# Patient Record
Sex: FEMALE | Race: WHITE | Employment: OTHER | URBAN - METROPOLITAN AREA
[De-identification: names, ages, dates, MRNs, and addresses within clinical notes are randomized per-mention and may not be internally consistent; named-entity substitution may affect disease eponyms.]

---

## 2018-09-22 ENCOUNTER — HOSPITAL ENCOUNTER (EMERGENCY)
Age: 78
Discharge: HOME | End: 2018-09-22
Attending: EMERGENCY MEDICINE
Payer: MEDICARE

## 2018-09-22 VITALS
DIASTOLIC BLOOD PRESSURE: 83 MMHG | SYSTOLIC BLOOD PRESSURE: 143 MMHG | TEMPERATURE: 97.7 F | HEART RATE: 79 BPM | RESPIRATION RATE: 18 BRPM | OXYGEN SATURATION: 95 %

## 2018-09-22 DIAGNOSIS — S01.81XA FACIAL LACERATION, INITIAL ENCOUNTER: Primary | ICD-10-CM

## 2018-09-22 PROCEDURE — 6360000002 HC RX W HCPCS: Performed by: PHYSICIAN ASSISTANT

## 2018-09-22 PROCEDURE — 90471 IMMUNIZATION ADMIN: CPT | Performed by: PHYSICIAN ASSISTANT

## 2018-09-22 PROCEDURE — 2500000003 HC RX 250 WO HCPCS: Performed by: PHYSICIAN ASSISTANT

## 2018-09-22 PROCEDURE — 99284 EMERGENCY DEPT VISIT MOD MDM: CPT

## 2018-09-22 PROCEDURE — 90715 TDAP VACCINE 7 YRS/> IM: CPT | Performed by: PHYSICIAN ASSISTANT

## 2018-09-22 PROCEDURE — 4500000024 HC ED LEVEL 4 PROCEDURE

## 2018-09-22 RX ORDER — LIDOCAINE HYDROCHLORIDE AND EPINEPHRINE 10; 10 MG/ML; UG/ML
20 INJECTION, SOLUTION INFILTRATION; PERINEURAL ONCE
Status: COMPLETED | OUTPATIENT
Start: 2018-09-22 | End: 2018-09-22

## 2018-09-22 RX ORDER — CHLORHEXIDINE GLUCONATE 0.12 MG/ML
15 RINSE ORAL 2 TIMES DAILY
Qty: 420 ML | Refills: 0 | Status: SHIPPED | OUTPATIENT
Start: 2018-09-22 | End: 2018-10-06

## 2018-09-22 RX ADMIN — LIDOCAINE HYDROCHLORIDE,EPINEPHRINE BITARTRATE 20 ML: 10; .01 INJECTION, SOLUTION INFILTRATION; PERINEURAL at 16:13

## 2018-09-22 RX ADMIN — TETANUS TOXOID, REDUCED DIPHTHERIA TOXOID AND ACELLULAR PERTUSSIS VACCINE, ADSORBED 0.5 ML: 5; 2.5; 8; 8; 2.5 SUSPENSION INTRAMUSCULAR at 16:14

## 2018-09-22 ASSESSMENT — ENCOUNTER SYMPTOMS
PHOTOPHOBIA: 0
RESPIRATORY NEGATIVE: 1
GASTROINTESTINAL NEGATIVE: 1

## 2018-09-22 NOTE — ED PROVIDER NOTES
[address] [facility]          PHYSICIAN ASSISTANT NOTE       Date of evaluation: 9/22/2018    Chief Complaint     Fall (tripped over steps at [facility])    History of Present Illness     Todd Bullock is a 76 y.o. female who [place]. States that she was walking up the steps when she slipped, striking her face against the step. Reports a laceration to her lip. Denies losing consciousness. Denies tooth or jaw pain. Reports some left arm pain from bracing herself. Denies any other injuries. Unknown last tetanus shot. Takes 81mg aspirin. Review of Systems     Review of Systems   Constitutional: Negative for chills, diaphoresis, fatigue and fever. Eyes: Negative for photophobia and visual disturbance. Respiratory: Negative. Cardiovascular: Negative. Gastrointestinal: Negative. Past Medical, Surgical, Family, and Social History     She has a past medical history of Arthritis; Hypertension; and Thyroid disease. She has no past surgical history on file. Her family history is not on file. She reports that she has never smoked. She does not have any smokeless tobacco history on file. She reports that she drinks alcohol. She reports that she does not use drugs. Medications     Previous Medications    ASPIRIN 81 MG CHEWABLE TABLET    Take 81 mg by mouth daily    LEVOTHYROXINE (SYNTHROID) 75 MCG TABLET    Take 75 mcg by mouth Daily    LISINOPRIL (PRINIVIL;ZESTRIL) 40 MG TABLET    Take 40 mg by mouth daily    METOPROLOL TARTRATE (LOPRESSOR) 25 MG TABLET    Take 25 mg by mouth 2 times daily       Allergies     She is allergic to penicillins. Physical Exam     INITIAL VITALS: BP: (!) 165/91, Temp: 97.7 °F (36.5 °C), Pulse: 79, Resp: 18, SpO2: 100 %   Physical Exam   Constitutional: She appears well-developed and well-nourished. No distress.    HENT:   Head: Normocephalic.   2cm through and through laceration to the upper lip MAKING / ASSESSMENT / Santiracheal Inman is a 76 y.o. female who presented with mechanical fall and 2cm laceration to upper lip (philtrum). There was no loss of consciousness. Patient's teeth and jaw were palpated without pain or loose teeth. No imaging studies were obtained. 2cm through and through laceration to the upper lip. Laceration was repaired with sutures, please see procedure note above. Inner mucosa was not repaired. Patient tolerated laceration repair well. Patient was initiated on peridex mouth rinse for inner mucosal laceration. She was instructed to follow-up with her PCP in 5-7 days for suture removal. Patient given return precautions. This patient was also evaluated by the attending physician. All care plans were discussed and agreed upon. Clinical Impression     1.  Facial laceration, initial encounter        Disposition     PATIENT REFERRED TO:  Enrico Healy    Schedule an appointment as soon as possible for a visit   5-7 days      DISCHARGE MEDICATIONS:  New Prescriptions    CHLORHEXIDINE ([facility]) 0.12 % SOLUTION    Take 15 mLs by mouth 2 times daily for 14 days       DISPOSITION Decision To Discharge 09/22/2018 05:17:09 PM       Zhane Mirna, [address]  09/22/18 7027

## 2018-09-22 NOTE — ED PROVIDER NOTES
ED Attending Attestation Note     Date of evaluation: 9/22/2018    This patient was seen by the advance practice provider. I have seen and examined the patient, agree with the workup, evaluation, management and diagnosis. The care plan has been discussed. My assessment reveals Here for a facial laceration. Patient states she actually [place]. She had no loss consciousness no injuries to her teeth area  On exam patient does have a through and through laceration just above her upper lip in the midline measures 2 cm . Please refer to the DEREK's  note in regards to the procedure. I was present during key portions of this procedure.      Pop Gonzales MD  09/22/18 2929

## 2022-04-27 ENCOUNTER — APPOINTMENT (OUTPATIENT)
Dept: GENERAL RADIOLOGY | Age: 82
DRG: 464 | End: 2022-04-27
Payer: MEDICARE

## 2022-04-27 ENCOUNTER — ANESTHESIA (OUTPATIENT)
Dept: OPERATING ROOM | Age: 82
DRG: 464 | End: 2022-04-27
Payer: MEDICARE

## 2022-04-27 ENCOUNTER — APPOINTMENT (OUTPATIENT)
Dept: CT IMAGING | Age: 82
DRG: 464 | End: 2022-04-27
Payer: MEDICARE

## 2022-04-27 ENCOUNTER — ANESTHESIA EVENT (OUTPATIENT)
Dept: OPERATING ROOM | Age: 82
DRG: 464 | End: 2022-04-27
Payer: MEDICARE

## 2022-04-27 ENCOUNTER — HOSPITAL ENCOUNTER (INPATIENT)
Age: 82
LOS: 6 days | Discharge: HOME HEALTH | DRG: 464 | End: 2022-05-03
Attending: EMERGENCY MEDICINE | Admitting: INTERNAL MEDICINE
Payer: MEDICARE

## 2022-04-27 VITALS — TEMPERATURE: 96.8 F | SYSTOLIC BLOOD PRESSURE: 181 MMHG | OXYGEN SATURATION: 100 % | DIASTOLIC BLOOD PRESSURE: 91 MMHG

## 2022-04-27 DIAGNOSIS — S42.292G HUMERUS HEAD FRACTURE, LEFT, WITH DELAYED HEALING, SUBSEQUENT ENCOUNTER: ICD-10-CM

## 2022-04-27 DIAGNOSIS — S43.015A ANTERIOR SHOULDER DISLOCATION, LEFT, INITIAL ENCOUNTER: Primary | ICD-10-CM

## 2022-04-27 DIAGNOSIS — S42.412A LEFT SUPRACONDYLAR HUMERUS FRACTURE, CLOSED, INITIAL ENCOUNTER: ICD-10-CM

## 2022-04-27 DIAGNOSIS — S51.012A ELBOW LACERATION, LEFT, INITIAL ENCOUNTER: ICD-10-CM

## 2022-04-27 DIAGNOSIS — S42.292A HUMERAL HEAD FRACTURE, LEFT, CLOSED, INITIAL ENCOUNTER: ICD-10-CM
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
## 2022-04-27 DIAGNOSIS — D72.829 LEUKOCYTOSIS, UNSPECIFIED TYPE: ICD-10-CM

## 2022-04-27 LAB
A/G RATIO: 1.7 (ref 1.1–2.2)
ALBUMIN SERPL-MCNC: 4.4 G/DL (ref 3.4–5)
ALP BLD-CCNC: 32 U/L (ref 40–129)
ALT SERPL-CCNC: 36 U/L (ref 10–40)
ANION GAP SERPL CALCULATED.3IONS-SCNC: 12 MMOL/L (ref 3–16)
AST SERPL-CCNC: 43 U/L (ref 15–37)
BACTERIA: ABNORMAL /HPF
BASOPHILS ABSOLUTE: 0.1 K/UL (ref 0–0.2)
BASOPHILS RELATIVE PERCENT: 0.4 %
BILIRUB SERPL-MCNC: 0.5 MG/DL (ref 0–1)
BILIRUBIN URINE: NEGATIVE
BLOOD, URINE: ABNORMAL
BUN BLDV-MCNC: 18 MG/DL (ref 7–20)
CALCIUM SERPL-MCNC: 9.3 MG/DL (ref 8.3–10.6)
CHLORIDE BLD-SCNC: 100 MMOL/L (ref 99–110)
CLARITY: ABNORMAL
CO2: 26 MMOL/L (ref 21–32)
COLOR: YELLOW
CREAT SERPL-MCNC: 0.9 MG/DL (ref 0.6–1.2)
EOSINOPHILS ABSOLUTE: 0.1 K/UL (ref 0–0.6)
EOSINOPHILS RELATIVE PERCENT: 0.4 %
EPITHELIAL CELLS, UA: ABNORMAL /HPF (ref 0–5)
GFR AFRICAN AMERICAN: >60
GFR NON-AFRICAN AMERICAN: 60
GLUCOSE BLD-MCNC: 114 MG/DL (ref 70–99)
GLUCOSE URINE: NEGATIVE MG/DL
HCT VFR BLD CALC: 34.6 % (ref 36–48)
HEMOGLOBIN: 11.7 G/DL (ref 12–16)
KETONES, URINE: ABNORMAL MG/DL
LEUKOCYTE ESTERASE, URINE: ABNORMAL
LYMPHOCYTES ABSOLUTE: 0.7 K/UL (ref 1–5.1)
LYMPHOCYTES RELATIVE PERCENT: 3.9 %
MCH RBC QN AUTO: 30.8 PG (ref 26–34)
MCHC RBC AUTO-ENTMCNC: 33.9 G/DL (ref 31–36)
MCV RBC AUTO: 90.9 FL (ref 80–100)
MICROSCOPIC EXAMINATION: YES
MONOCYTES ABSOLUTE: 1.1 K/UL (ref 0–1.3)
MONOCYTES RELATIVE PERCENT: 6 %
NEUTROPHILS ABSOLUTE: 17 K/UL (ref 1.7–7.7)
NEUTROPHILS RELATIVE PERCENT: 89.3 %
NITRITE, URINE: NEGATIVE
PDW BLD-RTO: 13 % (ref 12.4–15.4)
PH UA: 7.5 (ref 5–8)
PLATELET # BLD: 178 K/UL (ref 135–450)
PMV BLD AUTO: 9.4 FL (ref 5–10.5)
POTASSIUM SERPL-SCNC: 4.1 MMOL/L (ref 3.5–5.1)
PROTEIN UA: NEGATIVE MG/DL
RBC # BLD: 3.8 M/UL (ref 4–5.2)
RBC UA: ABNORMAL /HPF (ref 0–4)
SODIUM BLD-SCNC: 138 MMOL/L (ref 136–145)
SPECIFIC GRAVITY UA: 1.02 (ref 1–1.03)
TOTAL PROTEIN: 7 G/DL (ref 6.4–8.2)
URINE REFLEX TO CULTURE: YES
URINE TYPE: ABNORMAL
UROBILINOGEN, URINE: 0.2 E.U./DL
WBC # BLD: 19 K/UL (ref 4–11)
WBC UA: ABNORMAL /HPF (ref 0–5)

## 2022-04-27 PROCEDURE — 23655 CLTX SHO DSLC W/MNPJ W/ANES: CPT | Performed by: ORTHOPAEDIC SURGERY

## 2022-04-27 PROCEDURE — 3700000001 HC ADD 15 MINUTES (ANESTHESIA): Performed by: ORTHOPAEDIC SURGERY

## 2022-04-27 PROCEDURE — 73060 X-RAY EXAM OF HUMERUS: CPT

## 2022-04-27 PROCEDURE — 3700000000 HC ANESTHESIA ATTENDED CARE: Performed by: ORTHOPAEDIC SURGERY

## 2022-04-27 PROCEDURE — 85025 COMPLETE CBC W/AUTO DIFF WBC: CPT

## 2022-04-27 PROCEDURE — 6360000002 HC RX W HCPCS: Performed by: ORTHOPAEDIC SURGERY

## 2022-04-27 PROCEDURE — 93005 ELECTROCARDIOGRAM TRACING: CPT | Performed by: EMERGENCY MEDICINE

## 2022-04-27 PROCEDURE — 81001 URINALYSIS AUTO W/SCOPE: CPT

## 2022-04-27 PROCEDURE — 6360000002 HC RX W HCPCS: Performed by: ANESTHESIOLOGY

## 2022-04-27 PROCEDURE — 3600000013 HC SURGERY LEVEL 3 ADDTL 15MIN: Performed by: ORTHOPAEDIC SURGERY

## 2022-04-27 PROCEDURE — 2500000003 HC RX 250 WO HCPCS: Performed by: ANESTHESIOLOGY

## 2022-04-27 PROCEDURE — 73070 X-RAY EXAM OF ELBOW: CPT

## 2022-04-27 PROCEDURE — 71045 X-RAY EXAM CHEST 1 VIEW: CPT

## 2022-04-27 PROCEDURE — 99222 1ST HOSP IP/OBS MODERATE 55: CPT | Performed by: ORTHOPAEDIC SURGERY

## 2022-04-27 PROCEDURE — 73110 X-RAY EXAM OF WRIST: CPT

## 2022-04-27 PROCEDURE — 7100000000 HC PACU RECOVERY - FIRST 15 MIN: Performed by: ORTHOPAEDIC SURGERY

## 2022-04-27 PROCEDURE — 0JBF0ZZ EXCISION OF LEFT UPPER ARM SUBCUTANEOUS TISSUE AND FASCIA, OPEN APPROACH: ICD-10-PCS | Performed by: ORTHOPAEDIC SURGERY

## 2022-04-27 PROCEDURE — 96365 THER/PROPH/DIAG IV INF INIT: CPT

## 2022-04-27 PROCEDURE — 80053 COMPREHEN METABOLIC PANEL: CPT

## 2022-04-27 PROCEDURE — 73020 X-RAY EXAM OF SHOULDER: CPT

## 2022-04-27 PROCEDURE — 99285 EMERGENCY DEPT VISIT HI MDM: CPT

## 2022-04-27 PROCEDURE — 73200 CT UPPER EXTREMITY W/O DYE: CPT

## 2022-04-27 PROCEDURE — 36415 COLL VENOUS BLD VENIPUNCTURE: CPT

## 2022-04-27 PROCEDURE — 2580000003 HC RX 258: Performed by: ANESTHESIOLOGY

## 2022-04-27 PROCEDURE — 2709999900 HC NON-CHARGEABLE SUPPLY: Performed by: ORTHOPAEDIC SURGERY

## 2022-04-27 PROCEDURE — 7100000001 HC PACU RECOVERY - ADDTL 15 MIN: Performed by: ORTHOPAEDIC SURGERY

## 2022-04-27 PROCEDURE — 6360000002 HC RX W HCPCS: Performed by: EMERGENCY MEDICINE

## 2022-04-27 PROCEDURE — 73030 X-RAY EXAM OF SHOULDER: CPT

## 2022-04-27 PROCEDURE — 96375 TX/PRO/DX INJ NEW DRUG ADDON: CPT

## 2022-04-27 PROCEDURE — 2580000003 HC RX 258: Performed by: EMERGENCY MEDICINE

## 2022-04-27 PROCEDURE — 3600000003 HC SURGERY LEVEL 3 BASE: Performed by: ORTHOPAEDIC SURGERY

## 2022-04-27 PROCEDURE — 73502 X-RAY EXAM HIP UNI 2-3 VIEWS: CPT

## 2022-04-27 PROCEDURE — 1200000000 HC SEMI PRIVATE

## 2022-04-27 PROCEDURE — 3209999900 FLUORO FOR SURGICAL PROCEDURES

## 2022-04-27 PROCEDURE — 96376 TX/PRO/DX INJ SAME DRUG ADON: CPT

## 2022-04-27 PROCEDURE — 20103 EXPL PENTRG WOUND EXTREMITY: CPT | Performed by: ORTHOPAEDIC SURGERY

## 2022-04-27 RX ORDER — ONDANSETRON 2 MG/ML
4 INJECTION INTRAMUSCULAR; INTRAVENOUS EVERY 6 HOURS PRN
Status: DISCONTINUED | OUTPATIENT
Start: 2022-04-27 | End: 2022-05-03 | Stop reason: HOSPADM

## 2022-04-27 RX ORDER — SUCCINYLCHOLINE/SOD CL,ISO/PF 100 MG/5ML
SYRINGE (ML) INTRAVENOUS PRN
Status: DISCONTINUED | OUTPATIENT
Start: 2022-04-27 | End: 2022-04-27 | Stop reason: SDUPTHER

## 2022-04-27 RX ORDER — LABETALOL HYDROCHLORIDE 5 MG/ML
10 INJECTION, SOLUTION INTRAVENOUS
Status: DISCONTINUED | OUTPATIENT
Start: 2022-04-27 | End: 2022-04-28 | Stop reason: HOSPADM

## 2022-04-27 RX ORDER — DEXAMETHASONE SODIUM PHOSPHATE 4 MG/ML
INJECTION, SOLUTION INTRA-ARTICULAR; INTRALESIONAL; INTRAMUSCULAR; INTRAVENOUS; SOFT TISSUE PRN
Status: DISCONTINUED | OUTPATIENT
Start: 2022-04-27 | End: 2022-04-27 | Stop reason: SDUPTHER

## 2022-04-27 RX ORDER — SODIUM CHLORIDE 9 MG/ML
INJECTION, SOLUTION INTRAVENOUS PRN
Status: DISCONTINUED | OUTPATIENT
Start: 2022-04-27 | End: 2022-04-28 | Stop reason: HOSPADM

## 2022-04-27 RX ORDER — ACETAMINOPHEN 650 MG/1
650 SUPPOSITORY RECTAL EVERY 6 HOURS PRN
Status: DISCONTINUED | OUTPATIENT
Start: 2022-04-27 | End: 2022-05-03 | Stop reason: HOSPADM

## 2022-04-27 RX ORDER — FENTANYL CITRATE 50 UG/ML
25 INJECTION, SOLUTION INTRAMUSCULAR; INTRAVENOUS EVERY 5 MIN PRN
Status: DISCONTINUED | OUTPATIENT
Start: 2022-04-27 | End: 2022-04-28 | Stop reason: HOSPADM

## 2022-04-27 RX ORDER — SODIUM CHLORIDE, SODIUM LACTATE, POTASSIUM CHLORIDE, CALCIUM CHLORIDE 600; 310; 30; 20 MG/100ML; MG/100ML; MG/100ML; MG/100ML
INJECTION, SOLUTION INTRAVENOUS CONTINUOUS PRN
Status: DISCONTINUED | OUTPATIENT
Start: 2022-04-27 | End: 2022-04-27 | Stop reason: SDUPTHER

## 2022-04-27 RX ORDER — LIDOCAINE HYDROCHLORIDE AND EPINEPHRINE 10; 10 MG/ML; UG/ML
20 INJECTION, SOLUTION INFILTRATION; PERINEURAL ONCE
Status: DISCONTINUED | OUTPATIENT
Start: 2022-04-27 | End: 2022-05-03 | Stop reason: HOSPADM

## 2022-04-27 RX ORDER — MORPHINE SULFATE 2 MG/ML
2 INJECTION, SOLUTION INTRAMUSCULAR; INTRAVENOUS
Status: DISCONTINUED | OUTPATIENT
Start: 2022-04-27 | End: 2022-05-03 | Stop reason: HOSPADM

## 2022-04-27 RX ORDER — OXYCODONE HYDROCHLORIDE 5 MG/1
5 TABLET ORAL
Status: ACTIVE | OUTPATIENT
Start: 2022-04-27 | End: 2022-04-27

## 2022-04-27 RX ORDER — ROSUVASTATIN CALCIUM 40 MG/1
TABLET, COATED ORAL
COMMUNITY
Start: 2022-04-11

## 2022-04-27 RX ORDER — ONDANSETRON 2 MG/ML
INJECTION INTRAMUSCULAR; INTRAVENOUS PRN
Status: DISCONTINUED | OUTPATIENT
Start: 2022-04-27 | End: 2022-04-27 | Stop reason: SDUPTHER

## 2022-04-27 RX ORDER — LISINOPRIL 40 MG/1
40 TABLET ORAL DAILY
Status: DISCONTINUED | OUTPATIENT
Start: 2022-04-28 | End: 2022-05-03 | Stop reason: HOSPADM

## 2022-04-27 RX ORDER — CLINDAMYCIN PHOSPHATE 900 MG/50ML
INJECTION INTRAVENOUS PRN
Status: DISCONTINUED | OUTPATIENT
Start: 2022-04-27 | End: 2022-04-27 | Stop reason: SDUPTHER

## 2022-04-27 RX ORDER — ONDANSETRON 2 MG/ML
4 INJECTION INTRAMUSCULAR; INTRAVENOUS
Status: ACTIVE | OUTPATIENT
Start: 2022-04-27 | End: 2022-04-27

## 2022-04-27 RX ORDER — LIDOCAINE HYDROCHLORIDE 20 MG/ML
INJECTION, SOLUTION INTRAVENOUS PRN
Status: DISCONTINUED | OUTPATIENT
Start: 2022-04-27 | End: 2022-04-27 | Stop reason: SDUPTHER

## 2022-04-27 RX ORDER — SODIUM CHLORIDE 0.9 % (FLUSH) 0.9 %
5-40 SYRINGE (ML) INJECTION PRN
Status: DISCONTINUED | OUTPATIENT
Start: 2022-04-27 | End: 2022-04-28 | Stop reason: HOSPADM

## 2022-04-27 RX ORDER — FENTANYL CITRATE 50 UG/ML
50 INJECTION, SOLUTION INTRAMUSCULAR; INTRAVENOUS ONCE
Status: COMPLETED | OUTPATIENT
Start: 2022-04-27 | End: 2022-04-27

## 2022-04-27 RX ORDER — LIDOCAINE HYDROCHLORIDE 20 MG/ML
INJECTION, SOLUTION EPIDURAL; INFILTRATION; INTRACAUDAL; PERINEURAL PRN
Status: DISCONTINUED | OUTPATIENT
Start: 2022-04-27 | End: 2022-04-27

## 2022-04-27 RX ORDER — FENTANYL CITRATE 50 UG/ML
INJECTION, SOLUTION INTRAMUSCULAR; INTRAVENOUS PRN
Status: DISCONTINUED | OUTPATIENT
Start: 2022-04-27 | End: 2022-04-27 | Stop reason: SDUPTHER

## 2022-04-27 RX ORDER — METOPROLOL SUCCINATE 100 MG/1
TABLET, EXTENDED RELEASE ORAL
COMMUNITY
Start: 2022-03-15

## 2022-04-27 RX ORDER — ONDANSETRON 4 MG/1
4 TABLET, ORALLY DISINTEGRATING ORAL EVERY 8 HOURS PRN
Status: DISCONTINUED | OUTPATIENT
Start: 2022-04-27 | End: 2022-05-03 | Stop reason: HOSPADM

## 2022-04-27 RX ORDER — MORPHINE SULFATE 2 MG/ML
2 INJECTION, SOLUTION INTRAMUSCULAR; INTRAVENOUS ONCE
Status: COMPLETED | OUTPATIENT
Start: 2022-04-27 | End: 2022-04-27

## 2022-04-27 RX ORDER — SODIUM CHLORIDE 0.9 % (FLUSH) 0.9 %
5-40 SYRINGE (ML) INJECTION EVERY 12 HOURS SCHEDULED
Status: DISCONTINUED | OUTPATIENT
Start: 2022-04-27 | End: 2022-05-03 | Stop reason: HOSPADM

## 2022-04-27 RX ORDER — POLYETHYLENE GLYCOL 3350 17 G/17G
17 POWDER, FOR SOLUTION ORAL DAILY PRN
Status: DISCONTINUED | OUTPATIENT
Start: 2022-04-27 | End: 2022-05-03 | Stop reason: HOSPADM

## 2022-04-27 RX ORDER — ACETAMINOPHEN 325 MG/1
650 TABLET ORAL EVERY 6 HOURS PRN
Status: DISCONTINUED | OUTPATIENT
Start: 2022-04-27 | End: 2022-05-03 | Stop reason: HOSPADM

## 2022-04-27 RX ORDER — PROPOFOL 10 MG/ML
INJECTION, EMULSION INTRAVENOUS PRN
Status: DISCONTINUED | OUTPATIENT
Start: 2022-04-27 | End: 2022-04-27 | Stop reason: SDUPTHER

## 2022-04-27 RX ORDER — SODIUM CHLORIDE 0.9 % (FLUSH) 0.9 %
5-40 SYRINGE (ML) INJECTION PRN
Status: DISCONTINUED | OUTPATIENT
Start: 2022-04-27 | End: 2022-05-03 | Stop reason: HOSPADM

## 2022-04-27 RX ORDER — LEVOTHYROXINE SODIUM 88 UG/1
88 TABLET ORAL DAILY
Status: DISCONTINUED | OUTPATIENT
Start: 2022-04-28 | End: 2022-05-03 | Stop reason: HOSPADM

## 2022-04-27 RX ORDER — SODIUM CHLORIDE 9 MG/ML
INJECTION, SOLUTION INTRAVENOUS PRN
Status: DISCONTINUED | OUTPATIENT
Start: 2022-04-27 | End: 2022-05-03 | Stop reason: HOSPADM

## 2022-04-27 RX ORDER — METOPROLOL SUCCINATE 50 MG/1
50 TABLET, EXTENDED RELEASE ORAL DAILY
Status: DISCONTINUED | OUTPATIENT
Start: 2022-04-28 | End: 2022-04-30

## 2022-04-27 RX ORDER — PROPOFOL 10 MG/ML
20 INJECTION, EMULSION INTRAVENOUS ONCE
Status: COMPLETED | OUTPATIENT
Start: 2022-04-27 | End: 2022-04-27

## 2022-04-27 RX ORDER — SODIUM CHLORIDE 9 MG/ML
1000 INJECTION, SOLUTION INTRAVENOUS CONTINUOUS
Status: DISCONTINUED | OUTPATIENT
Start: 2022-04-27 | End: 2022-04-30

## 2022-04-27 RX ORDER — AMLODIPINE BESYLATE 5 MG/1
TABLET ORAL
COMMUNITY
Start: 2022-02-23

## 2022-04-27 RX ORDER — SODIUM CHLORIDE 0.9 % (FLUSH) 0.9 %
5-40 SYRINGE (ML) INJECTION EVERY 12 HOURS SCHEDULED
Status: DISCONTINUED | OUTPATIENT
Start: 2022-04-27 | End: 2022-04-28 | Stop reason: HOSPADM

## 2022-04-27 RX ORDER — TRAMADOL HYDROCHLORIDE 50 MG/1
50 TABLET ORAL EVERY 6 HOURS PRN
Status: DISCONTINUED | OUTPATIENT
Start: 2022-04-27 | End: 2022-05-01

## 2022-04-27 RX ADMIN — PROPOFOL 100 MG: 10 INJECTION, EMULSION INTRAVENOUS at 22:38

## 2022-04-27 RX ADMIN — Medication 140 MG: at 22:38

## 2022-04-27 RX ADMIN — MORPHINE SULFATE 2 MG: 2 INJECTION, SOLUTION INTRAMUSCULAR; INTRAVENOUS at 20:40

## 2022-04-27 RX ADMIN — ONDANSETRON 4 MG: 2 INJECTION INTRAMUSCULAR; INTRAVENOUS at 23:23

## 2022-04-27 RX ADMIN — DEXAMETHASONE SODIUM PHOSPHATE 8 MG: 4 INJECTION, SOLUTION INTRAMUSCULAR; INTRAVENOUS at 23:00

## 2022-04-27 RX ADMIN — SODIUM CHLORIDE, SODIUM LACTATE, POTASSIUM CHLORIDE, AND CALCIUM CHLORIDE: .6; .31; .03; .02 INJECTION, SOLUTION INTRAVENOUS at 22:30

## 2022-04-27 RX ADMIN — SODIUM CHLORIDE 1000 ML: 9 INJECTION, SOLUTION INTRAVENOUS at 18:39

## 2022-04-27 RX ADMIN — CEFAZOLIN SODIUM 1000 MG: 1 INJECTION, POWDER, FOR SOLUTION INTRAMUSCULAR; INTRAVENOUS at 20:37

## 2022-04-27 RX ADMIN — CLINDAMYCIN PHOSPHATE 900 MG: 18 INJECTION, SOLUTION INTRAVENOUS at 22:44

## 2022-04-27 RX ADMIN — SODIUM CHLORIDE 1000 ML: 9 INJECTION, SOLUTION INTRAVENOUS at 18:53

## 2022-04-27 RX ADMIN — FENTANYL CITRATE 100 MCG: 50 INJECTION, SOLUTION INTRAMUSCULAR; INTRAVENOUS at 22:38

## 2022-04-27 RX ADMIN — MORPHINE SULFATE 2 MG: 2 INJECTION, SOLUTION INTRAMUSCULAR; INTRAVENOUS at 17:13

## 2022-04-27 RX ADMIN — FENTANYL CITRATE 50 MCG: 50 INJECTION, SOLUTION INTRAMUSCULAR; INTRAVENOUS at 18:03

## 2022-04-27 RX ADMIN — PROPOFOL 20 MG: 10 INJECTION, EMULSION INTRAVENOUS at 18:35

## 2022-04-27 RX ADMIN — LIDOCAINE HYDROCHLORIDE 100 MG: 20 INJECTION, SOLUTION INTRAVENOUS at 22:38

## 2022-04-27 ASSESSMENT — PULMONARY FUNCTION TESTS
PIF_VALUE: 15
PIF_VALUE: 15
PIF_VALUE: 17
PIF_VALUE: 1
PIF_VALUE: 0
PIF_VALUE: 26
PIF_VALUE: 16
PIF_VALUE: 9
PIF_VALUE: 24
PIF_VALUE: 1
PIF_VALUE: 17
PIF_VALUE: 0
PIF_VALUE: 16
PIF_VALUE: 16
PIF_VALUE: 15
PIF_VALUE: 15
PIF_VALUE: 1
PIF_VALUE: 16
PIF_VALUE: 16
PIF_VALUE: 0
PIF_VALUE: 15
PIF_VALUE: 15
PIF_VALUE: 1
PIF_VALUE: 13
PIF_VALUE: 1
PIF_VALUE: 13
PIF_VALUE: 2
PIF_VALUE: 13
PIF_VALUE: 13
PIF_VALUE: 18
PIF_VALUE: 13
PIF_VALUE: 14
PIF_VALUE: 13
PIF_VALUE: 14
PIF_VALUE: 0
PIF_VALUE: 17
PIF_VALUE: 15
PIF_VALUE: 15
PIF_VALUE: 4
PIF_VALUE: 0
PIF_VALUE: 1
PIF_VALUE: 16
PIF_VALUE: 15
PIF_VALUE: 13
PIF_VALUE: 2
PIF_VALUE: 13
PIF_VALUE: 17
PIF_VALUE: 20
PIF_VALUE: 16
PIF_VALUE: 0
PIF_VALUE: 13
PIF_VALUE: 15
PIF_VALUE: 0
PIF_VALUE: 16
PIF_VALUE: 4
PIF_VALUE: 13
PIF_VALUE: 15

## 2022-04-27 ASSESSMENT — PAIN DESCRIPTION - DESCRIPTORS: DESCRIPTORS: ACHING

## 2022-04-27 ASSESSMENT — PAIN SCALES - GENERAL
PAINLEVEL_OUTOF10: 4
PAINLEVEL_OUTOF10: 0

## 2022-04-27 ASSESSMENT — PAIN - FUNCTIONAL ASSESSMENT: PAIN_FUNCTIONAL_ASSESSMENT: 0-10

## 2022-04-27 ASSESSMENT — PAIN DESCRIPTION - PAIN TYPE: TYPE: ACUTE PAIN

## 2022-04-27 ASSESSMENT — PAIN DESCRIPTION - ORIENTATION: ORIENTATION: LEFT

## 2022-04-27 ASSESSMENT — PAIN DESCRIPTION - FREQUENCY: FREQUENCY: CONTINUOUS

## 2022-04-27 ASSESSMENT — PAIN DESCRIPTION - LOCATION: LOCATION: ARM;ELBOW

## 2022-04-27 NOTE — ED NOTES
Dr Heriberto Mcqueen talking with family by telephone, family is Out-of-town, Dr Heriberto Mcqueen obtaining verbal consent for conscious sedation, giving patient update.      Renee Acevedo RN  04/27/22 2151

## 2022-04-27 NOTE — ED TRIAGE NOTES
Patient to ed per [facility] with complaints of a left arm/elbow injury after a trip and fall, patient also has left elbow laceration patient denies other injuries.

## 2022-04-27 NOTE — ED PROVIDER NOTES
[facility]  EMERGENCY DEPT VISIT      Patient Identification  Edinson Mi is a 78 y.o. female. Chief Complaint   Arm Injury (left) and Elbow Injury (left)      History of Present Illness: This is a  78 y.o. female who presents via ambulance to the ED with complaints of fall. Patient was at the [employer] and turned to walk away and lost her balance and fell. She landed onto her left shoulder. She did not hit her head nor did she lose consciousness. She was not dizzy prior to the fall or afterwards. She denies any neck or back pain. No rib pain or shortness of breath. She complains of left shoulder pain and left elbow soreness. No numbness or weakness to the hand. Movement of the elbow causes the shoulder to hurt. She was able to get up with assistance and able to walk a few feet before sitting down and denies any hip, knee, or ankle pain. She lives alone and does not use a walker. Otherwise states that she has been feeling well. There is been no recent fever. No sinus congestion, sore throat, cough, vomiting, diarrhea, dysuria. Past Medical History:   Diagnosis Date    Arthritis     Hypertension     Thyroid disease        No past surgical history on file.       Current Facility-Administered Medications:     lidocaine-EPINEPHrine 1 %-1:350089 injection 20 mL, 20 mL, IntraDERmal, Once, Tito Fung MD    0.9 % sodium chloride infusion, 1,000 mL, IntraVENous, Continuous, Tito Fung MD, Last Rate: 100 mL/hr at 04/27/22 1853, 1,000 mL at 04/27/22 1853    ceFAZolin (ANCEF) 1,000 mg in dextrose 5 % 50 mL IVPB (mini-bag), 1,000 mg, IntraVENous, Once, Tito Fung MD, Last Rate: 100 mL/hr at 04/27/22 2037, 1,000 mg at 04/27/22 2037    Current Outpatient Medications:     metoprolol succinate (TOPROL XL) 100 MG extended release tablet, , Disp: , Rfl:     amLODIPine (NORVASC) 5 MG tablet, , Disp: , Rfl:     rosuvastatin (CRESTOR) 40 MG tablet, , Disp: , Rfl:     aspirin 81 MG chewable tablet, Take 81 mg by mouth daily (Patient not taking: Reported on 4/27/2022), Disp: , Rfl:     lisinopril (PRINIVIL;ZESTRIL) 40 MG tablet, Take 40 mg by mouth daily, Disp: , Rfl:     levothyroxine (SYNTHROID) 75 MCG tablet, Take 88 mcg by mouth Daily , Disp: , Rfl:     Allergies   Allergen Reactions    Latex Other (See Comments)    Penicillins        Social History     Socioeconomic History    Marital status: Single     Spouse name: Not on file    Number of children: Not on file    Years of education: Not on file    Highest education level: Not on file   Occupational History    Not on file   Tobacco Use    Smoking status: Never Smoker    Smokeless tobacco: Not on file   Substance and Sexual Activity    Alcohol use: Yes     Comment: social    Drug use: No    Sexual activity: Not on file   Other Topics Concern    Not on file   Social History Narrative    Not on file     Social Determinants of Health     Financial Resource Strain:     Difficulty of Paying Living Expenses: Not on file   Food Insecurity:     Worried About Running Out of Food in the Last Year: Not on file    [place] of Food in the Last Year: Not on file   Transportation Needs:     Lack of Transportation (Medical): Not on file    Lack of Transportation (Non-Medical):  Not on file   Physical Activity:     Days of Exercise per Week: Not on file    Minutes of Exercise per Session: Not on file   Stress:     Feeling of Stress : Not on file   Social Connections:     Frequency of Communication with Friends and Family: Not on file    Frequency of Social Gatherings with Friends and Family: Not on file    Attends Mandaeism Services: Not on file    Active Member of Clubs or Organizations: Not on file    Attends Club or Organization Meetings: Not on file    Marital Status: Not on file   Intimate Partner Violence:     Fear of Current or Ex-Partner: Not on file    Emotionally Abused: Not on file    Physically Abused: Not on file  Sexually Abused: Not on file   Housing Stability:     Unable to Pay for Housing in the Last Year: Not on file    Number of Places Lived in the Last Year: Not on file    Unstable Housing in the Last Year: Not on file       Nursing Notes Reviewed      ROS:  GENERAL:  No fever, no chills, no diaphoresis, no appetite changes  EYES: no eye discharge, no eye redness, no visual changes  ENT: no nasal congestion, no sore throat  CARDIAC: no chest pain,  no leg swelling  PULM: no cough, no shortness of breath  ABD: no abdominal pain, no nausea, no vomiting, no diarrhea,  : no dysuria, no hematuria, no urgency, no frequency. No flank pain  MUSCULOSKELETAL: no back pain, + arthralgias, no myalgias  NEURO: no headache, no lightheadedness, no dizziness, no numbness, no weakness, no syncope, no confusion, no speech difficulty  SKIN: no rashes, no erythema, + wounds, + ecchymosis      PHYSICAL EXAM:  GENERAL APPEARANCE: Lee Locke is in no acute respiratory distress. Awake and alert. VITAL SIGNS:   ED Triage Vitals [04/27/22 1524]   Enc Vitals Group      BP (!) 164/70      Pulse 73      Resp 14      Temp 97.8 °F (36.6 °C)      Temp src       SpO2 98 %      Weight 108 lb (49 kg)      Height 5' 3\" (1.6 m)      Head Circumference       Peak Flow       Pain Score       Pain Loc       Pain Edu? Excl. in [address]? HEAD: Normocephalic, atraumatic. No scalp hematoma  EYES:  Extraocular muscles are intact. Pupils equal round and reactive to light. Conjunctivas are pink. Negative scleral icterus. ENT:  Mucous membranes are moist.  Pharynx without erythema or exudates. NECK: Nontender and supple. No cervical adenopathy. No cervical spine tenderness  CHEST:  Clear to auscultation bilaterally. No rales, rhonchi, or wheezing. Chest wall is nontender  HEART:  Regular rate and regular rhythm. No murmurs. Strong and equal pulses in the upper and lower extremities.    ABDOMEN: Soft,  nondistended, positive bowel sounds. abdomen is nontender. No rebound. no guarding. MUSCULOSKELETAL: The calves are nontender to palpation. Mild tenderness to left lateral hip but good ROM without pain. Bilateral knees and ankles nontender. Left shoulder with marked swelling and bruising and anterior fullness. Pain with any ROM. Left elbow with marked swelling and ecchymosis. Gaping stellate deep wound to posterior elbow measuring approximately 6 cm into muscle. Left elbow is tender with pain with full extension although uncertain if this is related to increased shoulder pain with movement. Left wrist no swelling. Nontender. Good ROM. Strong radial pulse. No leg edema. NEUROLOGICAL: Awake, alert and oriented x 3. Power intact in the upper and lower extremities. Sensation is intact to light touch in the upper and lower extremities. Cranial Nerves 2-12 are intact. No truncal ataxia. No dysarthria or aphasia. DERMATOLOGIC: No petechiae, rashes No erythema. PSYCH: normal mood and affect. Normal thought content. ED COURSE AND MEDICAL DECISION MAKING:    EKG as interpreted by myself:  normal sinus rhythm with a rate of 76  Axis is   Normal  QTc is  normal   Anterior Q waves, low voltage  Intervals and Durations are unremarkable. No specific ST-T wave changes appreciated. No evidence of acute ischemia. Radiology:  Films have been read by radiologist as noted in chart unless otherwise stated. Other radiologic studies (i.e. CT, MRI, ultrasounds, etc ) have been interpreted by radiologist.     XR HIP 2-3 VW W PELVIS LEFT   Final Result      1. No acute fracture identified in left hip.   2.  Old left pubic bone fracture. XR CHEST 1 VIEW   Final Result      1. No acute cardiopulmonary findings. 2.  Multiple chronic right rib fractures. 3.  Multiple left rib fractures difficult to age but also may be chronic. 4.  Increased displacement across the left proximal humerus fracture.  Persistent anterior dislocation of the left shoulder. 5.  Age-indeterminate T9 and T12 vertebral compression fractures. Correlate with point tenderness. XR SHOULDER LEFT (MIN 2 VIEWS)   Final Result      1. Anterior dislocation of the left shoulder. 2. Comminuted oblique/transverse fracture of the left humeral head/neck. XR WRIST LEFT (MIN 3 VIEWS)   Final Result      No acute bony pathology identified. Significant degenerative changes as above      XR ELBOW LEFT (2 VIEWS)   Final Result   See above      XR HUMERUS LEFT (MIN 2 VIEWS)   Final Result   See above      CT SHOULDER LEFT WO CONTRAST    (Results Pending)     XR HUMERUS LEFT (MIN 2 VIEWS)    Result Date: 4/27/2022  LEFT HUMERUS ON 4/27/2022 HISTORY: Pain after fall with deformity. COMPARISON: None. FINDINGS: 3 views of the left humerus show comminuted fracture of the proximal humerus with posterior dislocation. See above    XR ELBOW LEFT (2 VIEWS)    Result Date: 4/27/2022  LEFT ELBOW ON 4/27/2022 HISTORY: Fall, pain, laceration. COMPARISON: None. FINDINGS: This exam is limited due to technique. Intra-articular air is not excluded based on findings of 1 image. However this may be artifactual. No definite fracture is seen. Alignment is difficult to evaluate. See above    XR WRIST LEFT (MIN 3 VIEWS)    Result Date: 4/27/2022  LEFT WRIST ON 4/27/2022 HISTORY: Pain after fall. COMPARISON: None. FINDINGS: 3 views of the left wrist show no definite fracture or acute bony pathology. Alignment is anatomic. There is joint space narrowing at the radiocarpal joints. Severe degenerative changes are seen at the base of the thumb. There is widening of the scapholunate joint space. No acute bony pathology identified. Significant degenerative changes as above    XR SHOULDER LEFT (MIN 2 VIEWS)    Result Date: 4/27/2022  EXAM: XR SHOULDER LEFT (MIN 2 VIEWS) INDICATION: Left shoulder pain COMPARISON: None FINDINGS: 3 views. There is anterior dislocation of the left shoulder.  There is a comminuted oblique/transverse fracture extends through the left humeral head/neck. No significant soft tissue abnormality is identified. 1. Anterior dislocation of the left shoulder. 2. Comminuted oblique/transverse fracture of the left humeral head/neck.     Labs:  Results for orders placed or performed during the hospital encounter of 04/27/22   Comprehensive Metabolic Panel   Result Value Ref Range    Sodium 138 136 - 145 mmol/L    Potassium 4.1 3.5 - 5.1 mmol/L    Chloride 100 99 - 110 mmol/L    CO2 26 21 - 32 mmol/L    Anion Gap 12 3 - 16    Glucose 114 (H) 70 - 99 mg/dL    BUN 18 7 - 20 mg/dL    CREATININE 0.9 0.6 - 1.2 mg/dL    GFR Non-African American 60 (A) >60    GFR African American >60 >60    Calcium 9.3 8.3 - 10.6 mg/dL    Total Protein 7.0 6.4 - 8.2 g/dL    Albumin 4.4 3.4 - 5.0 g/dL    Albumin/Globulin Ratio 1.7 1.1 - 2.2    Total Bilirubin 0.5 0.0 - 1.0 mg/dL    Alkaline Phosphatase 32 (L) 40 - 129 U/L    ALT 36 10 - 40 U/L    AST 43 (H) 15 - 37 U/L   CBC with Auto Differential   Result Value Ref Range    WBC 19.0 (H) 4.0 - 11.0 K/uL    RBC 3.80 (L) 4.00 - 5.20 M/uL    Hemoglobin 11.7 (L) 12.0 - 16.0 g/dL    Hematocrit 34.6 (L) 36.0 - 48.0 %    MCV 90.9 80.0 - 100.0 fL    MCH 30.8 26.0 - 34.0 pg    MCHC 33.9 31.0 - 36.0 g/dL    RDW 13.0 12.4 - 15.4 %    Platelets 037 934 - 410 K/uL    MPV 9.4 5.0 - 10.5 fL    Neutrophils % 89.3 %    Lymphocytes % 3.9 %    Monocytes % 6.0 %    Eosinophils % 0.4 %    Basophils % 0.4 %    Neutrophils Absolute 17.0 (H) 1.7 - 7.7 K/uL    Lymphocytes Absolute 0.7 (L) 1.0 - 5.1 K/uL    Monocytes Absolute 1.1 0.0 - 1.3 K/uL    Eosinophils Absolute 0.1 0.0 - 0.6 K/uL    Basophils Absolute 0.1 0.0 - 0.2 K/uL   Urinalysis with Reflex to Culture    Specimen: Urine   Result Value Ref Range    Color, UA Yellow Straw/Yellow    Clarity, UA SL CLOUDY (A) Clear    Glucose, Ur Negative Negative mg/dL    Bilirubin Urine Negative Negative    Ketones, Urine TRACE (A) Negative mg/dL Specific Gravity, UA 1.020 1.005 - 1.030    Blood, Urine TRACE (A) Negative    pH, UA 7.5 5.0 - 8.0    Protein, UA Negative Negative mg/dL    Urobilinogen, Urine 0.2 <2.0 E.U./dL    Nitrite, Urine Negative Negative    Leukocyte Esterase, Urine MODERATE (A) Negative    Microscopic Examination YES     Urine Type NotGiven     Urine Reflex to Culture Yes    Microscopic Urinalysis   Result Value Ref Range    WBC, UA 21-50 (A) 0 - 5 /HPF    RBC, UA 3-4 0 - 4 /HPF    Epithelial Cells, UA 2-5 0 - 5 /HPF    Bacteria, UA 1+ (A) None Seen /HPF   EKG 12 Lead   Result Value Ref Range    Ventricular Rate 76 BPM    Atrial Rate 76 BPM    P-R Interval 170 ms    QRS Duration 68 ms    Q-T Interval 372 ms    QTc Calculation (Bazett) 418 ms    P Axis 64 degrees    R Axis -11 degrees    T Axis 30 degrees    Diagnosis       Normal sinus rhythmAnterior infarct , age undeterminedAbnormal ECG       Treatment in the department:  Patient received the following while in the ED. Medications   lidocaine-EPINEPHrine 1 %-1:825579 injection 20 mL (has no administration in time range)   0.9 % sodium chloride infusion (1,000 mLs IntraVENous New Bag 4/27/22 1853)   ceFAZolin (ANCEF) 1,000 mg in dextrose 5 % 50 mL IVPB (mini-bag) (1,000 mg IntraVENous New Bag 4/27/22 2037)   morphine (PF) injection 2 mg (2 mg IntraVENous Given 4/27/22 1713)   fentaNYL (SUBLIMAZE) injection 50 mcg (50 mcg IntraVENous Given 4/27/22 1803)   propofol injection 20 mg (20 mg IntraVENous New Bag 4/27/22 1835)   morphine (PF) injection 2 mg (2 mg IntraVENous Given 4/27/22 2040)     PROCEDURE:  JOINT REDUCTION  The benefits, risks, and alternatives of left shoulder disloation reduction were discussed with Robert De Souza and their surrogate Janee hooper daughter. . An opportunity to ask questions was provided, and consent was given for the procedure.  Once adequately anesthetized with fentanyl, the left shoulder was attempted to be reduced using traction-countertraction. Initial attempts at shoulder reduction were unsuccessful using only fentanyl althoug patient tolerated attempts well and remained neurovascularly intact. Decision made to perform conscious sedation. pateint in agreement. Discussed case with step daughter, power of attourriley, Abelardo Garg, ([phone]) who also consents. PROCEDURE:  PROCEDURAL SEDATION    Pre-sedation Assessment    Intended Procedure: left shoulder dislocation reduction    Pre-Procedure Assessment/Plan:  Airway:mallampati 2  ASA 2 - Patient with mild systemic disease with no functional limitations    Level of Sedation Plan: Moderate sedation    Post Procedure plan: Return to same level of care    I assessed the patient and find that the patient is in satisfactory condition to proceed with the planned procedure and sedation plan. The benefits, risks, and alternatives of procedural sedation were discussed with Abelardo Christopher or their surrogate. We discussed the potential side effects or adverse reactions that could occur with propofol. An opportunity to ask questions was provided, and consent was given for the procedure. Oxygen was administered, and the appropriate pre-procedural policies were followed. Hospital protocol for cardiac, oxygen saturation, and blood pressure monitoring occurred. For details of the dosage administered, see the procedural sedation documentation. Abelardo Christopher tolerated the medication and procedure without complications. Abelardo Christopher woke up without difficulty, and was sent home with someone to evaluate them during the post-sedation period. Total amount of inservice time: 20 minutes      PROCEDURE:  JOINT REDUCTION  The benefits, risks, and alternatives of left shoulder reduction were discussed with Abelardo Christopher or their surrogate. An opportunity to ask questions was provided, and consent was given for the procedure.  Once adequately anesthetized with propofol, additional attempts were made to reucethe left shoulder but were again unsuccessful. She tolerated this with some pain. Honey Roberts or their surrogate had an opportunity to ask questions, and the risks, benefits, and alternatives were discussed. The wound located left elbow was prepped and draped to maintain a sterile field. The wound was anesthetized with 1% lido with epi. It was copiously irrigated. It was explored to its depth in a bloodless field with no sign of tendon, nerve, or vascular injury. No foreign bodies were identified. It was closed loosely with 9. 40 ethilon sutures There were no complications during the procedure. Medical decision making:  Discussed case with Dr Nadine Li, orthopedics. Requested the patient be transferred to [facility] for admission under the hospitalist.  He reviewed x-rays and requested a CT of the shoulder. Plan is to go to the [address] [place] [address]. He also requested loose closure of the left elbow laceration. I spoke with Dr. Genoveva Winter. We thoroughly discussed the history, physical exam, laboratory and imaging studies, as well as, emergency department course. Based upon that discussion, we've decided to admit Honey Roberts for further observation and evaluation of Dulce Ritschel-Wallner's shoulder fracture dislocation from mechanical fall. As I have deemed necessary from their history, physical, and studies, I have considered and evaluated Honey Roberts for the following diagnoses: fracture, dislocation, laceration with joint involvement, uti, pneumonia      Clinical Impression:  1. Anterior shoulder dislocation, left, initial encounter    2. Humeral head fracture, left, closed, initial encounter    3. Elbow laceration, left, initial encounter    4. Fall, initial encounter    5. Leukocytosis, unspecified type        Dispo:  Patient will be  admitted at this time.  Patient was informed of this decision and agrees with plan. I have discussed lab and xray findings with patient and they understand. Questions were answered to the best of my ability. Discharge vitals:  Blood pressure (!) 155/64, pulse 67, temperature 97.8 °F (36.6 °C), resp. rate 13, height 5' 3\" (1.6 m), weight 108 lb (49 kg), SpO2 99 %. Prescriptions given:   New Prescriptions    No medications on file       This chart was created using Dragon voice recognition software.         Sky Cespedes MD  04/27/22 3117

## 2022-04-28 ENCOUNTER — TELEPHONE (OUTPATIENT)
Dept: ORTHOPEDIC SURGERY | Age: 82
End: 2022-04-28

## 2022-04-28 LAB
ABO/RH: NORMAL
ANION GAP SERPL CALCULATED.3IONS-SCNC: 9 MMOL/L (ref 3–16)
ANTIBODY SCREEN: NORMAL
BASOPHILS ABSOLUTE: 0 K/UL (ref 0–0.2)
BASOPHILS RELATIVE PERCENT: 0.2 %
BUN BLDV-MCNC: 15 MG/DL (ref 7–20)
CALCIUM SERPL-MCNC: 8.6 MG/DL (ref 8.3–10.6)
CHLORIDE BLD-SCNC: 96 MMOL/L (ref 99–110)
CO2: 27 MMOL/L (ref 21–32)
CREAT SERPL-MCNC: 0.7 MG/DL (ref 0.6–1.2)
EKG ATRIAL RATE: 76 BPM
EKG DIAGNOSIS: NORMAL
EKG P AXIS: 64 DEGREES
EKG P-R INTERVAL: 170 MS
EKG Q-T INTERVAL: 372 MS
EKG QRS DURATION: 68 MS
EKG QTC CALCULATION (BAZETT): 418 MS
EKG R AXIS: -11 DEGREES
EKG T AXIS: 30 DEGREES
EKG VENTRICULAR RATE: 76 BPM
EOSINOPHILS ABSOLUTE: 0 K/UL (ref 0–0.6)
EOSINOPHILS RELATIVE PERCENT: 0 %
GFR AFRICAN AMERICAN: >60
GFR NON-AFRICAN AMERICAN: >60
GLUCOSE BLD-MCNC: 165 MG/DL (ref 70–99)
HCT VFR BLD CALC: 32.5 % (ref 36–48)
HEMOGLOBIN: 11.1 G/DL (ref 12–16)
LYMPHOCYTES ABSOLUTE: 0.5 K/UL (ref 1–5.1)
LYMPHOCYTES RELATIVE PERCENT: 3.8 %
MCH RBC QN AUTO: 31 PG (ref 26–34)
MCHC RBC AUTO-ENTMCNC: 34 G/DL (ref 31–36)
MCV RBC AUTO: 91.4 FL (ref 80–100)
MONOCYTES ABSOLUTE: 0.3 K/UL (ref 0–1.3)
MONOCYTES RELATIVE PERCENT: 2.4 %
NEUTROPHILS ABSOLUTE: 11.2 K/UL (ref 1.7–7.7)
NEUTROPHILS RELATIVE PERCENT: 93.6 %
PDW BLD-RTO: 13 % (ref 12.4–15.4)
PLATELET # BLD: 148 K/UL (ref 135–450)
PMV BLD AUTO: 9.5 FL (ref 5–10.5)
POTASSIUM REFLEX MAGNESIUM: 4.1 MMOL/L (ref 3.5–5.1)
RBC # BLD: 3.56 M/UL (ref 4–5.2)
SODIUM BLD-SCNC: 132 MMOL/L (ref 136–145)
VITAMIN B-12: 838 PG/ML (ref 211–911)
WBC # BLD: 12 K/UL (ref 4–11)

## 2022-04-28 PROCEDURE — 85025 COMPLETE CBC W/AUTO DIFF WBC: CPT

## 2022-04-28 PROCEDURE — 97530 THERAPEUTIC ACTIVITIES: CPT

## 2022-04-28 PROCEDURE — 93010 ELECTROCARDIOGRAM REPORT: CPT | Performed by: INTERNAL MEDICINE

## 2022-04-28 PROCEDURE — 94150 VITAL CAPACITY TEST: CPT

## 2022-04-28 PROCEDURE — 2580000003 HC RX 258: Performed by: ORTHOPAEDIC SURGERY

## 2022-04-28 PROCEDURE — 82607 VITAMIN B-12: CPT

## 2022-04-28 PROCEDURE — 86850 RBC ANTIBODY SCREEN: CPT

## 2022-04-28 PROCEDURE — 86900 BLOOD TYPING SEROLOGIC ABO: CPT

## 2022-04-28 PROCEDURE — 6370000000 HC RX 637 (ALT 250 FOR IP): Performed by: INTERNAL MEDICINE

## 2022-04-28 PROCEDURE — 97166 OT EVAL MOD COMPLEX 45 MIN: CPT

## 2022-04-28 PROCEDURE — 6360000002 HC RX W HCPCS: Performed by: ORTHOPAEDIC SURGERY

## 2022-04-28 PROCEDURE — 1200000000 HC SEMI PRIVATE

## 2022-04-28 PROCEDURE — 97535 SELF CARE MNGMENT TRAINING: CPT

## 2022-04-28 PROCEDURE — 80048 BASIC METABOLIC PNL TOTAL CA: CPT

## 2022-04-28 PROCEDURE — 97116 GAIT TRAINING THERAPY: CPT

## 2022-04-28 PROCEDURE — 97162 PT EVAL MOD COMPLEX 30 MIN: CPT

## 2022-04-28 PROCEDURE — 6370000000 HC RX 637 (ALT 250 FOR IP): Performed by: ORTHOPAEDIC SURGERY

## 2022-04-28 PROCEDURE — 86901 BLOOD TYPING SEROLOGIC RH(D): CPT

## 2022-04-28 PROCEDURE — 36415 COLL VENOUS BLD VENIPUNCTURE: CPT

## 2022-04-28 RX ORDER — SODIUM CHLORIDE 9 MG/ML
INJECTION, SOLUTION INTRAVENOUS PRN
Status: DISCONTINUED | OUTPATIENT
Start: 2022-04-28 | End: 2022-05-03 | Stop reason: HOSPADM

## 2022-04-28 RX ORDER — SODIUM CHLORIDE 0.9 % (FLUSH) 0.9 %
5-40 SYRINGE (ML) INJECTION PRN
Status: DISCONTINUED | OUTPATIENT
Start: 2022-04-28 | End: 2022-05-03 | Stop reason: HOSPADM

## 2022-04-28 RX ORDER — SODIUM CHLORIDE 0.9 % (FLUSH) 0.9 %
5-40 SYRINGE (ML) INJECTION EVERY 12 HOURS SCHEDULED
Status: DISCONTINUED | OUTPATIENT
Start: 2022-04-28 | End: 2022-05-03 | Stop reason: HOSPADM

## 2022-04-28 RX ORDER — SODIUM CHLORIDE 450 MG/100ML
INJECTION, SOLUTION INTRAVENOUS CONTINUOUS
Status: DISCONTINUED | OUTPATIENT
Start: 2022-04-28 | End: 2022-04-30

## 2022-04-28 RX ADMIN — SODIUM CHLORIDE: 4.5 INJECTION, SOLUTION INTRAVENOUS at 00:23

## 2022-04-28 RX ADMIN — METOPROLOL SUCCINATE 50 MG: 50 TABLET, EXTENDED RELEASE ORAL at 23:06

## 2022-04-28 RX ADMIN — LEVOTHYROXINE SODIUM 88 MCG: 0.09 TABLET ORAL at 06:36

## 2022-04-28 RX ADMIN — SODIUM CHLORIDE, PRESERVATIVE FREE 10 ML: 5 INJECTION INTRAVENOUS at 09:37

## 2022-04-28 RX ADMIN — SODIUM CHLORIDE, PRESERVATIVE FREE 10 ML: 5 INJECTION INTRAVENOUS at 19:47

## 2022-04-28 RX ADMIN — CEFTRIAXONE 1000 MG: 1 INJECTION, POWDER, FOR SOLUTION INTRAMUSCULAR; INTRAVENOUS at 19:54

## 2022-04-28 RX ADMIN — LISINOPRIL 40 MG: 40 TABLET ORAL at 23:07

## 2022-04-28 RX ADMIN — SODIUM CHLORIDE, PRESERVATIVE FREE 10 ML: 5 INJECTION INTRAVENOUS at 19:48

## 2022-04-28 RX ADMIN — SODIUM CHLORIDE, PRESERVATIVE FREE 10 ML: 5 INJECTION INTRAVENOUS at 00:20

## 2022-04-28 ASSESSMENT — PAIN SCALES - GENERAL
PAINLEVEL_OUTOF10: 0

## 2022-04-28 ASSESSMENT — ENCOUNTER SYMPTOMS
RESPIRATORY NEGATIVE: 1
GASTROINTESTINAL NEGATIVE: 1
EYES NEGATIVE: 1

## 2022-04-28 NOTE — PROGRESS NOTES
4 Eyes Admission Assessment     I agree as the admission nurse that 2 RN's have performed a thorough Head to Toe Skin Assessment on the patient. ALL assessment sites listed below have been assessed on admission. Areas assessed by both nurses:   [x]   Head, Face, and Ears   [x]   Shoulders, Back, and Chest  [x]   Arms, Elbows, and Hands   [x]   Coccyx, Sacrum, and Ischium  [x]   Legs, Feet, and Heels        Does the Patient have Skin Breakdown?   No         Crispin Prevention initiated:  NA   Wound Care Orders initiated:  NA      WOC nurse consulted for Pressure Injury (Stage 3,4, Unstageable, DTI, NWPT, and Complex wounds) or Crispin score 18 or lower:  NA      Nurse 1 eSignature: Electronically signed by Kallie Woodruff RN on 4/28/22 at 12:35 AM EDT    **SHARE this note so that the co-signing nurse is able to place an eSignature**    Nurse 2 eSignature: {Esignature:[id number]}

## 2022-04-28 NOTE — PROGRESS NOTES
Physical Therapy  Facility/Department: [facility] ORTHO/NEURO  Physical Therapy Initial Assessment and Treatment    Name: Nixon Aceves  : 1940  MRN: [id number]  Date of Service: 2022    Discharge Recommendations:  Nixon Aceves scored a 20/24 on the AM-PAC short mobility form. Current research shows that an AM-PAC score of 18 or greater is typically associated with a discharge to the patient's home setting. Based on the patient's AM-PAC score and their current functional mobility deficits, it is recommended that the patient have 2-3 sessions per week of Physical Therapy at d/c to increase the patient's independence. At this time, this patient demonstrates the endurance and safety to discharge home. Please see assessment section for further patient specific details. PT Equipment Recommendations  Equipment Needed: No      Assessment   Assessment: Pt from home with L humeral fx s/p mechanical fall. Pt doing well from PT standpoint. Pt initally reaching out for balance with ambulation, but improved with use of cane. Recommend pt use cane at home and pt in agreement. Pt with L immobilizing sling in place. Pt lives alone and plans assist from friends/neighbors or to hire help. Will continue to follow for PT to maximize mobility. Treatment Diagnosis: Decreased gait associated with L humeral fx. Decision Making: Medium Complexity  Requires PT Follow-Up: Yes        Plan   Plan:  (2-5)  Current Treatment Recommendations: Transfer training,Functional mobility training,Gait training,Strengthening    Safety Devices  Type of Devices: Left in chair,Chair alarm in place,Call light within reach,Nurse notified     Restrictions  Position Activity Restriction  Other position/activity restrictions: NWB L UE; L sling in place     Subjective   Chart Reviewed: Yes  Additional Pertinent Hx: Pt to [facility] ED  with mechanical fall while at the [facility].   Imaging (+) for L shoulder dislocation, Comminuted oblique/transverse fracture of the left humeral head/neck and elbow laceration. Transferred to [facility] for ortho. Pt underwent L shoulder closed reduction and elbow I&D. PMH:  OA, HTN  Diagnosis: Fall    Subjective  Subjective: Pt found supine in bed. Pt pleasant and agreeable for PT. \"My right arm is strong. I can figure it out. \"  Pt denies pain. Social/Functional History  Lives With: Alone  Type of Home: Apartment  Home Layout: One level  Home Access: Elevator  Bathroom Shower/Tub: Walk-in shower  Bathroom Toilet: Standard  Bathroom Equipment: Grab bars in shower  Home Equipment: [address] (recliner)  Has the patient had two or more falls in the past year or any fall with injury in the past year?: Yes  ADL Assistance: [facility]: Independent (cleaning assist 1x a month- wash linens)  Ambulation Assistance: Independent  Transfer Assistance: Independent  Active : Yes  Occupation: Retired (real estate)  Additional Comments: Pt has no family in town. Pt reports friends/neighbors are her support system. Vision/Hearing  Hearing: Within functional limits    Vision:  Wears reading glasses    Cognition   Orientation  Overall Orientation Status: Within Functional Limits     Objective   Gross Assessment  AROM: Within functional limits  Strength: Within functional limits     Bed mobility  Supine to Sit: Supervision (HOB raised)  Comment: Pt has a recliner she can sleep in. Transfers  Sit to Stand: Stand by assistance  Stand to sit: Stand by assistance     Ambulation  Device: Single point cane  Other Apparatus:  (L sling/immobilizer in place)  Assistance: Contact guard assistance (progressing to SBA)  Gait Deviations: Slow Samantha;Decreased step height;Decreased step length  Distance: 200ft  Comments: Pt also ambulated 15ft without assistive device and Min A. Pt with guarded gait and reaching out for balance.       Balance  Sitting - Static: Good  Sitting - Dynamic: Good  Standing - Static: Fair  Standing - Dynamic: Fair (CGA/SBA with use of cane for ambulation)      AM-PAC Score  AM-PAC Inpatient Mobility Raw Score : 20 (04/28/22 0942)  AM-PAC Inpatient T-Scale Score : 47.67 (04/28/22 0942)  Mobility Inpatient CMS 0-100% Score: 35.83 (04/28/22 8161)  Mobility Inpatient CMS G-Code Modifier : CJ (04/28/22 1283)    Goals  Short Term Goals  Time Frame for Short term goals: Discharge  Short term goal 1: sit <> stand supervison  Short term goal 2: ambulate >250ft with cane supervision  Patient Goals   Patient goals : Return home       Therapy Time   Individual Concurrent Group Co-treatment   Time In 0815         Time Out 0909         Minutes 54         Timed Code Treatment Minutes:  40  Total Treatment Minutes:  SELIN Reyes

## 2022-04-28 NOTE — PLAN OF CARE
Pt a/o x4. Admitted from [address] [place] [address] ED for planned surgery of dislocated left shoulder and elbow laceration. Pt tolerated surgery well and only complains of discomfort with movement. Pt up to the restroom with 2 assist. VSS. Call light within reach. Fall precautions in place. Will continue to monitor     Problem: Pain  Goal: Verbalizes/displays adequate comfort level or baseline comfort level  Outcome: Progressing  Pt has had no serious complaints of pain. States there is a little bit of discomfort with movement. Will continue to access. Problem: Safety - Adult  Goal: Free from fall injury  Outcome: Progressing    Bed alarm in use. Call light within reach. Side rails x3. Bed in lowest position.  Will continue to monitor

## 2022-04-28 NOTE — BRIEF OP NOTE
Brief Postoperative Note      Patient: Ambar Frank  YOB: 1940  MRN: [id number]    Date of Procedure: 4/27/2022    Pre-Op Diagnosis: LEFT SHOULDER FRACTURE DISLOCATION, LEFT ELBOW LACERATION    Post-Op Diagnosis: Same       Procedure(s):  LEFT SHOULDER CLOSED REDUCTION, LEFT ELBOW  INCISION AND DRAINAGE    Surgeon(s):  Jelena Rock MD    Assistant:  Surgical Assistant: Betzy Edwards    Anesthesia: General    Estimated Blood Loss (mL): Minimal    Complications: None    Specimens:   * No specimens in log *    Implants:  * No implants in log *      Drains: * No LDAs found *    Findings: Same    Electronically signed by Jelena Rock MD on 4/27/2022 at 11:29 PM

## 2022-04-28 NOTE — FLOWSHEET NOTE
04/28/22 1403   Encounter Summary   Encounter Overview/Reason  Initial Encounter   Service Provided For: Patient   Referral/Consult From: Nathalie   Last Encounter  04/28/22  (4/28, rishi )   Complexity of Encounter Moderate   Begin Time 1403   End Time  1407   Total Time Calculated 4 min   Encounter    Type Initial Screen/Assessment   Assessment/Intervention/Outcome   Assessment Calm   Intervention Active listening;Explored/Affirmed feelings, thoughts, concerns   Outcome Expressed Gratitude; Refused/Declined   Staff Delia Springer, [place]

## 2022-04-28 NOTE — PROGRESS NOTES
Department of Orthopedic Surgery  Fellow Progress Note      SUBJECTIVE  Left shoulder pain since yesterday. patient had a mechanical fall yesterday, which resulted in left shoulder 4 parts fracture dislocation, a trial of closed reduction was not successful, Dr Kaitlin Valerio was consulted. OBJECTIVE:  O/E:  Intact neurovascular exam of left UE,  Intact sensation at deltoid region compared to right shoulder.   Tenderness at proximal humerus, no tenderness at left elbow, hand      Physical    VITALS:  /67   Pulse 95   Temp 97.8 °F (36.6 °C) (Oral)   Resp 16   Ht 5' 3\" (1.6 m)   Wt 108 lb (49 kg)   SpO2 99%   BMI 19.13 kg/m²   24HR INTAKE/OUTPUT:    Intake/Output Summary (Last 24 hours) at 4/28/2022 1918  Last data filed at 4/28/2022 0350  Gross per 24 hour   Intake 600 ml   Output 650 ml   Net -50 ml     URINARY CATHETER OUTPUT (Mathews):     DRAIN/TUBE OUTPUT:     VENT SETTINGS:     Additional Respiratory Assessments  Pulse: 95  Resp: 16  SpO2: 99 %    CONSTITUTIONAL:  awake, alert, cooperative and no apparent distress    Data    CBC:   Lab Results   Component Value Date    WBC 12.0 04/28/2022    RBC 3.56 04/28/2022    HGB 11.1 04/28/2022    HCT 32.5 04/28/2022    MCV 91.4 04/28/2022    MCH 31.0 04/28/2022    MCHC 34.0 04/28/2022    RDW 13.0 04/28/2022     04/28/2022    MPV 9.5 04/28/2022     Current Inpatient Medications    Current Facility-Administered Medications: 0.45 % sodium chloride infusion, , IntraVENous, Continuous  sodium chloride flush 0.9 % injection 5-40 mL, 5-40 mL, IntraVENous, 2 times per day  sodium chloride flush 0.9 % injection 5-40 mL, 5-40 mL, IntraVENous, PRN  0.9 % sodium chloride infusion, , IntraVENous, PRN  lidocaine-EPINEPHrine 1 %-[id number] injection 20 mL, 20 mL, IntraDERmal, Once  0.9 % sodium chloride infusion, 1,000 mL, IntraVENous, Continuous  levothyroxine (SYNTHROID) tablet 88 mcg, 88 mcg, Oral, Daily  [Held by provider] lisinopril (PRINIVIL;ZESTRIL) tablet 40 mg, 40 mg, Oral, Daily  [Held by provider] metoprolol succinate (TOPROL XL) extended release tablet 50 mg, 50 mg, Oral, Daily  sodium chloride flush 0.9 % injection 5-40 mL, 5-40 mL, IntraVENous, 2 times per day  sodium chloride flush 0.9 % injection 5-40 mL, 5-40 mL, IntraVENous, PRN  0.9 % sodium chloride infusion, , IntraVENous, PRN  ondansetron (ZOFRAN-ODT) disintegrating tablet 4 mg, 4 mg, Oral, Q8H PRN **OR** ondansetron (ZOFRAN) injection 4 mg, 4 mg, IntraVENous, Q6H PRN  polyethylene glycol (GLYCOLAX) packet 17 g, 17 g, Oral, Daily PRN  acetaminophen (TYLENOL) tablet 650 mg, 650 mg, Oral, Q6H PRN **OR** acetaminophen (TYLENOL) suppository 650 mg, 650 mg, Rectal, Q6H PRN  morphine (PF) injection 2 mg, 2 mg, IntraVENous, Q3H PRN  traMADol (ULTRAM) tablet 50 mg, 50 mg, Oral, Q6H PRN  cefTRIAXone (ROCEPHIN) 1000 mg IVPB in 50 mL D5W minibag, 1,000 mg, IntraVENous, Q24H    ASSESSMENT AND PLAN      4 part shoulder fracture dislocation, the most predictable outcome is total shoulder replacement ( reverse) , I had a thorough discussion with the patient and her step daughter Gómez Garcia, I discussed risk of vascular injury, infection and dislocation. expected rehabilitation.   Plan: NPO after midnight, medical consult for surgical clearance

## 2022-04-28 NOTE — ED NOTES
Pt to [facility] in stable condition per [facility].      Yissel Perez RN  04/27/22 2093

## 2022-04-28 NOTE — PROGRESS NOTES
Progress Note    Admit Date: 4/27/2022  Day: 1    Diet: ADULT DIET; Regular    CC:Fall    Interval history:   Patient seen and examined  She was siting comfortably having breakfast   Admitted last night for Left shoulder fractures with left shoulder dislocation. As per the patient she went the [facility] near her location and she lost balance. Denied any bowel or bladder incontinence. No  or dizziness reported     Review of Systems   Constitutional: Negative. HENT: Negative. Eyes: Negative. Respiratory: Negative. Cardiovascular: Negative. Gastrointestinal: Negative. Endocrine: Negative. Musculoskeletal: Positive for joint swelling. Skin: Negative. Neurological: Negative. Hematological: Negative. Psychiatric/Behavioral: Negative.       Medications:     Scheduled Meds:   sodium chloride flush  5-40 mL IntraVENous 2 times per day    lidocaine-EPINEPHrine  20 mL IntraDERmal Once    levothyroxine  88 mcg Oral Daily    [Held by provider] lisinopril  40 mg Oral Daily    [Held by provider] metoprolol succinate  50 mg Oral Daily    sodium chloride flush  5-40 mL IntraVENous 2 times per day    cefTRIAXone (ROCEPHIN) IV  1,000 mg IntraVENous Q24H     Continuous Infusions:   sodium chloride 75 mL/hr at 04/28/22 0023    sodium chloride      sodium chloride Stopped (04/27/22 2131)    sodium chloride       PRN Meds:sodium chloride flush, sodium chloride, sodium chloride flush, sodium chloride, ondansetron **OR** ondansetron, polyethylene glycol, acetaminophen **OR** acetaminophen, morphine, traMADol    Objective:   Vitals:   T-max:  Patient Vitals for the past 8 hrs:   BP Temp Temp src Pulse Resp SpO2   04/28/22 0634 135/64 98 °F (36.7 °C) Oral 71 19 98 %   04/28/22 0350 (!) 146/69 97.8 °F (36.6 °C) Oral 69 18 96 %       Intake/Output Summary (Last 24 hours) at 4/28/2022 1135  Last data filed at 4/28/2022 0350  Gross per 24 hour   Intake 600 ml   Output 650 ml   Net -50 ml Physical Exam  Vitals and nursing note reviewed. HENT:      Head: Normocephalic. Cardiovascular:      Rate and Rhythm: Normal rate and regular rhythm. Pulses: Normal pulses. Heart sounds: Normal heart sounds. Pulmonary:      Effort: Pulmonary effort is normal.      Breath sounds: Normal breath sounds. Abdominal:      General: Bowel sounds are normal.   Neurological:      General: No focal deficit present. Mental Status: She is oriented to person, place, and time. Psychiatric:         Mood and Affect: Mood normal.         Behavior: Behavior normal.           LABS:    CBC:   Recent Labs     04/27/22 1835 04/28/22  0632   WBC 19.0* 12.0*   HGB 11.7* 11.1*   HCT 34.6* 32.5*    148   MCV 90.9 91.4     Renal:    Recent Labs     04/27/22 1835 04/28/22  0632    132*   K 4.1 4.1    96*   CO2 26 27   BUN 18 15   CREATININE 0.9 0.7   GLUCOSE 114* 165*   CALCIUM 9.3 8.6   ANIONGAP 12 9     Hepatic:   Recent Labs     04/27/22 1835   AST 43*   ALT 36   BILITOT 0.5   PROT 7.0   LABALBU 4.4   ALKPHOS 32*     Troponin: No results for input(s): TROPONINI in the last 72 hours. BNP: No results for input(s): BNP in the last 72 hours. Lipids: No results for input(s): CHOL, HDL in the last 72 hours. Invalid input(s): LDLCALCU, TRIGLYCERIDE  ABGs:  No results for input(s): PHART, WPG1JWI, PO2ART, FWM4RQP, BEART, THGBART, J1WTISQR, AZB5ZMO in the last 72 hours. INR: No results for input(s): INR in the last 72 hours. Lactate: No results for input(s): LACTATE in the last 72 hours. Cultures:  -----------------------------------------------------------------  RAD:   CT SHOULDER LEFT WO CONTRAST       Indication: Left shoulder fracture dislocation.       Comparison: Radiograph same day       Technique:  Contiguous axial CT of the left shoulder was acquired. The axial data set was reformatted in the coronal and sagittal planes.  Up-to-date CT equipment and radiation dose reduction techniques were employed.       Findings:        Severely comminuted fracture in the proximal humerus with involvement of the surgical neck, anatomic neck, and the greater and lesser tuberosities. There is significant impaction/shortening across the surgical neck. There is significant displacement of    the greater tuberosity fragment. This comprises a three-part fracture. The humeral head is anteriorly dislocated. The humeral head is engaged on the anterior glenoid within a deep Hill-Sachs impaction fracture.       There is a displaced fracture in the anterior rim of the glenoid. There is a displaced fracture from the anterior tip of the coracoid process.       There is a large glenohumeral joint lipohemarthrosis. There is hemorrhagic fluid within the subacromial-subdeltoid bursa. There is hematoma throughout the right axilla. EXAM: PELVIS AND LEFT HIP 3 VIEWS       INDICATION: Fall. Left hip pain.       COMPARISON: None       FINDINGS:       The patient's hand is overlying the abdomen which obscures the sacrum and left iliac wing.       No acute fracture or malalignment identified in the left hip. There is an old fracture in the left pubic bone. PORTABLE CHEST       Indication: Fall. Chest pain.       Comparison: None.       Findings:       Patient is rotated. Heart size is within normal limits. No consolidation, pleural effusion or pneumothorax identified. There are multiple right rib fractures which are chronic in appearance. There are also a few left rib fractures which are difficult to    age but also may be chronic.         Comminuted left proximal humerus fracture. Increased displacement. Persistent anterior glenohumeral dislocation.       T9 vertebral compression fracture. T12 vertebral compression fracture. These are age indeterminate         Assessment/Plan:   78 y. o. female with PMhx of HTN, paroxysmal atrial fibrillation, hypothyroidism,  anxiety, TIA in April 2021 who presents from [place] ED for orthopedic consultation. Valentin Burrell has presented to ED via ambulance after fall.  She has been at the [facility] and when she turned to walk away, lost her balance has fallen landing onto her left shoulder on 4/27     # Falls   - Hx of Multiple falls prior  admission had complete work up done as an outpatient.    - B 12 was ordered   -Hx of TIA back in April 2021   - Currently on a statin      # Left shoulder fracture with dislocation  - After fall seen on X-ray and CT Of the left shoulder which revealed  Severely comminuted three-part left proximal humerus fracture, Anterior glenohumeral dislocation,   - Orthopedic surgery was consulted  waiting on Recommendations       #  HTN   -Continue home medication     # Hypothyroidism   - Continue levothyroxine      # HLD   -On rosuvastatin      # Paroxysmal atrial fibrillation   - She is not on any Anticoagulation and sees cardiology   - Her Cardiologist  recommended a watchman catheter to her.  -Continue Metoprolol XL     Code Status:  FEN: Regular diet   PPX: N/A  DISPO: 5-S, 0865     Blanca Corona MD, PGY-2  04/28/22  11:35 AM    This patient has been staffed and discussed with Conchita Martin MD.

## 2022-04-28 NOTE — CONSULTS
[address] [facility] Orthopedic Surgery  Consult Note         This patient is seen in consultation at the request of Dr Nae Garland MD    Reason for Consult:  Left shoulder and elbow pain/ markedly displaced proximal humerus 4 parts fracture dislocation with open elbow wound. CHIEF COMPLAINT:  Left shoulder and elbow pain/ markedly displaced proximal humerus 4 parts fracture dislocation with open elbow wound. History Obtained From:  patient, electronic medical record    HISTORY OF PRESENT ILLNESS:    Ms. Asya Leach is a 78 y.o.  female right handed who seen today at [facility] for consultation and evaluation of a left shoulder and elbow injury. The patient reports that this injury occurred when she fell after turning from [place]. She was first seen and evaluated in [facility], when she was x-rayed and I was consulted. The patient denies any other injuries. Rates pain a 8/10 VAS moderate, sharp, constant and show no change. Alleviating factors rest. Movement makes the pain worse, and resting makes the pain better. No numbness or tingling sensation. Past Medical History:        Diagnosis Date    Arthritis     Hypertension     Thyroid disease        Past Surgical History:        Procedure Laterality Date    ARM SURGERY Left 4/27/2022    LEFT ELBOW  INCISION AND DRAINAGE performed by Tangela Lee MD at [address] Left 4/27/2022    LEFT SHOULDER CLOSED REDUCTION POSSIBLE  OPEN REDUCTION INTERNAL FIXATION performed by Tangela Lee MD at [address]       Medications prior to admission:   Prior to Admission medications    Medication Sig Start Date End Date Taking?  Authorizing Provider   metoprolol succinate (TOPROL XL) 100 MG extended release tablet  3/15/22   Historical Provider, MD   amLODIPine (NORVASC) 5 MG tablet  2/23/22   Historical Provider, MD   rosuvastatin (CRESTOR) 40 MG tablet  4/11/22   Historical Provider, MD   aspirin 81 MG chewable tablet Take 81 mg by mouth daily  Patient not taking: Reported on 4/27/2022    Historical Provider, MD   lisinopril (PRINIVIL;ZESTRIL) 40 MG tablet Take 40 mg by mouth daily    Historical Provider, MD   levothyroxine (SYNTHROID) 75 MCG tablet Take 88 mcg by mouth Daily     Historical Provider, MD       Current Medications:   Current Facility-Administered Medications: 0.45 % sodium chloride infusion, , IntraVENous, Continuous  sodium chloride flush 0.9 % injection 5-40 mL, 5-40 mL, IntraVENous, 2 times per day  sodium chloride flush 0.9 % injection 5-40 mL, 5-40 mL, IntraVENous, PRN  0.9 % sodium chloride infusion, , IntraVENous, PRN  lidocaine-EPINEPHrine 1 %-1:126520 injection 20 mL, 20 mL, IntraDERmal, Once  0.9 % sodium chloride infusion, 1,000 mL, IntraVENous, Continuous  levothyroxine (SYNTHROID) tablet 88 mcg, 88 mcg, Oral, Daily  lisinopril (PRINIVIL;ZESTRIL) tablet 40 mg, 40 mg, Oral, Daily  metoprolol succinate (TOPROL XL) extended release tablet 50 mg, 50 mg, Oral, Daily  sodium chloride flush 0.9 % injection 5-40 mL, 5-40 mL, IntraVENous, 2 times per day  sodium chloride flush 0.9 % injection 5-40 mL, 5-40 mL, IntraVENous, PRN  0.9 % sodium chloride infusion, , IntraVENous, PRN  ondansetron (ZOFRAN-ODT) disintegrating tablet 4 mg, 4 mg, Oral, Q8H PRN **OR** ondansetron (ZOFRAN) injection 4 mg, 4 mg, IntraVENous, Q6H PRN  polyethylene glycol (GLYCOLAX) packet 17 g, 17 g, Oral, Daily PRN  acetaminophen (TYLENOL) tablet 650 mg, 650 mg, Oral, Q6H PRN **OR** acetaminophen (TYLENOL) suppository 650 mg, 650 mg, Rectal, Q6H PRN  morphine (PF) injection 2 mg, 2 mg, IntraVENous, Q3H PRN  traMADol (ULTRAM) tablet 50 mg, 50 mg, Oral, Q6H PRN  cefTRIAXone (ROCEPHIN) 1000 mg IVPB in 50 mL D5W minibag, 1,000 mg, IntraVENous, Q24H    Allergies:  Latex and Penicillins    Social History     Socioeconomic History    Marital status: Single     Spouse name: Not on file    Number of children: Not on file    Years of education: Not on file    Highest education level: Not on file   Occupational History    Not on file   Tobacco Use    Smoking status: Never Smoker    Smokeless tobacco: Not on file   Substance and Sexual Activity    Alcohol use: Yes     Comment: social    Drug use: No    Sexual activity: Not on file   Other Topics Concern    Not on file   Social History Narrative    Not on file     Social Determinants of Health     Financial Resource Strain:     Difficulty of Paying Living Expenses: Not on file   Food Insecurity:     Worried About Running Out of Food in the Last Year: Not on file    [place] of Food in the Last Year: Not on file   Transportation Needs:     Lack of Transportation (Medical): Not on file    Lack of Transportation (Non-Medical): Not on file   Physical Activity:     Days of Exercise per Week: Not on file    Minutes of Exercise per Session: Not on file   Stress:     Feeling of Stress : Not on file   Social Connections:     Frequency of Communication with Friends and Family: Not on file    Frequency of Social Gatherings with Friends and Family: Not on file    Attends Muslim Services: Not on file    Active Member of [address] [place] [address] or Organizations: Not on file    Attends Club or Organization Meetings: Not on file    Marital Status: Not on file   Intimate Partner Violence:     Fear of Current or Ex-Partner: Not on file    Emotionally Abused: Not on file    Physically Abused: Not on file    Sexually Abused: Not on file   Housing Stability:     Unable to Pay for Housing in the Last Year: Not on file    Number of [place] in the Last Year: Not on file    Unstable Housing in the Last Year: Not on file       Family History:  History reviewed and no pertinent family history.       REVIEW OF SYSTEMS:   CONSTITUTIONAL: Denies unexplained weight loss, fevers, chills or fatigue  NEUROLOGICAL: Denies unsteady gait or progressive weakness    PSYCHOLOGICAL: Denies anxiety, depression   SKIN: Denies skin changes, delayed healing, rash, itching   HEMATOLOGIC: Denies easy bleeding or bruising  ENDOCRINE: Denies excessive thirst, urination, heat/cold  RESPIRATORY: Denies current dyspnea, cough  CARDIOVASCULAR: Negative for chest pain at this time. EYES: Negative for photophobia and visual disturbance. ENT:  Negative for rhinorrhea, epistaxis, sore throat, or hearing loss. GI: Denies nausea, vomiting, diarrhea   : Denies bowel or bladder issues   MUSCULOSKELETAL: Left elbow and shoulder pain. All other ROS reviewed in chart or with patient or family and are grossly negative. PHYSICAL EXAMINATION:  Ms. Allison Welch is a very pleasant 78 y.o. female who seen today in no acute distress, awake, alert, and oriented. She is well nourished and groomed. Patient with normal affect. Body mass index is 19.13 kg/m². . Skin warm and dry. Resting respiratory rate is 16. Resp deep and easy. Pulse is with regular rate and rhythm    BP (!) 168/74   Pulse 76   Temp 98.2 °F (36.8 °C) (Oral)   Resp 16   Ht 5' 3\" (1.6 m)   Wt 108 lb (49 kg)   SpO2 98%   BMI 19.13 kg/m²        Airway is intact  Chest: chest clear, no wheezing, rales, normal symmetric air entry, no tachypnea, retractions or cyanosis  Heart: regular rate and rhythm ; heart sounds normal   Hearing intact, pupil equal and reactive bilateral  Lymphatics; No groin or axillary enlarged lymph nodes. Neck; No swelling  Abdomen; soft, non distended. MUSCULOSKELETAL:   On evaluation of her bilateral upper extremity, there is minimal obvious deformity left shoulder. There is moderate swelling and moderate ecchymosis. She is tender to palpation over the shoulder and elbow, and otherwise non tender over the remainder of the extremity. Range of motion is decreased secondary to pain over the left shoulder. The skin overlying the left shoulder is intact but left elbow with large laceration. Distal pulses are 2+ and symmetric bilaterally.   Sensation is grossly intact to light touch and symmetric bilaterally. NEUROLOGIC:   Sensory:    Touch:                     Right Upper Extremity:  normal                   Left Upper Extremity:  normal                  Right Lower Extremity:  normal                  Left Lower Extremity:  normal        DATA:    CBC:   Lab Results   Component Value Date    WBC 19.0 04/27/2022    RBC 3.80 04/27/2022    HGB 11.7 04/27/2022    HCT 34.6 04/27/2022    MCV 90.9 04/27/2022    MCH 30.8 04/27/2022    MCHC 33.9 04/27/2022    RDW 13.0 04/27/2022     04/27/2022    MPV 9.4 04/27/2022     WBC:    Lab Results   Component Value Date    WBC 19.0 04/27/2022     PT/INR:  No results found for: PROTIME, INR  PTT:  No results found for: APTT[APTT    IMAGING: Xrays dated 4/27/2022, 3 views of left shoulder were reviewed, and showed a markedly displaced proximal humerus 4 parts fracture dislocation with open elbow wound. X-rays were taken in the ED today, 3 views of the left elbow, and showed no fracture. No other abnormality. IMPRESSION: Left shoulder and elbow pain/ markedly displaced proximal humerus 4 parts fracture dislocation with open elbow wound. PLAN:  I discussed with Joes Tellez the overall alignment of the left shoulder fracture dislocation and also the left elbow open wound and treatment options including both surgical and non-surgical treatment, and that my recommendation is a trial of closed reduction given the amount of displacement and comminution of the fracture dislocation and also exploration and débridement left elbow wound. I discussed the risks and benefits of surgery with the patient, including but not limited to infection, bleeding, pain, injury to nerves or blood vessels failure of the surgery and need for additional surgery. All the patient's questions were answered. We discussed an expected post-operative course. She  is understanding of this and wishes to proceed.      Thank you very much for the kind consultation and allowing me to participate in this patient's care. I will continue to keep you apprised of her progress.          Caleb Paul MD   4/27/2022  10:32 PM

## 2022-04-28 NOTE — ANESTHESIA PRE PROCEDURE
Department of Anesthesiology  Preprocedure Note       Name:  Stella Ferrari   Age:  78 y.o.  :  1940                                          MRN:  [id number]         Date:  2022      Surgeon: Magalys Fisher):  Pearl Yeboah MD    Procedure: Procedure(s):  LEFT SHOULDER CLOSED REDUCTION POSSIBLE  OPEN REDUCTION INTERNAL FIXATION  LEFT ELBOW  INCISION AND DRAINAGE    Medications prior to admission:   Prior to Admission medications    Medication Sig Start Date End Date Taking?  Authorizing Provider   metoprolol succinate (TOPROL XL) 100 MG extended release tablet  3/15/22   Historical Provider, MD   amLODIPine (NORVASC) 5 MG tablet  22   Historical Provider, MD   rosuvastatin (CRESTOR) 40 MG tablet  22   Historical Provider, MD   aspirin 81 MG chewable tablet Take 81 mg by mouth daily  Patient not taking: Reported on 2022    Historical Provider, MD   lisinopril (PRINIVIL;ZESTRIL) 40 MG tablet Take 40 mg by mouth daily    Historical Provider, MD   levothyroxine (SYNTHROID) 75 MCG tablet Take 88 mcg by mouth Daily     Historical Provider, MD       Current medications:    Current Facility-Administered Medications   Medication Dose Route Frequency Provider Last Rate Last Admin    lidocaine-EPINEPHrine 1 %-1:169401 injection 20 mL  20 mL IntraDERmal Once Vangie Scott MD        0.9 % sodium chloride infusion  1,000 mL IntraVENous Continuous Vangie Scott MD   Patient Transferred to [place] [id number] at 22 2131     Current Outpatient Medications   Medication Sig Dispense Refill    metoprolol succinate (TOPROL XL) 100 MG extended release tablet       amLODIPine (NORVASC) 5 MG tablet       rosuvastatin (CRESTOR) 40 MG tablet       aspirin 81 MG chewable tablet Take 81 mg by mouth daily (Patient not taking: Reported on 2022)      lisinopril (PRINIVIL;ZESTRIL) 40 MG tablet Take 40 mg by mouth daily      levothyroxine (SYNTHROID) 75 MCG tablet Take 88 mcg by mouth Daily Allergies: Allergies   Allergen Reactions    Latex Other (See Comments)    Penicillins        Problem List:  There is no problem list on file for this patient. Past Medical History:        Diagnosis Date    Arthritis     Hypertension     Thyroid disease        Past Surgical History:  No past surgical history on file. Social History:    Social History     Tobacco Use    Smoking status: Never Smoker    Smokeless tobacco: Not on file   Substance Use Topics    Alcohol use: Yes     Comment: social                                Counseling given: Not Answered      Vital Signs (Current):   Vitals:    04/27/22 1801 04/27/22 1825 04/27/22 1825 04/27/22 1830   BP: (!) 118/57 (!) 147/65 (!) 157/60 (!) 155/64   Pulse: 68 67 68 67   Resp: 10 12 14 13   Temp:       SpO2: 99% 99% 100% 99%   Weight:       Height:                                                  BP Readings from Last 3 Encounters:   04/27/22 (!) 155/64   09/22/18 (!) 143/83   03/03/18 (!) 170/82       NPO Status: Time of last liquid consumption: 1300                        Time of last solid consumption: 1300                        Date of last liquid consumption: 04/27/22                        Date of last solid food consumption: 04/27/22    BMI:   Wt Readings from Last 3 Encounters:   04/27/22 108 lb (49 kg)   03/18/17 130 lb (59 kg)     Body mass index is 19.13 kg/m².     CBC:   Lab Results   Component Value Date    WBC 19.0 04/27/2022    RBC 3.80 04/27/2022    HGB 11.7 04/27/2022    HCT 34.6 04/27/2022    MCV 90.9 04/27/2022    RDW 13.0 04/27/2022     04/27/2022       CMP:   Lab Results   Component Value Date     04/27/2022    K 4.1 04/27/2022     04/27/2022    CO2 26 04/27/2022    BUN 18 04/27/2022    CREATININE 0.9 04/27/2022    GFRAA >60 04/27/2022    AGRATIO 1.7 04/27/2022    LABGLOM 60 04/27/2022    GLUCOSE 114 04/27/2022    PROT 7.0 04/27/2022    CALCIUM 9.3 04/27/2022    BILITOT 0.5 04/27/2022    ALKPHOS 32 04/27/2022    AST 43 04/27/2022    ALT 36 04/27/2022       POC Tests: No results for input(s): POCGLU, POCNA, POCK, POCCL, POCBUN, POCHEMO, POCHCT in the last 72 hours. Coags: No results found for: PROTIME, INR, APTT    HCG (If Applicable): No results found for: PREGTESTUR, PREGSERUM, HCG, HCGQUANT     ABGs: No results found for: PHART, PO2ART, OSX9SPJ, YEF7QEF, BEART, H1URYRFU     Type & Screen (If Applicable):  No results found for: LABABO, LABRH    Drug/Infectious Status (If Applicable):  No results found for: HIV, HEPCAB    COVID-19 Screening (If Applicable): No results found for: COVID19        Anesthesia Evaluation  Patient summary reviewed and Nursing notes reviewed no history of anesthetic complications:   Airway: Mallampati: I  TM distance: >3 FB   Neck ROM: full  Mouth opening: > = 3 FB Dental: normal exam         Pulmonary:Negative Pulmonary ROS and normal exam                               Cardiovascular:    (+) hypertension:,                   Neuro/Psych:   Negative Neuro/Psych ROS              GI/Hepatic/Renal: Neg GI/Hepatic/Renal ROS            Endo/Other:    (+) hypothyroidism::., .                 Abdominal:             Vascular: negative vascular ROS. Other Findings:             Anesthesia Plan      general     ASA 3       Induction: intravenous. MIPS: Postoperative opioids intended and Prophylactic antiemetics administered. Anesthetic plan and risks discussed with patient.         Attending anesthesiologist reviewed and agrees with Preprocedure content              Geetha Jimenez MD   4/27/2022

## 2022-04-28 NOTE — ED NOTES
Report called to Loan GUADALUPE at [facility] and informed squad here for transport, NPO for OR.      Lj Dash RN  04/27/22 2118

## 2022-04-28 NOTE — ANESTHESIA POSTPROCEDURE EVALUATION
Department of Anesthesiology  Postprocedure Note    Patient: Link Melton  MRN: [id number]  YOB: 1940  Date of evaluation: 4/27/2022  Time:  11:40 PM     Procedure Summary     Date: 04/27/22 Room / Location: [address] [place] [address] [address] [place] [address] / [facility]    Anesthesia Start: 2230 Anesthesia Stop: 2332    Procedures:       LEFT SHOULDER CLOSED REDUCTION POSSIBLE  OPEN REDUCTION INTERNAL FIXATION (Left )      LEFT ELBOW  INCISION AND DRAINAGE (Left ) Diagnosis: (LEFT SHOULDER FRACTURE DISLOCATION, LEFT ELBOW LACERATION)    Surgeons: Valentin Freeman MD Responsible Provider: Joe Pantoja MD    Anesthesia Type: general ASA Status: 3          Anesthesia Type: general    Marimar Phase I: Marimar Score: 9    Marimar Phase II:      Last vitals: Reviewed and per EMR flowsheets.        Anesthesia Post Evaluation    Patient location during evaluation: PACU  Patient participation: complete - patient participated  Level of consciousness: awake and alert  Airway patency: patent  Nausea & Vomiting: no nausea and no vomiting  Complications: no  Cardiovascular status: hemodynamically stable  Respiratory status: acceptable  Hydration status: euvolemic  Multimodal analgesia pain management approach

## 2022-04-28 NOTE — PROGRESS NOTES
PACU Transfer Note    Pt meets criteria as per Bhargav Score and ASPAN Standards to transfer to next phase of care. No results for input(s): POCGLU in the last 72 hours. Vitals:    04/27/22 2345   BP: (!) 174/81   Pulse: 52   Resp: 13   Temp: 97 °F (36.1 °C)   SpO2: 96%     BP within   20% of pt's admitting BP as per BHARGAV SCORE    SpO2: 96 %           Intake/Output Summary (Last 24 hours) at 4/27/2022 2353  Last data filed at 4/27/2022 2332  Gross per 24 hour   Intake 600 ml   Output --   Net 600 ml         Handoff report given at bedside.    Family updated and directed to pt room      4/27/2022 11:53 PM

## 2022-04-28 NOTE — H&P
Hospital Medicine History & Physical      PCP:  Santosri    Date of Admission: 4/27/2022    Date of Service: Pt seen/examined on 4/27/2022 and Admitted to Inpatient with expected LOS greater than two midnights due to medical therapy. Chief Complaint: Left shoulder pain      History Of Present Illness:      78 y.o. female who presents from [address] [place] [address] ED for orthopedic consultation. Patient has presented to ED via ambulance after fall. She has been at the [employer] and when she turned to walk away, lost her balance has fallen landing onto her left shoulder. Patient denies striking her head or loss of consciousness. She was not dizzy or lightheaded prior to the fall. Patient was able to get up with assistance and walk. Patient denies chest, neck or abdominal pain, shortness of breath. Denies hip, knee or ankle pain bilaterally. Patient lives by herself and very independent in her ADLs and does not use any assistive devices to ambulate. Her only complaint is left shoulder and elbow pain. Patient has sustained a deep laceration to her left elbow, and left humerus comminuted fracture with anterior dislocation of the shoulder    Attempt to reduce the anterior dislocation was unsuccessful in ED. According to the ED physician left elbow deep laceration was irrigated and loosely covered per orthopedic recommendations. Patient will be getting CT of the left shoulder. Patient states that she has been falling a lot in the past and knows about chronic bilateral rib fractures and compression fractures of the spine. Past Medical History:          Diagnosis Date    Arthritis     Hypertension     Thyroid disease        Past Surgical History:      No past surgical history on file. Medications Prior to Admission:      Prior to Admission medications    Medication Sig Start Date End Date Taking?  Authorizing Provider   metoprolol succinate (TOPROL XL) 100 MG extended release tablet  3/15/22   Historical Provider, MD   amLODIPine (NORVASC) 5 MG tablet  2/23/22   Historical Provider, MD   rosuvastatin (CRESTOR) 40 MG tablet  4/11/22   Historical Provider, MD   aspirin 81 MG chewable tablet Take 81 mg by mouth daily  Patient not taking: Reported on 4/27/2022    Historical Provider, MD   lisinopril (PRINIVIL;ZESTRIL) 40 MG tablet Take 40 mg by mouth daily    Historical Provider, MD   levothyroxine (SYNTHROID) 75 MCG tablet Take 88 mcg by mouth Daily     Historical Provider, MD       Allergies:  Latex and Penicillins    Social History:      TOBACCO:   reports that she has never smoked. She does not have any smokeless tobacco history on file. ETOH:   reports current alcohol use. E-Cigarettes/Vaping Use     Questions Responses    E-Cigarette/Vaping Use     Start Date     Passive Exposure     Quit Date     Counseling Given     Comments         Family History:    Reviewed in detail and negative for DM, CAD, Cancer, CVA. REVIEW OF SYSTEMS COMPLETED:   Pertinent positives as noted in the HPI. All other systems reviewed and negative. PHYSICAL EXAM PERFORMED:    BP (!) 168/74   Pulse 76   Temp 98.2 °F (36.8 °C) (Oral)   Resp 16   Ht 5' 3\" (1.6 m)   Wt 108 lb (49 kg)   SpO2 98%   BMI 19.13 kg/m²     General appearance:  No apparent distress, appears stated age and cooperative. Elderly frail female  HEENT:  Normal cephalic, atraumatic without obvious deformity. Pupils equal, round, and reactive to light. Extra ocular muscles intact. Conjunctivae/corneas clear. Neck: Supple, with full range of motion. No jugular venous distention. Trachea midline. Respiratory:  Normal respiratory effort. Clear to auscultation, bilaterally without Rales/Wheezes/Rhonchi. Cardiovascular:  Regular rate and rhythm with normal S1/S2 without murmurs, rubs or gallops. Abdomen: Soft, non-tender, non-distended with normal bowel sounds. Musculoskeletal:  No clubbing, cyanosis or edema bilaterally.   Left arm in a sling and left elbow deep laceration covered. Peripheral pulses on the left 3+  Skin: Skin color, texture, turgor normal.  No rashes or lesions. Neurologic:  Neurovascularly intact without any focal sensory/motor deficits. Cranial nerves: II-XII intact, grossly non-focal.  Psychiatric:  Alert and oriented, thought content appropriate, normal insight  Capillary Refill: Brisk,3 seconds, normal  Peripheral Pulses: +2 palpable, equal bilaterally       Labs:     Recent Labs     04/27/22 1835   WBC 19.0*   HGB 11.7*   HCT 34.6*        Recent Labs     04/27/22 1835      K 4.1      CO2 26   BUN 18   CREATININE 0.9   CALCIUM 9.3     Recent Labs     04/27/22 1835   AST 43*   ALT 36   BILITOT 0.5   ALKPHOS 32*     No results for input(s): INR in the last 72 hours. No results for input(s): Shellia Bugler in the last 72 hours. Urinalysis:      Lab Results   Component Value Date    NITRU Negative 04/27/2022    WBCUA 21-50 04/27/2022    BACTERIA 1+ 04/27/2022    RBCUA 3-4 04/27/2022    BLOODU TRACE 04/27/2022    SPECGRAV 1.020 04/27/2022    GLUCOSEU Negative 04/27/2022       Radiology:   EKG:  I have reviewed the EKG with the following interpretation: Sinus rhythm, VR = 76, normal intervals, nonspecific ST-T changes, no evidence of acute ischemia    XR HIP 2-3 VW W PELVIS LEFT   Final Result      1. No acute fracture identified in left hip.   2.  Old left pubic bone fracture. XR CHEST 1 VIEW   Final Result      1. No acute cardiopulmonary findings. 2.  Multiple chronic right rib fractures. 3.  Multiple left rib fractures difficult to age but also may be chronic. 4.  Increased displacement across the left proximal humerus fracture. Persistent anterior dislocation of the left shoulder. 5.  Age-indeterminate T9 and T12 vertebral compression fractures. Correlate with point tenderness. CT SHOULDER LEFT WO CONTRAST   Final Result      1. Severely comminuted report to the left proximal humerus fracture. 2.  Anterior glenohumeral dislocation. The humeral head is engaged along the anterior glenoid in a large and deep deep Hill-Sachs fracture. 3.  Anterior glenoid rim displaced Bankart type fracture   4. Anterior coracoid tip displaced fracture. 5.  Large pineal joint lipohemarthrosis. 6.  Hematoma within the subacromial-subdeltoid bursa. 7.  Hematoma within the right axilla. XR SHOULDER LEFT (MIN 2 VIEWS)   Final Result      1. Anterior dislocation of the left shoulder. 2. Comminuted oblique/transverse fracture of the left humeral head/neck. XR WRIST LEFT (MIN 3 VIEWS)   Final Result      No acute bony pathology identified. Significant degenerative changes as above      XR ELBOW LEFT (2 VIEWS)   Final Result   See above      XR HUMERUS LEFT (MIN 2 VIEWS)   Final Result   See above          ASSESSMENT:    Active Hospital Problems    Diagnosis Date Noted    Left supracondylar humerus fracture, closed, initial encounter [facility] [place] 04/27/2022     Priority: Medium    Humerus head fracture, left, with delayed healing, subsequent encounter German Gutierrez 04/27/2022     Priority: Medium   #Mechanical fall leading to unsteady gait with sustained injury to left shoulder  #Left humerus comminuted fracture with anterior dislocation-attempt to reduce the shoulder was unsuccessful in ED  #Left elbow deep laceration, irrigated in ED and recommended to close the wound loosely  #Leukocytosis-unclear etiology. Patient remains afebrile. No respiratory or urinary symptoms. Will get UA  #Chronic anemia-stable at baseline values  #Essential hypertension-on multiple antihypertensives  #Hypothyroid  #Hyperlipidemia      PLAN:  -Wound care per orthopedic recommendations  -CT of the left shoulder as above  -Pain relief as ordered  -UA consistent with urinary tract infection, patient has received cefazolin at 8 PM.  Will continue ceftriaxone every 24 hours starting tomorrow.   Follow urine cultures  -Preop antibiotics per orthopedic surgery recommendations  -Patient will be n.p.o. for surgery tonight  -Postop labs in a.m.  -IV antihypertensives as needed until p.o. intake is initiated  -Will continue her home medications appropriately  -Supportive therapy      RCRI score for this patient is 1%. Patient is low to intermediate risk for perioperative cardiopulmonary complications. Patient understands the risks and benefits well. Agreeable to proceed with surgery    DVT Prophylaxis: SCDs  Diet: Diet NPO  Code Status: Full Code    PT/OT Eval Status: As tolerated    Dispo -GMF with telemetry       Jitendra Mejía MD    Thank you Neela Harding for the opportunity to be involved in this patient's care. If you have any questions or concerns please feel free to contact me at [phone].

## 2022-04-28 NOTE — PROGRESS NOTES
I had the pleasure of taking care of Ms. Jean. Bedside report was performed and I introduced myself before updating the white board and performing an initial assessment of the patient and obtaining vitals. Explained the purpose of the white board, today's date, and how to call out for assistance. Patient is comfortable possessions and position adjusted for ease of use. Vitals for today's shift were as follows. .. Vitals:    04/27/22 2345 04/28/22 0006 04/28/22 0350 04/28/22 0634   BP: (!) 174/81 (!) 161/72 (!) 146/69 135/64   Pulse: 52 73 69 71   Resp: 13 16 18 19   Temp: 97 °F (36.1 °C) 98 °F (36.7 °C) 97.8 °F (36.6 °C) 98 °F (36.7 °C)   TempSrc:  Oral Oral Oral   SpO2: 96% 94% 96% 98%   Weight:       Height:            LDAs have been examined and maintained including IVs flushed and patent. Additional concerns from this shift that were addressed include   - Dr. Francisco Tinsley notified of pts sepsis score via perfect serve at 0900 am.   - Dr. Francisco Tinsley came to see patient stating to continue to observe. Dr. Maco Horne called and asked fo cardiac clearance for potential NPO status at 12 and surgery tomorrow am if cleared from cardio.

## 2022-04-28 NOTE — PROGRESS NOTES
S/p LEFT SHOULDER CLOSED REDUCTION POSSIBLE  OPEN REDUCTION INTERNAL FIXATION  LEFT ELBOW  INCISION AND DRAINAGE. Admitted to PACU bed 13 from OR. Arrived at 2332 . Attached to PACU monitoring system. Alarms and parameters set. Report received from anesthesia personnel. No complication reported intraoperatively. Pt on room air. Athrombic wraps in place. VSS; will continue to monitor.

## 2022-04-28 NOTE — PROGRESS NOTES
Occupational Therapy  Facility/Department: [facility] 5T ORTHO/NEURO  Occupational Therapy Initial Assessment and Treatment Note     Name: Honey Roberts  : 1940  MRN: [id number]  Date of Service: 2022    Discharge Recommendations:Dulce Jones scored a 19/24 on the AM-PAC ADL Inpatient form. Current research shows that an AM-PAC score of 18 or greater is typically associated with a discharge to the patient's home setting. Based on the patient's AM-PAC score, and their current ADL deficits, it is recommended that the patient have 2-3 sessions per week of Occupational Therapy at d/c to increase the patient's independence. At this time, this patient demonstrates the endurance and safety to discharge home with Myla Funk  and a follow up treatment frequency of 2-3x/wk. Please see assessment section for further patient specific details. If patient discharges prior to next session this note will serve as a discharge summary. Please see below for the latest assessment towards goals. Home with nursing aide,Home with Home health OT  OT Equipment Recommendations  Equipment Needed: No     Treatment Diagnosis: impairedf ADLs/ functional mobility 2/2 Fall and L humeral fx      Assessment   Performance deficits / Impairments: Decreased functional mobility ; Decreased ADL status  Assessment: Pt from home alone. Pt very independent. Pt demo functioning below baseline level with LUE dressing / sling management and ADLs. Pt edu on modified tech and adaptations  with LUE in sling-- verb understanding. Pt would benefit from increased assist / Sherie Middleton at dc to maximize functional level. Will follow as inpt if not d/c'ed  Treatment Diagnosis: impaired ADLs/ functional mobility 2/2 Fall and L humeral fx  Prognosis: Good  Decision Making: Medium Complexity  REQUIRES OT FOLLOW-UP: Yes  Activity Tolerance  Activity Tolerance: Patient Tolerated treatment well  Activity Tolerance: No HERNANDEZ noted.  No pain with mobility attempts        Plan   Plan  Times per Week: 2-5x  Times per Day: Daily  Current Treatment Recommendations: Equipment evaluation, education, & procurement,Patient/Caregiver education & training,Safety education & training,Functional mobility training,Self-Care / ADL     Restrictions  Position Activity Restriction  Other position/activity restrictions: NWB L UE; L sling in place    Subjective   General  Chart Reviewed: Yes  Additional Pertinent Hx: Admit from [facility] ED s/p fall + L shoulder dislocation, fx and elbow abrasion  to OR 4/27 s/p  L shoulder closed reduction and Elbow I&D                                   PMHX:OA, HTN and thyroid dx - per MD notes pt reports lots of falls with chronic bilateral rib fractures and compression fractures of the spine. Family / Caregiver Present: No  Diagnosis: L shoulder dislocation/ fx  dur to fall s/p ORIF 4/27  Subjective  Subjective: \" I feel really good\" pt found sitting up in bed - agreeable for OOB/OT eval and tx. Pt reports she is hopeful for d/c home     Social/Functional History  Social/Functional History  Lives With: Alone  Type of Home: Apartment  Home Layout: One level  Home Access: Elevator  Bathroom Shower/Tub: Walk-in shower  Bathroom Toilet: Standard  Bathroom Equipment: Grab bars in shower  Home Equipment: [address] (recliner)  Has the patient had two or more falls in the past year or any fall with injury in the past year?: Yes  ADL Assistance: [address]: Independent (cleaning assist 1x a month- wash linens)  Ambulation Assistance: Independent  Transfer Assistance: Independent  Active : Yes  Occupation: Retired (real estate)  Additional Comments: Pt has no family in town. Pt reports friends/neighbors are her support system. Objective  Treatment included functional transfer training, ADL's and pt. education.        Safety Devices  Type of Devices: Left in chair;Chair alarm in place;Call light within reach;Nurse notified     Toilet Transfers  Toilet - Technique: Ambulating  Equipment Used: Standard toilet  Toilet Transfer: Supervision  Toilet Transfers Comments: with seat for leverage  Shower Transfers  Shower Transfers Comments: Pt edu on having assist with showering / sling management -- verb understanding  AROM: Within functional limits (LUE not tested a shoulder / elbow -- wrist / hand intact)  Strength: Within functional limits  Coordination: Within functional limits  Tone: Normal  Sensation: Intact  ADL  Feeding: Setup; Increased time to complete  Grooming: Setup; Increased time to complete  UE Dressing: Moderate assistance  LE Dressing: Contact guard assistance;Stand by assistance  Toileting: Stand by assistance  Additional Comments: Pt edu on modified One hand tech -- pt verb / demo understanding        Bed mobility  Supine to Sit: Supervision (HOB raised)  Transfers  Sit to stand: Stand by assistance  Stand to sit: Stand by assistance  Transfer Comments: pt edu on hand placement  Vision - Basic Assessment  Prior Vision: Wears glasses only for reading  Cognition  Overall Cognitive Status: [facility]     Education Given To: Patient  Education Provided: Role of Therapy;Plan of Care;IADL Safety; Fall Prevention Strategies; ADL Adaptive Strategies;Transfer Training  Education Method: Verbal;Demonstration  Barriers to Learning: None  Education Outcome: Verbalized understanding;Continued education needed    AM-PAC Score  AM-PAC Inpatient Daily Activity Raw Score: 19 (04/28/22 0958)  AM-PAC Inpatient ADL T-Scale Score : 40.22 (04/28/22 0958)  ADL Inpatient CMS 0-100% Score: 42.8 (04/28/22 0958)  ADL Inpatient CMS G-Code Modifier : CK (04/28/22 0958)    Goals  Short Term Goals  Time Frame for Short term goals: at d/c  Short Term Goal 1: Stance x 8 mins with supervision for ADLs/ IADLs  Short Term Goal 2: Pt/ caregiver demo Don/ doff sling with supervision  Short Term Goal 3: LE Dressing with setup /modified tech  Patient Goals   Patient goals : Go home and take care of myself     Therapy Time   Individual Concurrent Group Co-treatment   Time In 0814         Time Out 0908         Minutes 54              Timed Code Treatment Minutes:   39 mins     Total Treatment Minutes:  54 mins       Mary Pacheco OT

## 2022-04-29 ENCOUNTER — ANESTHESIA (OUTPATIENT)
Dept: OPERATING ROOM | Age: 82
DRG: 464 | End: 2022-04-29
Payer: MEDICARE

## 2022-04-29 ENCOUNTER — ANESTHESIA EVENT (OUTPATIENT)
Dept: OPERATING ROOM | Age: 82
DRG: 464 | End: 2022-04-29
Payer: MEDICARE

## 2022-04-29 ENCOUNTER — APPOINTMENT (OUTPATIENT)
Dept: GENERAL RADIOLOGY | Age: 82
DRG: 464 | End: 2022-04-29
Payer: MEDICARE

## 2022-04-29 VITALS
SYSTOLIC BLOOD PRESSURE: 99 MMHG | RESPIRATION RATE: 8 BRPM | TEMPERATURE: 97 F | DIASTOLIC BLOOD PRESSURE: 53 MMHG | OXYGEN SATURATION: 100 %

## 2022-04-29 PROCEDURE — 2709999900 HC NON-CHARGEABLE SUPPLY: Performed by: ORTHOPAEDIC SURGERY

## 2022-04-29 PROCEDURE — C1776 JOINT DEVICE (IMPLANTABLE): HCPCS | Performed by: ORTHOPAEDIC SURGERY

## 2022-04-29 PROCEDURE — 6360000002 HC RX W HCPCS: Performed by: ANESTHESIOLOGY

## 2022-04-29 PROCEDURE — 2500000003 HC RX 250 WO HCPCS: Performed by: NURSE ANESTHETIST, CERTIFIED REGISTERED

## 2022-04-29 PROCEDURE — 6370000000 HC RX 637 (ALT 250 FOR IP): Performed by: SURGERY

## 2022-04-29 PROCEDURE — 6360000002 HC RX W HCPCS: Performed by: ORTHOPAEDIC SURGERY

## 2022-04-29 PROCEDURE — 7100000000 HC PACU RECOVERY - FIRST 15 MIN: Performed by: ORTHOPAEDIC SURGERY

## 2022-04-29 PROCEDURE — 3209999900 FLUORO FOR SURGICAL PROCEDURES

## 2022-04-29 PROCEDURE — 2580000003 HC RX 258: Performed by: ORTHOPAEDIC SURGERY

## 2022-04-29 PROCEDURE — 64415 NJX AA&/STRD BRCH PLXS IMG: CPT | Performed by: ANESTHESIOLOGY

## 2022-04-29 PROCEDURE — A4217 STERILE WATER/SALINE, 500 ML: HCPCS | Performed by: ORTHOPAEDIC SURGERY

## 2022-04-29 PROCEDURE — 3600000004 HC SURGERY LEVEL 4 BASE: Performed by: ORTHOPAEDIC SURGERY

## 2022-04-29 PROCEDURE — 3600000014 HC SURGERY LEVEL 4 ADDTL 15MIN: Performed by: ORTHOPAEDIC SURGERY

## 2022-04-29 PROCEDURE — 3700000001 HC ADD 15 MINUTES (ANESTHESIA): Performed by: ORTHOPAEDIC SURGERY

## 2022-04-29 PROCEDURE — 1200000000 HC SEMI PRIVATE

## 2022-04-29 PROCEDURE — 73060 X-RAY EXAM OF HUMERUS: CPT

## 2022-04-29 PROCEDURE — 73020 X-RAY EXAM OF SHOULDER: CPT

## 2022-04-29 PROCEDURE — 0RRK00Z REPLACEMENT OF LEFT SHOULDER JOINT WITH REVERSE BALL AND SOCKET SYNTHETIC SUBSTITUTE, OPEN APPROACH: ICD-10-PCS | Performed by: ORTHOPAEDIC SURGERY

## 2022-04-29 PROCEDURE — C9290 INJ, BUPIVACAINE LIPOSOME: HCPCS | Performed by: ANESTHESIOLOGY

## 2022-04-29 PROCEDURE — 7100000001 HC PACU RECOVERY - ADDTL 15 MIN: Performed by: ORTHOPAEDIC SURGERY

## 2022-04-29 PROCEDURE — 3700000000 HC ANESTHESIA ATTENDED CARE: Performed by: ORTHOPAEDIC SURGERY

## 2022-04-29 PROCEDURE — 2500000003 HC RX 250 WO HCPCS: Performed by: ANESTHESIOLOGY

## 2022-04-29 PROCEDURE — 6360000002 HC RX W HCPCS: Performed by: NURSE ANESTHETIST, CERTIFIED REGISTERED

## 2022-04-29 DEVICE — SCREW, LOCKING BONE, RSP, 5X30
Type: IMPLANTABLE DEVICE | Site: SHOULDER | Status: FUNCTIONAL
Brand: DJO SURGICAL

## 2022-04-29 DEVICE — SCREW, LOCKING BONE, RSP, 5X18
Type: IMPLANTABLE DEVICE | Site: SHOULDER | Status: FUNCTIONAL
Brand: DJO SURGICAL

## 2022-04-29 DEVICE — SCREW, LOCKING BONE, RSP, 5X22
Type: IMPLANTABLE DEVICE | Site: SHOULDER | Status: FUNCTIONAL
Brand: DJO SURGICAL

## 2022-04-29 DEVICE — AR, SMALL SOCKET INSERT, 32MM NEUTRAL EPLUS
Type: IMPLANTABLE DEVICE | Site: SHOULDER | Status: FUNCTIONAL
Brand: DJO SURGICAL

## 2022-04-29 DEVICE — GLENOID, HEAD W/RETAINING SCREW, RSP, 32MM/-4MM
Type: IMPLANTABLE DEVICE | Site: SHOULDER | Status: FUNCTIONAL
Brand: DJO SURGICAL

## 2022-04-29 DEVICE — IMPLANTABLE DEVICE: Type: IMPLANTABLE DEVICE | Site: SHOULDER | Status: FUNCTIONAL

## 2022-04-29 DEVICE — RSP BASEPLATE, 30MM, W/P2 COATING
Type: IMPLANTABLE DEVICE | Site: SHOULDER | Status: FUNCTIONAL
Brand: DJO SURGICAL

## 2022-04-29 DEVICE — IMPL CAPPED SHOULDER S3 TOTAL ADV REVERSE DJO: Type: IMPLANTABLE DEVICE | Site: SHOULDER | Status: FUNCTIONAL

## 2022-04-29 RX ORDER — LIDOCAINE HYDROCHLORIDE 20 MG/ML
INJECTION, SOLUTION INFILTRATION; PERINEURAL PRN
Status: DISCONTINUED | OUTPATIENT
Start: 2022-04-29 | End: 2022-04-29 | Stop reason: SDUPTHER

## 2022-04-29 RX ORDER — ROCURONIUM BROMIDE 10 MG/ML
INJECTION, SOLUTION INTRAVENOUS PRN
Status: DISCONTINUED | OUTPATIENT
Start: 2022-04-29 | End: 2022-04-29 | Stop reason: SDUPTHER

## 2022-04-29 RX ORDER — DEXAMETHASONE SODIUM PHOSPHATE 4 MG/ML
INJECTION, SOLUTION INTRA-ARTICULAR; INTRALESIONAL; INTRAMUSCULAR; INTRAVENOUS; SOFT TISSUE PRN
Status: DISCONTINUED | OUTPATIENT
Start: 2022-04-29 | End: 2022-04-29 | Stop reason: SDUPTHER

## 2022-04-29 RX ORDER — BUPIVACAINE HYDROCHLORIDE 2.5 MG/ML
INJECTION, SOLUTION EPIDURAL; INFILTRATION; INTRACAUDAL PRN
Status: DISCONTINUED | OUTPATIENT
Start: 2022-04-29 | End: 2022-04-29 | Stop reason: SDUPTHER

## 2022-04-29 RX ORDER — SODIUM CHLORIDE 9 MG/ML
INJECTION, SOLUTION INTRAVENOUS PRN
Status: DISCONTINUED | OUTPATIENT
Start: 2022-04-29 | End: 2022-05-03 | Stop reason: HOSPADM

## 2022-04-29 RX ORDER — CEFAZOLIN SODIUM 2 G/50ML
SOLUTION INTRAVENOUS PRN
Status: DISCONTINUED | OUTPATIENT
Start: 2022-04-29 | End: 2022-04-29

## 2022-04-29 RX ORDER — FENTANYL CITRATE 50 UG/ML
INJECTION, SOLUTION INTRAMUSCULAR; INTRAVENOUS
Status: COMPLETED
Start: 2022-04-29 | End: 2022-04-29

## 2022-04-29 RX ORDER — ONDANSETRON 2 MG/ML
INJECTION INTRAMUSCULAR; INTRAVENOUS PRN
Status: DISCONTINUED | OUTPATIENT
Start: 2022-04-29 | End: 2022-04-29 | Stop reason: SDUPTHER

## 2022-04-29 RX ORDER — VANCOMYCIN HYDROCHLORIDE 1 G/20ML
INJECTION, POWDER, LYOPHILIZED, FOR SOLUTION INTRAVENOUS PRN
Status: DISCONTINUED | OUTPATIENT
Start: 2022-04-29 | End: 2022-04-29 | Stop reason: HOSPADM

## 2022-04-29 RX ORDER — SODIUM CHLORIDE 0.9 % (FLUSH) 0.9 %
5-40 SYRINGE (ML) INJECTION EVERY 12 HOURS SCHEDULED
Status: DISCONTINUED | OUTPATIENT
Start: 2022-04-29 | End: 2022-05-03 | Stop reason: HOSPADM

## 2022-04-29 RX ORDER — SODIUM CHLORIDE 9 MG/ML
25 INJECTION, SOLUTION INTRAVENOUS PRN
Status: DISCONTINUED | OUTPATIENT
Start: 2022-04-29 | End: 2022-04-29 | Stop reason: HOSPADM

## 2022-04-29 RX ORDER — HYDRALAZINE HYDROCHLORIDE 20 MG/ML
10 INJECTION INTRAMUSCULAR; INTRAVENOUS
Status: DISCONTINUED | OUTPATIENT
Start: 2022-04-29 | End: 2022-04-29 | Stop reason: HOSPADM

## 2022-04-29 RX ORDER — FENTANYL CITRATE 50 UG/ML
50 INJECTION, SOLUTION INTRAMUSCULAR; INTRAVENOUS ONCE
Status: COMPLETED | OUTPATIENT
Start: 2022-04-29 | End: 2022-04-29

## 2022-04-29 RX ORDER — GLYCOPYRROLATE 1 MG/5 ML
SYRINGE (ML) INTRAVENOUS PRN
Status: DISCONTINUED | OUTPATIENT
Start: 2022-04-29 | End: 2022-04-29 | Stop reason: SDUPTHER

## 2022-04-29 RX ORDER — CEFAZOLIN SODIUM 1 G/3ML
INJECTION, POWDER, FOR SOLUTION INTRAMUSCULAR; INTRAVENOUS PRN
Status: DISCONTINUED | OUTPATIENT
Start: 2022-04-29 | End: 2022-04-29

## 2022-04-29 RX ORDER — SODIUM CHLORIDE 0.9 % (FLUSH) 0.9 %
5-40 SYRINGE (ML) INJECTION PRN
Status: DISCONTINUED | OUTPATIENT
Start: 2022-04-29 | End: 2022-04-29 | Stop reason: HOSPADM

## 2022-04-29 RX ORDER — PROPOFOL 10 MG/ML
INJECTION, EMULSION INTRAVENOUS PRN
Status: DISCONTINUED | OUTPATIENT
Start: 2022-04-29 | End: 2022-04-29 | Stop reason: SDUPTHER

## 2022-04-29 RX ORDER — ACETAMINOPHEN 325 MG/1
650 TABLET ORAL EVERY 6 HOURS
Status: DISCONTINUED | OUTPATIENT
Start: 2022-04-29 | End: 2022-05-03 | Stop reason: HOSPADM

## 2022-04-29 RX ORDER — SODIUM CHLORIDE 0.9 % (FLUSH) 0.9 %
5-40 SYRINGE (ML) INJECTION EVERY 12 HOURS SCHEDULED
Status: DISCONTINUED | OUTPATIENT
Start: 2022-04-29 | End: 2022-04-29 | Stop reason: HOSPADM

## 2022-04-29 RX ORDER — MIDAZOLAM HYDROCHLORIDE 1 MG/ML
INJECTION INTRAMUSCULAR; INTRAVENOUS PRN
Status: DISCONTINUED | OUTPATIENT
Start: 2022-04-29 | End: 2022-04-29

## 2022-04-29 RX ORDER — SODIUM CHLORIDE 0.9 % (FLUSH) 0.9 %
5-40 SYRINGE (ML) INJECTION PRN
Status: DISCONTINUED | OUTPATIENT
Start: 2022-04-29 | End: 2022-05-03 | Stop reason: HOSPADM

## 2022-04-29 RX ORDER — PHENYLEPHRINE HYDROCHLORIDE 10 MG/ML
INJECTION INTRAVENOUS PRN
Status: DISCONTINUED | OUTPATIENT
Start: 2022-04-29 | End: 2022-04-29 | Stop reason: SDUPTHER

## 2022-04-29 RX ORDER — SUCCINYLCHOLINE/SOD CL,ISO/PF 200MG/10ML
SYRINGE (ML) INTRAVENOUS PRN
Status: DISCONTINUED | OUTPATIENT
Start: 2022-04-29 | End: 2022-04-29 | Stop reason: SDUPTHER

## 2022-04-29 RX ORDER — MAGNESIUM HYDROXIDE 1200 MG/15ML
LIQUID ORAL CONTINUOUS PRN
Status: DISCONTINUED | OUTPATIENT
Start: 2022-04-29 | End: 2022-04-29 | Stop reason: HOSPADM

## 2022-04-29 RX ORDER — FENTANYL CITRATE 50 UG/ML
INJECTION, SOLUTION INTRAMUSCULAR; INTRAVENOUS PRN
Status: DISCONTINUED | OUTPATIENT
Start: 2022-04-29 | End: 2022-04-29 | Stop reason: SDUPTHER

## 2022-04-29 RX ORDER — CEFAZOLIN SODIUM 1 G/3ML
INJECTION, POWDER, FOR SOLUTION INTRAMUSCULAR; INTRAVENOUS PRN
Status: DISCONTINUED | OUTPATIENT
Start: 2022-04-29 | End: 2022-04-29 | Stop reason: SDUPTHER

## 2022-04-29 RX ADMIN — SODIUM CHLORIDE: 9 INJECTION, SOLUTION INTRAVENOUS at 10:53

## 2022-04-29 RX ADMIN — BUPIVACAINE HYDROCHLORIDE 20 ML: 2.5 INJECTION, SOLUTION EPIDURAL; INFILTRATION; INTRACAUDAL; PERINEURAL at 10:47

## 2022-04-29 RX ADMIN — SODIUM CHLORIDE, PRESERVATIVE FREE 10 ML: 5 INJECTION INTRAVENOUS at 20:17

## 2022-04-29 RX ADMIN — Medication 100 MG: at 11:03

## 2022-04-29 RX ADMIN — FENTANYL CITRATE 100 MCG: 50 INJECTION, SOLUTION INTRAMUSCULAR; INTRAVENOUS at 10:47

## 2022-04-29 RX ADMIN — SODIUM CHLORIDE: 9 INJECTION, SOLUTION INTRAVENOUS at 12:52

## 2022-04-29 RX ADMIN — BUPIVACAINE 10 ML: 13.3 INJECTION, SUSPENSION, LIPOSOMAL INFILTRATION at 10:47

## 2022-04-29 RX ADMIN — PHENYLEPHRINE HYDROCHLORIDE 100 MCG: 10 INJECTION INTRAVENOUS at 11:35

## 2022-04-29 RX ADMIN — Medication 0.2 MG: at 11:00

## 2022-04-29 RX ADMIN — CEFAZOLIN SODIUM 2000 MG: 1 POWDER, FOR SOLUTION INTRAMUSCULAR; INTRAVENOUS at 11:35

## 2022-04-29 RX ADMIN — PHENYLEPHRINE HYDROCHLORIDE 100 MCG: 10 INJECTION INTRAVENOUS at 11:50

## 2022-04-29 RX ADMIN — PHENYLEPHRINE HYDROCHLORIDE 100 MCG: 10 INJECTION INTRAVENOUS at 11:26

## 2022-04-29 RX ADMIN — PHENYLEPHRINE HYDROCHLORIDE 100 MCG: 10 INJECTION INTRAVENOUS at 12:54

## 2022-04-29 RX ADMIN — FENTANYL CITRATE 50 MCG: 50 INJECTION, SOLUTION INTRAMUSCULAR; INTRAVENOUS at 13:23

## 2022-04-29 RX ADMIN — DEXAMETHASONE SODIUM PHOSPHATE 8 MG: 4 INJECTION, SOLUTION INTRAMUSCULAR; INTRAVENOUS at 11:24

## 2022-04-29 RX ADMIN — PHENYLEPHRINE HYDROCHLORIDE 100 MCG: 10 INJECTION INTRAVENOUS at 11:55

## 2022-04-29 RX ADMIN — VANCOMYCIN HYDROCHLORIDE 750 MG: 10 INJECTION, POWDER, LYOPHILIZED, FOR SOLUTION INTRAVENOUS at 11:45

## 2022-04-29 RX ADMIN — PHENYLEPHRINE HYDROCHLORIDE 100 MCG: 10 INJECTION INTRAVENOUS at 11:43

## 2022-04-29 RX ADMIN — PHENYLEPHRINE HYDROCHLORIDE 100 MCG: 10 INJECTION INTRAVENOUS at 11:16

## 2022-04-29 RX ADMIN — ACETAMINOPHEN 650 MG: 325 TABLET ORAL at 20:12

## 2022-04-29 RX ADMIN — ROCURONIUM BROMIDE 45 MG: 10 INJECTION INTRAVENOUS at 11:08

## 2022-04-29 RX ADMIN — ROCURONIUM BROMIDE 5 MG: 10 INJECTION INTRAVENOUS at 11:02

## 2022-04-29 RX ADMIN — PHENYLEPHRINE HYDROCHLORIDE 100 MCG: 10 INJECTION INTRAVENOUS at 12:19

## 2022-04-29 RX ADMIN — Medication 0.1 MG: at 12:34

## 2022-04-29 RX ADMIN — CEFTRIAXONE 1000 MG: 1 INJECTION, POWDER, FOR SOLUTION INTRAMUSCULAR; INTRAVENOUS at 20:12

## 2022-04-29 RX ADMIN — PROPOFOL 150 MG: 10 INJECTION, EMULSION INTRAVENOUS at 11:02

## 2022-04-29 RX ADMIN — PHENYLEPHRINE HYDROCHLORIDE 100 MCG: 10 INJECTION INTRAVENOUS at 11:11

## 2022-04-29 RX ADMIN — PHENYLEPHRINE HYDROCHLORIDE 100 MCG: 10 INJECTION INTRAVENOUS at 11:22

## 2022-04-29 RX ADMIN — FENTANYL CITRATE 50 MCG: 50 INJECTION, SOLUTION INTRAMUSCULAR; INTRAVENOUS at 10:42

## 2022-04-29 RX ADMIN — SUGAMMADEX 100 MG: 100 INJECTION, SOLUTION INTRAVENOUS at 13:33

## 2022-04-29 RX ADMIN — PHENYLEPHRINE HYDROCHLORIDE 200 MCG: 10 INJECTION INTRAVENOUS at 11:39

## 2022-04-29 RX ADMIN — ONDANSETRON 4 MG: 2 INJECTION INTRAMUSCULAR; INTRAVENOUS at 11:24

## 2022-04-29 RX ADMIN — SODIUM CHLORIDE, PRESERVATIVE FREE 10 ML: 5 INJECTION INTRAVENOUS at 20:13

## 2022-04-29 RX ADMIN — PHENYLEPHRINE HYDROCHLORIDE 100 MCG: 10 INJECTION INTRAVENOUS at 12:00

## 2022-04-29 RX ADMIN — PHENYLEPHRINE HYDROCHLORIDE 100 MCG: 10 INJECTION INTRAVENOUS at 12:13

## 2022-04-29 RX ADMIN — LIDOCAINE HYDROCHLORIDE 50 MG: 20 INJECTION, SOLUTION INFILTRATION; PERINEURAL at 11:00

## 2022-04-29 RX ADMIN — PHENYLEPHRINE HYDROCHLORIDE 100 MCG: 10 INJECTION INTRAVENOUS at 12:27

## 2022-04-29 ASSESSMENT — PULMONARY FUNCTION TESTS
PIF_VALUE: 23
PIF_VALUE: 23
PIF_VALUE: 24
PIF_VALUE: 21
PIF_VALUE: 4
PIF_VALUE: 23
PIF_VALUE: 24
PIF_VALUE: 22
PIF_VALUE: 22
PIF_VALUE: 20
PIF_VALUE: 15
PIF_VALUE: 18
PIF_VALUE: 1
PIF_VALUE: 22
PIF_VALUE: 22
PIF_VALUE: 23
PIF_VALUE: 3
PIF_VALUE: 4
PIF_VALUE: 21
PIF_VALUE: 22
PIF_VALUE: 22
PIF_VALUE: 15
PIF_VALUE: 23
PIF_VALUE: 1
PIF_VALUE: 15
PIF_VALUE: 23
PIF_VALUE: 22
PIF_VALUE: 1
PIF_VALUE: 21
PIF_VALUE: 19
PIF_VALUE: 21
PIF_VALUE: 22
PIF_VALUE: 21
PIF_VALUE: 22
PIF_VALUE: 23
PIF_VALUE: 1
PIF_VALUE: 22
PIF_VALUE: 15
PIF_VALUE: 23
PIF_VALUE: 1
PIF_VALUE: 22
PIF_VALUE: 22
PIF_VALUE: 21
PIF_VALUE: 22
PIF_VALUE: 24
PIF_VALUE: 23
PIF_VALUE: 1
PIF_VALUE: 22
PIF_VALUE: 25
PIF_VALUE: 22
PIF_VALUE: 16
PIF_VALUE: 22
PIF_VALUE: 21
PIF_VALUE: 22
PIF_VALUE: 23
PIF_VALUE: 23
PIF_VALUE: 22
PIF_VALUE: 1
PIF_VALUE: 22
PIF_VALUE: 21
PIF_VALUE: 15
PIF_VALUE: 22
PIF_VALUE: 23
PIF_VALUE: 22
PIF_VALUE: 21
PIF_VALUE: 24
PIF_VALUE: 22
PIF_VALUE: 22
PIF_VALUE: 21
PIF_VALUE: 22
PIF_VALUE: 22
PIF_VALUE: 1
PIF_VALUE: 22
PIF_VALUE: 23
PIF_VALUE: 21
PIF_VALUE: 23
PIF_VALUE: 2
PIF_VALUE: 21
PIF_VALUE: 25
PIF_VALUE: 25
PIF_VALUE: 22
PIF_VALUE: 24
PIF_VALUE: 21
PIF_VALUE: 25
PIF_VALUE: 19
PIF_VALUE: 21
PIF_VALUE: 23
PIF_VALUE: 23
PIF_VALUE: 22
PIF_VALUE: 15
PIF_VALUE: 2
PIF_VALUE: 21
PIF_VALUE: 21
PIF_VALUE: 15
PIF_VALUE: 19
PIF_VALUE: 15
PIF_VALUE: 22
PIF_VALUE: 21
PIF_VALUE: 22
PIF_VALUE: 24
PIF_VALUE: 22
PIF_VALUE: 1
PIF_VALUE: 24
PIF_VALUE: 23
PIF_VALUE: 21
PIF_VALUE: 25
PIF_VALUE: 24
PIF_VALUE: 19
PIF_VALUE: 15
PIF_VALUE: 20
PIF_VALUE: 23
PIF_VALUE: 19
PIF_VALUE: 24
PIF_VALUE: 22
PIF_VALUE: 25
PIF_VALUE: 24
PIF_VALUE: 23
PIF_VALUE: 20
PIF_VALUE: 23
PIF_VALUE: 22
PIF_VALUE: 2
PIF_VALUE: 22
PIF_VALUE: 5
PIF_VALUE: 2
PIF_VALUE: 15
PIF_VALUE: 24
PIF_VALUE: 24
PIF_VALUE: 23
PIF_VALUE: 21
PIF_VALUE: 24
PIF_VALUE: 22
PIF_VALUE: 25
PIF_VALUE: 23
PIF_VALUE: 23
PIF_VALUE: 18
PIF_VALUE: 22
PIF_VALUE: 1
PIF_VALUE: 5
PIF_VALUE: 23
PIF_VALUE: 15
PIF_VALUE: 21
PIF_VALUE: 24
PIF_VALUE: 6
PIF_VALUE: 5
PIF_VALUE: 24
PIF_VALUE: 20
PIF_VALUE: 0
PIF_VALUE: 22
PIF_VALUE: 21
PIF_VALUE: 19
PIF_VALUE: 22
PIF_VALUE: 15
PIF_VALUE: 22
PIF_VALUE: 22
PIF_VALUE: 4
PIF_VALUE: 21
PIF_VALUE: 23
PIF_VALUE: 20
PIF_VALUE: 15
PIF_VALUE: 19
PIF_VALUE: 17
PIF_VALUE: 22
PIF_VALUE: 22
PIF_VALUE: 21
PIF_VALUE: 23

## 2022-04-29 ASSESSMENT — PAIN SCALES - GENERAL
PAINLEVEL_OUTOF10: 0
PAINLEVEL_OUTOF10: 3
PAINLEVEL_OUTOF10: 0
PAINLEVEL_OUTOF10: 0
PAINLEVEL_OUTOF10: 4
PAINLEVEL_OUTOF10: 0

## 2022-04-29 ASSESSMENT — ENCOUNTER SYMPTOMS
GASTROINTESTINAL NEGATIVE: 1
RESPIRATORY NEGATIVE: 1
EYES NEGATIVE: 1

## 2022-04-29 ASSESSMENT — PAIN DESCRIPTION - DESCRIPTORS: DESCRIPTORS: DISCOMFORT

## 2022-04-29 ASSESSMENT — PAIN DESCRIPTION - PAIN TYPE: TYPE: SURGICAL PAIN

## 2022-04-29 ASSESSMENT — PAIN DESCRIPTION - ONSET: ONSET: ON-GOING

## 2022-04-29 ASSESSMENT — PAIN DESCRIPTION - LOCATION: LOCATION: SHOULDER

## 2022-04-29 ASSESSMENT — PAIN DESCRIPTION - ORIENTATION: ORIENTATION: LEFT

## 2022-04-29 ASSESSMENT — PAIN DESCRIPTION - FREQUENCY: FREQUENCY: INTERMITTENT

## 2022-04-29 NOTE — OP NOTE
[address], [address]                                OPERATIVE REPORT    PATIENT NAME: Jarvis Holder            :        1940  MED REC NO:   [id number]                          ROOM:       5527  ACCOUNT NO:   [de-identified]                           ADMIT DATE: 2022  PROVIDER:     Kumar Ghosh MD    DATE OF PROCEDURE:  2022    PRIMARY CARE PROVIDER:  Santos Shin MD    SURGEON:  Kumar Ghosh MD    ASSISTANTS:  Shaun Shrestha    PREOPERATIVE DIAGNOSES:  1. Left shoulder four-part comminuted fracture dislocation. 2.  Penetrating deep wound, left elbow. POSTOPERATIVE DIAGNOSES:  1. Left shoulder four-part comminuted fracture-dislocation. 2.  Penetrating deep wound, left elbow. PROCEDURES DONE:  1.  Closed treatment of shoulder dislocation with manipulation requiring  anesthesia. 2.  Exploration of penetrating wound, left elbow, with coagulation of  bleeding small subcutaneous vessels, and removal of hematoma with  excisional debridement of skin, subcutaneous tissue and fascia. ANESTHESIA:  General anesthesia. ESTIMATED BLOOD LOSS:  Minimal.    COMPLICATIONS:  None. INDICATIONS:  This is an 70-year-old white female, right hand dominant,  who is still fairly active for her age, who is still independent, lives  alone, sustained a fall after she turned around after using [employer] machine  and sustained a fall onto her left shoulder. She was brought to [facility]  ER at [place] where she was found to have a fracture-dislocation of the  left shoulder and a large deep laceration, possibly in the joint of the  left elbow. I was consulted for her injury.   Because of her age and  being alone with the fracture-dislocation, she was admitted to [facility]. and because of the dislocation, as well as an open wound  possible in the joint, we elected to proceed with urgent surgical  treatment. All risks, benefits, and alternatives were discussed with  the patient. The patient agreed to proceed with the surgical treatment. DESCRIPTION OF PROCEDURE:  The patient's left arm was marked. She  received 2 gm Ancef IV preoperatively. She was then brought to the  operating room, underwent general anesthesia. First, we tried the  closed reduction for the left shoulder fracture-dislocation. It was  found to be a four-part fracture. Closed reduction was performed, and  we were unable to disengage the head out of the glenoid even though the  patient is thin and the head of the humerus can be palpable as well as  engaged into the glenoid. It was obvious that the fracture is very  unstable. At this point, we aborted the closed reduction after we had  multiple manipulations with no success. We then turned our attention  towards the elbow wound with exploration of the wound. At this point,  we have the left upper extremity prepped and draped in a sterile routine  fashion. A time-out was called, confirming the patient name, site, and  procedure again for the elbow. We removed all the stitches that were  placed in the ER. At this point, we explored the wound, it was found to  be deep into the forearm. There was a large hematoma that was also  evacuated. We then irrigated the wound copiously with normal saline  mixed with gentamicin. We did not see violation of this joint  clinically. We then used a cautery to coagulate small bleeding vessels. At this point, we performed an excisional debridement of the skin,  subcutaneous tissue, and fascia. We then used 3-0 nylon, we were able  to close the skin with minimal tension. Dressing was then applied in  the form of Adaptic, 4x4, Kerlix, and Ace wrap. Sling was then applied. The patient tolerated the procedure well and was taken to the recovery  in stable condition.     POSTOPERATIVE PLAN:  The patient will be readmitted as an inpatient. She will continue on IV antibiotic for an open wound. She will need an  open treatment for her shoulder with reverse shoulder arthroplasty given  that it is a four-part fracture-dislocation. We will contact Dr. Janice Hernandez,  shoulder specialist for that management.         Jodi Perea MD    D: 04/28/2022 18:56:55       T: 04/28/2022 23:28:20     /[email or web address]  Job#: [id number]     Doc#: [id number]    CC:

## 2022-04-29 NOTE — PROGRESS NOTES
PACU Transfer Note    Vitals:    04/29/22 1430   BP: (!) 143/68   Pulse: 77   Resp: 14   Temp: 96.4 °F (35.8 °C)   SpO2: 100%       In: 1500 [I.V.:1500]  Out: 260     Pain assessment:  Pain Level: 0    Report given to Receiving unit RN.    4/29/2022 3:01 PM

## 2022-04-29 NOTE — PROGRESS NOTES
Progress Note    Admit Date: 4/27/2022  Day: 2    Diet: Diet NPO    CC:Fall    Interval history:   Patient seen and examined at the PACU before surgery   Patient went for total shoulder replacement. No concerns expressed by the patient   Review of Systems   Constitutional: Negative. HENT: Negative. Eyes: Negative. Respiratory: Negative. Cardiovascular: Negative. Gastrointestinal: Negative. Endocrine: Negative. Musculoskeletal: Positive for joint swelling. Skin: Negative. Neurological: Negative. Hematological: Negative. Psychiatric/Behavioral: Negative. Medications:     Scheduled Meds:   sodium chloride flush  5-40 mL IntraVENous 2 times per day    lidocaine-EPINEPHrine  20 mL IntraDERmal Once    levothyroxine  88 mcg Oral Daily    lisinopril  40 mg Oral Daily    metoprolol succinate  50 mg Oral Daily    sodium chloride flush  5-40 mL IntraVENous 2 times per day    cefTRIAXone (ROCEPHIN) IV  1,000 mg IntraVENous Q24H     Continuous Infusions:   sodium chloride 75 mL/hr at 04/28/22 0023    sodium chloride      sodium chloride Stopped (04/27/22 2131)    sodium chloride       PRN Meds:sodium chloride flush, sodium chloride, sodium chloride flush, sodium chloride, ondansetron **OR** ondansetron, polyethylene glycol, acetaminophen **OR** acetaminophen, morphine, traMADol    Objective:   Vitals:   T-max:  Patient Vitals for the past 8 hrs:   BP Temp Temp src Pulse Resp SpO2   04/29/22 0617 (!) 145/74 97.7 °F (36.5 °C) Oral 71 16 97 %   04/29/22 0355 135/79 98.6 °F (37 °C) Oral 68 18 97 %     No intake or output data in the 24 hours ending 04/29/22 0819    Physical Exam  Vitals and nursing note reviewed. HENT:      Head: Normocephalic. Cardiovascular:      Rate and Rhythm: Normal rate and regular rhythm. Pulses: Normal pulses. Heart sounds: Normal heart sounds. Pulmonary:      Effort: Pulmonary effort is normal.      Breath sounds: Normal breath sounds. Abdominal:      General: Bowel sounds are normal.   Neurological:      General: No focal deficit present. Mental Status: She is oriented to person, place, and time. Psychiatric:         Mood and Affect: Mood normal.         Behavior: Behavior normal.           LABS:    CBC:   Recent Labs     04/27/22 1835 04/28/22  0632   WBC 19.0* 12.0*   HGB 11.7* 11.1*   HCT 34.6* 32.5*    148   MCV 90.9 91.4     Renal:    Recent Labs     04/27/22 1835 04/28/22  0632    132*   K 4.1 4.1    96*   CO2 26 27   BUN 18 15   CREATININE 0.9 0.7   GLUCOSE 114* 165*   CALCIUM 9.3 8.6   ANIONGAP 12 9     Hepatic:   Recent Labs     04/27/22 1835   AST 43*   ALT 36   BILITOT 0.5   PROT 7.0   LABALBU 4.4   ALKPHOS 32*     Troponin: No results for input(s): TROPONINI in the last 72 hours. BNP: No results for input(s): BNP in the last 72 hours. Lipids: No results for input(s): CHOL, HDL in the last 72 hours. Invalid input(s): LDLCALCU, TRIGLYCERIDE  ABGs:  No results for input(s): PHART, SMC5OSI, PO2ART, OWF6CAD, BEART, THGBART, L0FWHFCA, OOH5GMG in the last 72 hours. INR: No results for input(s): INR in the last 72 hours. Lactate: No results for input(s): LACTATE in the last 72 hours. Cultures:  -----------------------------------------------------------------  RAD:   CT SHOULDER LEFT WO CONTRAST       Indication: Left shoulder fracture dislocation.       Comparison: Radiograph same day       Technique:  Contiguous axial CT of the left shoulder was acquired. The axial data set was reformatted in the coronal and sagittal planes. Up-to-date CT equipment and radiation dose reduction techniques were employed.       Findings:        Severely comminuted fracture in the proximal humerus with involvement of the surgical neck, anatomic neck, and the greater and lesser tuberosities. There is significant impaction/shortening across the surgical neck.  There is significant displacement of    the greater tuberosity fragment. This comprises a three-part fracture. The humeral head is anteriorly dislocated. The humeral head is engaged on the anterior glenoid within a deep Hill-Sachs impaction fracture.       There is a displaced fracture in the anterior rim of the glenoid. There is a displaced fracture from the anterior tip of the coracoid process.       There is a large glenohumeral joint lipohemarthrosis. There is hemorrhagic fluid within the subacromial-subdeltoid bursa. There is hematoma throughout the right axilla. EXAM: PELVIS AND LEFT HIP 3 VIEWS       INDICATION: Fall. Left hip pain.       COMPARISON: None       FINDINGS:       The patient's hand is overlying the abdomen which obscures the sacrum and left iliac wing.       No acute fracture or malalignment identified in the left hip. There is an old fracture in the left pubic bone. PORTABLE CHEST       Indication: Fall. Chest pain.       Comparison: None.       Findings:       Patient is rotated. Heart size is within normal limits. No consolidation, pleural effusion or pneumothorax identified. There are multiple right rib fractures which are chronic in appearance. There are also a few left rib fractures which are difficult to    age but also may be chronic.         Comminuted left proximal humerus fracture. Increased displacement. Persistent anterior glenohumeral dislocation.       T9 vertebral compression fracture. T12 vertebral compression fracture. These are age indeterminate         Assessment/Plan:   78 y. o. female with PMhx of HTN, paroxysmal atrial fibrillation, hypothyroidism,  anxiety, TIA in April 2021 who presents from [place] [facility] for orthopedic consultation. Femi Simon has presented to ED via ambulance after fall.  She has been at the [facility] and when she turned to walk away, lost her balance has fallen landing onto her left shoulder on 4/27     # Falls   - Hx of Multiple falls prior  admission had complete work up done as an outpatient.    - B 12 was ordered   -Hx of TIA back in April 2021   - Currently on a statin      # Left shoulder fracture with dislocation  - After fall seen on X-ray and CT Of the left shoulder which revealed  Severely comminuted three-part left proximal humerus fracture, Anterior glenohumeral dislocation,   - Orthopedic surgery recommended total shoulder replacement.      #  HTN   -Continue home medication     # Hypothyroidism   - Continue levothyroxine      # HLD   -On rosuvastatin      # Paroxysmal atrial fibrillation   - She is not on any Anticoagulation and sees cardiology   - Her Cardiologist  recommended a watchman catheter to her.  -Continue Metoprolol XL     Code Status: Full  FEN: Regular diet   PPX: N/A  DISPO: 5-S, 5110     Fely Verdin MD, PGY-2  04/29/22  8:19 AM    This patient has been staffed and discussed with Lyn Gasca MD.

## 2022-04-29 NOTE — PROGRESS NOTES
Occupational Therapy/ Physical Therapy   D/c Note     Pt currently off the floor in OR for LEFT SHOULDER REVERSE TOTAL SHOULDER WITH REPAIR OF TUBEROSITIES. Seen 4/28 for evaluations. Will sign off from OT /PT services and await new orders per Dr Senait Montenegro s/p repair. Rn aware.     Silke Xie  MS, OTR/L #[id number]   [facility], [address] [place] [address]

## 2022-04-29 NOTE — ANESTHESIA PROCEDURE NOTES
Peripheral Block    Patient location during procedure: pre-op  Start time: 4/29/2022 10:35 AM  End time: 4/29/2022 10:40 AM  Staffing  Performed: anesthesiologist   Anesthesiologist: Alejandra Mendoza DO  Preanesthetic Checklist  Completed: patient identified, IV checked, site marked, risks and benefits discussed, surgical consent, monitors and equipment checked, pre-op evaluation, timeout performed, anesthesia consent given, oxygen available and patient being monitored  Peripheral Block  Patient position: supine  Prep: ChloraPrep  Patient monitoring: cardiac monitor, continuous pulse ox, continuous capnometry, frequent blood pressure checks, IV access, oxygen and responsive to questions  Block type: Brachial plexus  Laterality: left  Injection technique: single-shot  Guidance: ultrasound guided  Interscalene  Provider prep: mask  Needle  Needle type: insulated echogenic nerve stimulator needle   Needle gauge: 20 G  Needle length: 5 cm  Needle localization: ultrasound guidance  Assessment  Injection assessment: negative aspiration for heme, no paresthesia on injection, local visualized surrounding nerve on ultrasound and no intravascular symptoms  Paresthesia pain: none  Slow fractionated injection: yes  Hemodynamics: stableOutcomes: uncomplicated  Additional Notes  0.25% 20mL bupi with 10mL exparel  Reason for block: post-op pain management and at surgeon's request

## 2022-04-29 NOTE — ANESTHESIA POSTPROCEDURE EVALUATION
Department of Anesthesiology  Postprocedure Note    Patient: Tereso Sever  MRN: [id number]  YOB: 1940  Date of evaluation: 4/29/2022  Time:  3:57 PM     Procedure Summary     Date: 04/29/22 Room / Location: [address] [place] [address] [address]    Anesthesia Start: 1053 Anesthesia Stop: 7331    Procedure: OPEN REDUCTION LEFT SHOULDER FRACTURE DISLOCATION, REVERSE TOTAL SHOULDER WITH REPAIR OF TUBEROSITIES,OPEN BICEPS TENODESIS (Left Shoulder) Diagnosis:       Closed supracondylar fracture of left humerus, initial encounter      (Closed supracondylar fracture of left humerus, initial encounter [T84.275I])    Surgeons: Darrin Spence MD Responsible Provider: Jeremy Mejía DO    Anesthesia Type: general, regional ASA Status: 3          Anesthesia Type: general, regional    Marimar Phase I: Marimar Score: 9    Marimar Phase II:      Last vitals: Reviewed and per EMR flowsheets.        Anesthesia Post Evaluation    Patient location during evaluation: PACU  Patient participation: complete - patient participated  Level of consciousness: awake and alert  Pain score: 0  Airway patency: patent  Nausea & Vomiting: no nausea and no vomiting  Complications: no  Cardiovascular status: hemodynamically stable  Respiratory status: acceptable  Hydration status: stable

## 2022-04-29 NOTE — ANESTHESIA POSTPROCEDURE EVALUATION
Department of Anesthesiology  Postprocedure Note    Patient: Abelardo Christopher  MRN: [id number]  YOB: 1940  Date of evaluation: 4/29/2022  Time:  2:10 PM     Procedure Summary     Date: 04/29/22 Room / Location: [facility] [place] [address] [place] [address] / [facility]    Anesthesia Start: 1053 Anesthesia Stop: 2421    Procedure: OPEN REDUCTION LEFT SHOULDER FRACTURE DISLOCATION, REVERSE TOTAL SHOULDER WITH REPAIR OF TUBEROSITIES,OPEN BICEPS TENODESIS (Left Shoulder) Diagnosis:       Closed supracondylar fracture of left humerus, initial encounter      (Closed supracondylar fracture of left humerus, initial encounter [H88.247M])    Surgeons: Margaret Whaley MD Responsible Provider: Kaci Esqueda DO    Anesthesia Type: general, regional ASA Status: 3          Anesthesia Type: general, regional    Marimar Phase I: Marimar Score: 4    Marimar Phase II:      Last vitals: Reviewed and per EMR flowsheets.        Anesthesia Post Evaluation    Patient location during evaluation: PACU  Patient participation: complete - patient participated  Level of consciousness: awake  Pain score: 0  Airway patency: patent  Nausea & Vomiting: no nausea and no vomiting  Complications: no  Cardiovascular status: blood pressure returned to baseline  Respiratory status: acceptable  Hydration status: euvolemic  Multimodal analgesia pain management approach

## 2022-04-29 NOTE — PROGRESS NOTES
Left arm- time out completed with Dr. Javier Rose and Dr. Fallon Pretty. Exparel injected for nerve block for pain control. Patient tolerated well. VS stable. ID, Allergy and Exparel arm bands in place. Patient denies any concerns for surgery.

## 2022-04-29 NOTE — PROGRESS NOTES
Patient back in room from the OR. A/O x4. Vital signs stable. Endorsing decreased sensation to LUE and denies pain. +2 left radial pulse and strong left sided finger . LUE remains in immobilizer with ice bag in place. Tolerating liquids. Denies n/v. Fall precautions in place. Bed locked in lowest position with alarm on. Call light and belongings within reach. Will continue to monitor.

## 2022-04-29 NOTE — PROGRESS NOTES
Consent form signed by patient and this RN for a left reverse total shoulder with Dr. Hay Collier later today. Form placed in chart.

## 2022-04-29 NOTE — CONSULTS
[facility]  Cardiology Inpatient Consult Service                                                                                          Pt Name: Lee Locke  Age: 78 y.o. Sex: female  : 1940  Location: [facility]/2369-    Referring Physician: Alexa Burrell MD      Reason for Consult:       Reason for Consultation/Chief Complaint: Cardiac clearance for orthopedic surgery      HPI:      Lee Locke is a 78 y.o. female never smoker with a past medical history of acquired hypothyroidism, HTN, osteoporosis, scoliosis  who presented after a fall on her left shoulder. Imaging revealed left humerus comminuted fracture with anterior dislocation of the shoulder. Trial of closed reduction was unsuccessful. Orhopedic surgery recommended reverse total shoulder replacement and we were consulted for cardiac clearance. Pt has no known cardiac history. Is able to walk up a flight of stairs without difficulty. No dizziness/LH, syncope, palpitations, nausea, vomiting, chest pain, SOB, abdominal pain or leg swelling. EKG NSR without acute ischemic changes. 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 Histories     Past Medical History:   has a past medical history of Arthritis, Hypertension, and Thyroid disease. Surgical History:   has a past surgical history that includes shoulder surgery (Left, 2022) and Arm Surgery (Left, 2022). Social History:   reports that she has never smoked. She does not have any smokeless tobacco history on file. She reports current alcohol use. She reports that she does not use drugs. Family History:  No evidence for sudden cardiac death or premature CAD      Medications:       Home Medications  Were reviewed and are listed in nursing record.  and/or listed below  Prior to Admission medications    Medication Sig Start Date End Date Taking? Authorizing Provider   metoprolol succinate (TOPROL XL) 100 MG extended release tablet  3/15/22   Historical Provider, MD   amLODIPine (NORVASC) 5 MG tablet  2/23/22   Historical Provider, MD   rosuvastatin (CRESTOR) 40 MG tablet  4/11/22   Historical Provider, MD   aspirin 81 MG chewable tablet Take 81 mg by mouth daily  Patient not taking: Reported on 4/27/2022    Historical Provider, MD   lisinopril (PRINIVIL;ZESTRIL) 40 MG tablet Take 40 mg by mouth daily    Historical Provider, MD   levothyroxine (SYNTHROID) 75 MCG tablet Take 88 mcg by mouth Daily     Historical Provider, MD          Inpatient Medications:   sodium chloride flush  5-40 mL IntraVENous 2 times per day    lidocaine-EPINEPHrine  20 mL IntraDERmal Once    levothyroxine  88 mcg Oral Daily    lisinopril  40 mg Oral Daily    metoprolol succinate  50 mg Oral Daily    sodium chloride flush  5-40 mL IntraVENous 2 times per day    cefTRIAXone (ROCEPHIN) IV  1,000 mg IntraVENous Q24H       IV drips:   sodium chloride 75 mL/hr at 04/28/22 0023    sodium chloride      sodium chloride Stopped (04/27/22 2131)    sodium chloride         PRN:  sodium chloride flush, sodium chloride, sodium chloride flush, sodium chloride, ondansetron **OR** ondansetron, polyethylene glycol, acetaminophen **OR** acetaminophen, morphine, traMADol    Allergy:     Latex and Penicillins       Review of Systems:     All 12 point review of symptoms completed. Pertinent positives identified in the HPI, all other review of symptoms negative as below. CONSTITUTIONAL: Nounanticipated weight loss. No change in energy level, sleep pattern, or activity level. SKIN: No rash or pruritis. EYES: No visual changes or diplopia. No scleral icterus. ENT: No Headaches, hearing loss or vertigo. No mouth sores or sore throat. CARDIOVASCULAR: No chest pain/chest pressure/chest discomfort. No palpitations.    RESPIRATORY: No cough or wheezing, no sputum production. No hematemesis. GASTROINTESTINAL: No N/V/D. No abdominal pain, appetite loss, blood in stools. GENITOURINARY: No dysuria, trouble voiding, or hematuria. MUSCULOSKELETAL:  No gait disturbance, weakness or joint complaints. NEUROLOGICAL: No headache, diplopia, change in muscle strength, numbness or tingling. No change in gait, balance, coordination, mood, affect, memory, mentation, behavior. PSHYCH: No anxiety, loss of interest, change in sexual behavior, feelings of self-harm, or confusion. ENDOCRINE: No malaise, fatigue or temperature intolerance. No excessive thirst, fluid intake, or urination. No tremor. HEMATOLOGIC: No abnormal bruising or bleeding. ALLERGY: No nasal congestion or hives.       Physical Examination:     Vitals:    04/28/22 1820 04/28/22 2305 04/29/22 0355 04/29/22 0617   BP: 119/67 (!) 164/72 135/79 (!) 145/74   Pulse: 95 85 68 71   Resp: 16 18 18 16   Temp: 97.8 °F (36.6 °C) 99 °F (37.2 °C) 98.6 °F (37 °C) 97.7 °F (36.5 °C)   TempSrc: Oral Oral Oral Oral   SpO2: 99% 98% 97% 97%   Weight:       Height:           Wt Readings from Last 3 Encounters:   04/27/22 108 lb (49 kg)   03/18/17 130 lb (59 kg)       Objective      General Appearance:  Alert, cooperative, no distress, appears stated age Appropriate weight   Head:  Normocephalic, without obvious abnormality, atraumatic   Eyes:  PERRL, conjunctiva/corneas clear EOM intact  Ears normal   Throat no lesions       Nose: Nares normal, no drainage or sinus tenderness   Throat: Lips, mucosa, and tongue normal   Neck: Supple, symmetrical, trachea midline, no adenopathy, thyroid: not enlarged, symmetric, no tenderness/mass/nodules, no carotid bruit or JVD       Lungs:   Clear to auscultation bilaterally, respirations unlabored   Chest Wall:  No tenderness or deformity   Heart:  Regular rate and rhythm, S1, S2 normal, no murmur, rub or gallop PMI intact   Abdomen:   Soft, non-tender, bowel sounds active all four quadrants,  no masses, no organomegaly       Extremities: Extremities normal, atraumatic, no cyanosis or edema   Pulses: 2+ and symmetric   Skin: Skin color, texture, turgor normal, no rashes or lesions   Pysch: Normal mood and affect   Neurologic: Normal gross motor and sensory exam.  Cranial nerves intact        Labs:     Recent Labs     04/27/22 1835 04/28/22  0632    132*   K 4.1 4.1   BUN 18 15   CREATININE 0.9 0.7    96*   CO2 26 27   GLUCOSE 114* 165*   CALCIUM 9.3 8.6     Recent Labs     04/27/22 1835 04/27/22 1835 04/28/22  0632   WBC 19.0*  --  12.0*   HGB 11.7*  --  11.1*   HCT 34.6*  --  32.5*     --  148   MCV 90.9   < > 91.4    < > = values in this interval not displayed. No results for input(s): CHOLTOT, TRIG, HDL, LDL in the last 72 hours. Invalid input(s): LIPIDCOMM, CHOLHDL, VLDCHOL  No results for input(s): PTT, INR in the last 72 hours. Invalid input(s): PT  No results for input(s): CKTOTAL, CKMB, CKMBINDEX, TROPONINI in the last 72 hours. No results for input(s): BNP in the last 72 hours. No results for input(s): TSH in the last 72 hours. No results for input(s): CHOL, HDL, LDLCALC, TRIG in the last 72 hours.]    No results found for: CKTOTAL, CKMB, CKMBINDEX, TROPONINI      Imaging:     I personally reviewed imaging studies including CXR, Stress test, TTE/STACY. Last ECG (if available) -personally interpreted EKG:  I have reviewed EKG with the following interpretation  NSR, no ST-T changes. Telemetry: Personally interpreted    Last Stress (if available):    Last TTE/STACY(if available):    Last Cath (if available):    Last CMR  (if available):      Assessment / Plan:     Cardiac clearance for orthopedic surgery  HTN  - No cardiac history, no pulmonary/liver/kidney disease, >4 METS  - Labs non-concerning, EKG NSR without ischemic change  - Okay to proceed with orthopedic surgery (total shoulder replacement)    Will discuss with Dr. Amelia Dozier.     Lima Grigsby (PGY-3), MD

## 2022-04-29 NOTE — PROGRESS NOTES
Patient to PACU #13 for pre op holding. VS stable. No complaints of pain. Left shoulder marked per physician, discussion with patient about the procedure. Surgical consent signed and anesthesia consent signed. No personal belongings on patient.

## 2022-04-29 NOTE — ANESTHESIA PRE PROCEDURE
Department of Anesthesiology  Preprocedure Note       Name:  Robert De Souza   Age:  78 y.o.  :  1940                                          MRN:  [id number]         Date:  2022      Surgeon: Darius Mccall):  Christoph Castro MD    Procedure: Procedure(s):  LEFT SHOULDER REVERSE TOTAL SHOULDER WITH REPAIR OF TUBEROSITIES    Medications prior to admission:   Prior to Admission medications    Medication Sig Start Date End Date Taking? Authorizing Provider   metoprolol succinate (TOPROL XL) 100 MG extended release tablet  3/15/22   Historical Provider, MD   amLODIPine (NORVASC) 5 MG tablet  22   Historical Provider, MD   rosuvastatin (CRESTOR) 40 MG tablet  22   Historical Provider, MD   aspirin 81 MG chewable tablet Take 81 mg by mouth daily  Patient not taking: Reported on 2022    Historical Provider, MD   lisinopril (PRINIVIL;ZESTRIL) 40 MG tablet Take 40 mg by mouth daily    Historical Provider, MD   levothyroxine (SYNTHROID) 75 MCG tablet Take 88 mcg by mouth Daily     Historical Provider, MD       Current medications:    No current facility-administered medications for this visit. No current outpatient medications on file.      Facility-Administered Medications Ordered in Other Visits   Medication Dose Route Frequency Provider Last Rate Last Admin    0.45 % sodium chloride infusion   IntraVENous Continuous Tangela Lee MD 75 mL/hr at 22 0023 New Bag at 22 0023    sodium chloride flush 0.9 % injection 5-40 mL  5-40 mL IntraVENous 2 times per day Tangela Lee MD   10 mL at 22 1947    sodium chloride flush 0.9 % injection 5-40 mL  5-40 mL IntraVENous PRN Tangela Lee MD        0.9 % sodium chloride infusion   IntraVENous PRN Tangela Lee MD        lidocaine-EPINEPHrine 1 %-[id number] injection 20 mL  20 mL IntraDERmal Once Tangela Lee MD        0.9 % sodium chloride infusion  1,000 mL IntraVENous Continuous Tangela Lee MD   Patient Transferred to Other Facility at 04/27/22 2131    levothyroxine (SYNTHROID) tablet 88 mcg  88 mcg Oral Daily Pearl Yeboah MD   88 mcg at 04/28/22 0636    lisinopril (PRINIVIL;ZESTRIL) tablet 40 mg  40 mg Oral Daily Marlene Reddy MD   40 mg at 04/28/22 2307    metoprolol succinate (TOPROL XL) extended release tablet 50 mg  50 mg Oral Daily Marlene Reddy MD   50 mg at 04/28/22 2306    sodium chloride flush 0.9 % injection 5-40 mL  5-40 mL IntraVENous 2 times per day Pearl Yeboah MD   10 mL at 04/28/22 1948    sodium chloride flush 0.9 % injection 5-40 mL  5-40 mL IntraVENous PRN Pearl Yeboah MD        0.9 % sodium chloride infusion   IntraVENous PRN Pearl Yeboah MD        ondansetron (ZOFRAN-ODT) disintegrating tablet 4 mg  4 mg Oral Q8H PRN Pearl Yeboah MD        Or    ondansetron [facility]) injection 4 mg  4 mg IntraVENous Q6H PRN Pearl Yeboah MD        polyethylene glycol (GLYCOLAX) packet 17 g  17 g Oral Daily PRN Pearl Yeboah MD        acetaminophen (TYLENOL) tablet 650 mg  650 mg Oral Q6H PRN Pearl Yeboah MD        Or   [facility] acetaminophen (TYLENOL) suppository 650 mg  650 mg Rectal Q6H PRN Pearl Yeboah MD        morphine (PF) injection 2 mg  2 mg IntraVENous Q3H PRN Pearl Yeboah MD        traMADol Everrett Mouse) tablet 50 mg  50 mg Oral Q6H PRN Pearl Yeboah MD        cefTRIAXone (ROCEPHIN) 1000 mg IVPB in 50 mL D5W minibag  1,000 mg IntraVENous Q24H Pearl Yeboah MD   Stopped at 04/28/22 2053       Allergies:     Allergies   Allergen Reactions    Latex Other (See Comments)    Penicillins        Problem List:    Patient Active Problem List   Diagnosis Code    Left supracondylar humerus fracture, closed, initial encounter S42.412A    Humerus head fracture, left, with delayed healing, subsequent encounter S40.1G    Anterior shoulder dislocation, left, initial encounter S45.0A    Elbow laceration, left, initial encounter G99.667T       Past Medical History:        Diagnosis Date  Arthritis     Hypertension     Thyroid disease        Past Surgical History:        Procedure Laterality Date    ARM SURGERY Left 4/27/2022    LEFT ELBOW  INCISION AND DRAINAGE performed by Yessenia Taylor MD at [address] Left 4/27/2022    LEFT SHOULDER CLOSED REDUCTION POSSIBLE  OPEN REDUCTION INTERNAL FIXATION performed by Yessenia Taylor MD at [address] History:    Social History     Tobacco Use    Smoking status: Never Smoker    Smokeless tobacco: Not on file   Substance Use Topics    Alcohol use: Yes     Comment: social                                Counseling given: Not Answered      Vital Signs (Current): There were no vitals filed for this visit. BP Readings from Last 3 Encounters:   04/29/22 (!) 145/74   04/27/22 (!) 181/91   09/22/18 (!) 143/83       NPO Status:                                                                                 BMI:   Wt Readings from Last 3 Encounters:   04/27/22 108 lb (49 kg)   03/18/17 130 lb (59 kg)     There is no height or weight on file to calculate BMI.    CBC:   Lab Results   Component Value Date    WBC 12.0 04/28/2022    RBC 3.56 04/28/2022    HGB 11.1 04/28/2022    HCT 32.5 04/28/2022    MCV 91.4 04/28/2022    RDW 13.0 04/28/2022     04/28/2022       CMP:   Lab Results   Component Value Date     04/28/2022    K 4.1 04/28/2022    CL 96 04/28/2022    CO2 27 04/28/2022    BUN 15 04/28/2022    CREATININE 0.7 04/28/2022    GFRAA >60 04/28/2022    AGRATIO 1.7 04/27/2022    LABGLOM >60 04/28/2022    GLUCOSE 165 04/28/2022    PROT 7.0 04/27/2022    CALCIUM 8.6 04/28/2022    BILITOT 0.5 04/27/2022    ALKPHOS 32 04/27/2022    AST 43 04/27/2022    ALT 36 04/27/2022       POC Tests: No results for input(s): POCGLU, POCNA, POCK, POCCL, POCBUN, POCHEMO, POCHCT in the last 72 hours.     Coags: No results found for: PROTIME, INR, APTT    HCG (If Applicable): No results found for: PREGTESTUR, PREGSERUM, HCG, HCGQUANT     ABGs: No results found for: PHART, PO2ART, KOE4JTS, JAQ3JOY, BEART, K6MANTVV     Type & Screen (If Applicable):  No results found for: LABABO, LABRH    Drug/Infectious Status (If Applicable):  No results found for: HIV, HEPCAB    COVID-19 Screening (If Applicable): No results found for: COVID19        Anesthesia Evaluation  Patient summary reviewed and Nursing notes reviewed no history of anesthetic complications:   Airway: Mallampati: I  TM distance: >3 FB   Neck ROM: full  Mouth opening: > = 3 FB Dental: normal exam         Pulmonary:Negative Pulmonary ROS and normal exam                               Cardiovascular:  Exercise tolerance: no interval change,   (+) hypertension:,     (-)  angina, orthopnea and PND    ECG reviewed  Rhythm: regular  Rate: normal           Beta Blocker:  Dose within 24 Hrs         Neuro/Psych:   Negative Neuro/Psych ROS              GI/Hepatic/Renal: Neg GI/Hepatic/Renal ROS            Endo/Other:    (+) hypothyroidism::., electrolyte abnormalities (chronic hyponatremia.), . Abdominal:             Vascular: negative vascular ROS. Other Findings:             Anesthesia Plan      general and regional     ASA 3       Induction: intravenous. MIPS: Postoperative opioids intended and Prophylactic antiemetics administered. Anesthetic plan and risks discussed with patient. Use of blood products discussed with patient whom consented to blood products. Plan discussed with CRNA and attending.     Attending anesthesiologist reviewed and agrees with Preprocedure content            Alejandra Mendoza DO   4/29/2022

## 2022-04-30 LAB
ANION GAP SERPL CALCULATED.3IONS-SCNC: 11 MMOL/L (ref 3–16)
BASOPHILS ABSOLUTE: 0 K/UL (ref 0–0.2)
BASOPHILS RELATIVE PERCENT: 0.1 %
BUN BLDV-MCNC: 18 MG/DL (ref 7–20)
CALCIUM SERPL-MCNC: 7.9 MG/DL (ref 8.3–10.6)
CHLORIDE BLD-SCNC: 98 MMOL/L (ref 99–110)
CO2: 22 MMOL/L (ref 21–32)
CREAT SERPL-MCNC: 0.8 MG/DL (ref 0.6–1.2)
EOSINOPHILS ABSOLUTE: 0 K/UL (ref 0–0.6)
EOSINOPHILS RELATIVE PERCENT: 0 %
GFR AFRICAN AMERICAN: >60
GFR NON-AFRICAN AMERICAN: >60
GLUCOSE BLD-MCNC: 108 MG/DL (ref 70–99)
HCT VFR BLD CALC: 29.6 % (ref 36–48)
HEMOGLOBIN: 9.6 G/DL (ref 12–16)
LYMPHOCYTES ABSOLUTE: 1 K/UL (ref 1–5.1)
LYMPHOCYTES RELATIVE PERCENT: 6.3 %
MCH RBC QN AUTO: 30.9 PG (ref 26–34)
MCHC RBC AUTO-ENTMCNC: 32.6 G/DL (ref 31–36)
MCV RBC AUTO: 94.8 FL (ref 80–100)
MONOCYTES ABSOLUTE: 1.8 K/UL (ref 0–1.3)
MONOCYTES RELATIVE PERCENT: 11.6 %
NEUTROPHILS ABSOLUTE: 12.8 K/UL (ref 1.7–7.7)
NEUTROPHILS RELATIVE PERCENT: 82 %
PDW BLD-RTO: 13.5 % (ref 12.4–15.4)
PLATELET # BLD: 153 K/UL (ref 135–450)
PMV BLD AUTO: 8.8 FL (ref 5–10.5)
POTASSIUM SERPL-SCNC: 4 MMOL/L (ref 3.5–5.1)
RBC # BLD: 3.12 M/UL (ref 4–5.2)
SODIUM BLD-SCNC: 131 MMOL/L (ref 136–145)
WBC # BLD: 15.6 K/UL (ref 4–11)

## 2022-04-30 PROCEDURE — 36415 COLL VENOUS BLD VENIPUNCTURE: CPT

## 2022-04-30 PROCEDURE — 97164 PT RE-EVAL EST PLAN CARE: CPT

## 2022-04-30 PROCEDURE — 97530 THERAPEUTIC ACTIVITIES: CPT

## 2022-04-30 PROCEDURE — 6370000000 HC RX 637 (ALT 250 FOR IP): Performed by: INTERNAL MEDICINE

## 2022-04-30 PROCEDURE — 6360000002 HC RX W HCPCS: Performed by: INTERNAL MEDICINE

## 2022-04-30 PROCEDURE — 2580000003 HC RX 258: Performed by: SURGERY

## 2022-04-30 PROCEDURE — 97535 SELF CARE MNGMENT TRAINING: CPT

## 2022-04-30 PROCEDURE — 97168 OT RE-EVAL EST PLAN CARE: CPT

## 2022-04-30 PROCEDURE — 6370000000 HC RX 637 (ALT 250 FOR IP): Performed by: STUDENT IN AN ORGANIZED HEALTH CARE EDUCATION/TRAINING PROGRAM

## 2022-04-30 PROCEDURE — 97116 GAIT TRAINING THERAPY: CPT

## 2022-04-30 PROCEDURE — 2580000003 HC RX 258: Performed by: INTERNAL MEDICINE

## 2022-04-30 PROCEDURE — 80048 BASIC METABOLIC PNL TOTAL CA: CPT

## 2022-04-30 PROCEDURE — 6370000000 HC RX 637 (ALT 250 FOR IP): Performed by: SURGERY

## 2022-04-30 PROCEDURE — 6370000000 HC RX 637 (ALT 250 FOR IP): Performed by: ORTHOPAEDIC SURGERY

## 2022-04-30 PROCEDURE — 85025 COMPLETE CBC W/AUTO DIFF WBC: CPT

## 2022-04-30 PROCEDURE — 1200000000 HC SEMI PRIVATE

## 2022-04-30 RX ORDER — LANOLIN ALCOHOL/MO/W.PET/CERES
6 CREAM (GRAM) TOPICAL NIGHTLY PRN
Status: DISCONTINUED | OUTPATIENT
Start: 2022-04-30 | End: 2022-05-03 | Stop reason: HOSPADM

## 2022-04-30 RX ORDER — METOPROLOL SUCCINATE 100 MG/1
100 TABLET, EXTENDED RELEASE ORAL DAILY
Status: DISCONTINUED | OUTPATIENT
Start: 2022-05-01 | End: 2022-05-03 | Stop reason: HOSPADM

## 2022-04-30 RX ORDER — PANTOPRAZOLE SODIUM 40 MG/1
40 TABLET, DELAYED RELEASE ORAL
Status: DISCONTINUED | OUTPATIENT
Start: 2022-05-01 | End: 2022-04-30

## 2022-04-30 RX ORDER — CALCIUM CARBONATE 200(500)MG
500 TABLET,CHEWABLE ORAL 3 TIMES DAILY PRN
Status: DISCONTINUED | OUTPATIENT
Start: 2022-04-30 | End: 2022-05-03 | Stop reason: HOSPADM

## 2022-04-30 RX ORDER — PANTOPRAZOLE SODIUM 40 MG/1
40 TABLET, DELAYED RELEASE ORAL
Status: DISCONTINUED | OUTPATIENT
Start: 2022-04-30 | End: 2022-05-03 | Stop reason: HOSPADM

## 2022-04-30 RX ORDER — SENNA AND DOCUSATE SODIUM 50; 8.6 MG/1; MG/1
2 TABLET, FILM COATED ORAL DAILY PRN
Status: DISCONTINUED | OUTPATIENT
Start: 2022-04-30 | End: 2022-05-03 | Stop reason: HOSPADM

## 2022-04-30 RX ADMIN — Medication 6 MG: at 22:45

## 2022-04-30 RX ADMIN — LIDOCAINE HYDROCHLORIDE: 20 SOLUTION ORAL; TOPICAL at 11:42

## 2022-04-30 RX ADMIN — METOPROLOL SUCCINATE 50 MG: 50 TABLET, EXTENDED RELEASE ORAL at 08:03

## 2022-04-30 RX ADMIN — SODIUM CHLORIDE, PRESERVATIVE FREE 10 ML: 5 INJECTION INTRAVENOUS at 20:49

## 2022-04-30 RX ADMIN — LISINOPRIL 40 MG: 40 TABLET ORAL at 08:03

## 2022-04-30 RX ADMIN — LEVOTHYROXINE SODIUM 88 MCG: 0.09 TABLET ORAL at 05:35

## 2022-04-30 RX ADMIN — PANTOPRAZOLE SODIUM 40 MG: 40 TABLET, DELAYED RELEASE ORAL at 11:41

## 2022-04-30 RX ADMIN — ANTACID TABLETS 500 MG: 500 TABLET, CHEWABLE ORAL at 22:45

## 2022-04-30 RX ADMIN — CEFTRIAXONE 1000 MG: 1 INJECTION, POWDER, FOR SOLUTION INTRAMUSCULAR; INTRAVENOUS at 20:57

## 2022-04-30 RX ADMIN — ACETAMINOPHEN 650 MG: 325 TABLET ORAL at 20:47

## 2022-04-30 RX ADMIN — ACETAMINOPHEN 650 MG: 325 TABLET ORAL at 08:03

## 2022-04-30 ASSESSMENT — ENCOUNTER SYMPTOMS
ALLERGIC/IMMUNOLOGIC NEGATIVE: 1
EYES NEGATIVE: 1
ABDOMINAL PAIN: 0
RESPIRATORY NEGATIVE: 1
RHINORRHEA: 0
TROUBLE SWALLOWING: 1
FACIAL SWELLING: 0
EYE DISCHARGE: 0
ABDOMINAL DISTENTION: 0
EYE ITCHING: 0

## 2022-04-30 ASSESSMENT — PAIN DESCRIPTION - LOCATION
LOCATION: ELBOW
LOCATION: ELBOW

## 2022-04-30 ASSESSMENT — PAIN DESCRIPTION - ONSET: ONSET: ON-GOING

## 2022-04-30 ASSESSMENT — PAIN DESCRIPTION - FREQUENCY: FREQUENCY: CONTINUOUS

## 2022-04-30 ASSESSMENT — PAIN DESCRIPTION - PAIN TYPE: TYPE: ACUTE PAIN

## 2022-04-30 ASSESSMENT — PAIN SCALES - GENERAL
PAINLEVEL_OUTOF10: 3
PAINLEVEL_OUTOF10: 3
PAINLEVEL_OUTOF10: 0
PAINLEVEL_OUTOF10: 3
PAINLEVEL_OUTOF10: 3
PAINLEVEL_OUTOF10: 5
PAINLEVEL_OUTOF10: 0

## 2022-04-30 ASSESSMENT — PAIN - FUNCTIONAL ASSESSMENT: PAIN_FUNCTIONAL_ASSESSMENT: PREVENTS OR INTERFERES SOME ACTIVE ACTIVITIES AND ADLS

## 2022-04-30 ASSESSMENT — PAIN DESCRIPTION - ORIENTATION
ORIENTATION: LEFT
ORIENTATION: LEFT

## 2022-04-30 ASSESSMENT — PAIN DESCRIPTION - DESCRIPTORS: DESCRIPTORS: ACHING;SORE

## 2022-04-30 NOTE — PROGRESS NOTES
Occupational Therapy  Facility/Department: [facility] 5T ORTHO/NEURO  Occupational Therapy Re-Evaluation    Name: Solitario Waller  : 1940  MRN: [id number]  Date of Service: 2022    Discharge Recommendations:  Solitario Waller scored a 16/24 on the AM-PAC ADL Inpatient form. Current research shows that an AM-PAC score of 18 or greater is typically associated with a discharge to the patient's home setting. Based on the patient's AM-PAC score, and their current ADL deficits, it is recommended that the patient have 2-3 sessions per week of Occupational Therapy at d/c to increase the patient's independence. At this time, this patient demonstrates the endurance and safety to discharge  home with home services and a follow up treatment frequency of 2-3x/wk. Please see assessment section for further patient specific details. If patient discharges prior to next session this note will serve as a discharge summary. Please see below for the latest assessment towards goals. Home with nursing aide,Home with Home health OT  OT Equipment Recommendations  Equipment Needed: No       Patient Diagnosis(es): The primary encounter diagnosis was Anterior shoulder dislocation, left, initial encounter. Diagnoses of Humeral head fracture, left, closed, initial encounter, Elbow laceration, left, initial encounter, Fall, initial encounter, and Leukocytosis, unspecified type were also pertinent to this visit. Past Medical History:  has a past medical history of Arthritis, Hypertension, and Thyroid disease. Past Surgical History:  has a past surgical history that includes shoulder surgery (Left, 2022); Arm Surgery (Left, 2022); and shoulder surgery (Left, 2022). Treatment Diagnosis: Impaired ADL and functional mobility      Assessment   Performance deficits / Impairments: Decreased functional mobility ; Decreased ADL status; Decreased balance;Decreased ROM  Assessment: Pt seen s/p TSR on 4/29/22. Pt req CG-SBA for mobility and up to max assist for ADL. Pt plans to go home and will have assist from family for at least 2 weeks. Cont OT tx per plan of care. Treatment Diagnosis: Impaired ADL and functional mobility  Prognosis: Good  Decision Making: Medium Complexity  REQUIRES OT FOLLOW-UP: Yes  Activity Tolerance  Activity Tolerance: Patient Tolerated treatment well        Plan   Plan  Times per Week: 7  Times per Day: Daily  Current Treatment Recommendations: Equipment evaluation, education, & procurement,Patient/Caregiver education & training,Safety education & training,Functional mobility training,Self-Care / ADL,ROM,Endurance training     Restrictions  Position Activity Restriction  Other position/activity restrictions: NWB L UE; Shoulder immobilizer: May remove for physical therapy, dressing change and hygiene under supervision of a nurse, physician or physical therapy. Subjective   General  Chart Reviewed: Yes  Patient assessed for rehabilitation services?: Yes  Additional Pertinent Hx: Admit from [facility] ED s/p fall + L shoulder dislocation, fx and elbow abrashion  to OR 4/27 s/p  L shoulder closed reduction and Elbow I&D        4/29 openreduction of fracture dislocation with extraction of dislocated humeralhead, open biceps tenodesis, reverse total shoulder replacement withrepair of greater and lesser tuberosity fragments and excision ofglenoid rim fracture fragments. PMHX:OA, HTN and thyroid dx - per MD notes pt reports lots of falls with chronic bilateral rib fractures and compression fractures of the spine. Family / Caregiver Present: No  Diagnosis: L shoulder dislocation/ fx  dur to fall s/p ORIF 4/27  Subjective  Subjective: Pt in bed upon entry. Pt reports she has assist coming in for at least 2 weeks.   Pain: reports pain in shoulder with mobility, not rated, positioned for comfort at end of session  Social/Functional History  Social/Functional History  Lives With: Alone  Type of Home: Apartment  Home Layout: One level  Home Access: Elevator  Bathroom Shower/Tub: Walk-in shower  Bathroom Toilet: Standard  Bathroom Equipment: Grab bars in [address] [place]: [address] [facility] (recliner)  Has the patient had two or more falls in the past year or any fall with injury in the past year?: Yes  ADL Assistance: [address]: Independent (cleaning assist 1x a month- wash linens)  Ambulation Assistance: Independent  Transfer Assistance: Independent  Active : Yes  Occupation: Retired (real estate)  Additional Comments: Pt has no family in town. Pt reports friends/neighbors are her support system. Objective   Hearing: Within functional limits          Safety Devices  Type of Devices: Left in chair;Chair alarm in place;Call light within reach;Nurse notified;Gait belt (meal tray in place)     Toilet Transfers  Toilet - Technique: Ambulating  Equipment Used: Standard toilet  Toilet Transfer: Contact guard assistance  Toilet Transfers Comments: used seat for leverage     ADL  Feeding: Setup  Grooming: Setup;Verbal cueing; Increased time to complete (to wash hands)  UE Dressing: Maximum assistance (for hospital gown and immobilizer)  LE Dressing: Minimal assistance  Toileting: Minimal assistance (assist for clothing management)  Additional Comments: Pt edu on modified One hand tech -- pt verb / demo understanding        Bed mobility  Supine to Sit: Stand by assistance (HOB elevated)  Scooting: Stand by assistance  Transfers  Stand Step Transfers: Contact guard assistance  Sit to stand: Contact guard assistance  Stand to sit: Contact guard assistance     Cognition  Overall Cognitive Status: WFL                  Education Given To: Patient  Education Provided: Role of Therapy;Plan of Care;Home Exercise Program;Precautions; ADL Adaptive Strategies;Transfer Training  Patient educated on shoulder immobilizer; including purpose, donning/doffing, fit/positioning, and wear schedule as ordered by surgical team. Patient verbalized confirming understanding.   Education Method: Verbal;Demonstration  Barriers to Learning: None  Education Outcome: Verbalized understanding;Continued education needed  Left Hand AROM (degrees)  Left Hand AROM: WFL  RUE AROM (degrees)  RUE AROM : WFL        Hand Dominance  Hand Dominance: Right            AM-PAC Score        AM-PAC Inpatient Daily Activity Raw Score: 16 (04/30/22 1326)  AM-PAC Inpatient ADL T-Scale Score : 35.96 (04/30/22 1326)  ADL Inpatient CMS 0-100% Score: 53.32 (04/30/22 1326)  ADL Inpatient CMS G-Code Modifier : CK (04/30/22 1326)    Goals                            No goals met  Short Term Goals  Time Frame for Short term goals: at d/c  Short Term Goal 1: Stance x 8 mins with supervision for ADLs/ IADLs  Short Term Goal 2: Pt/caregiver demo Don/ doff immobilizer with supervision  Short Term Goal 3: LE Dressing with setup /modified tech  Patient Goals   Patient goals : Go home and take care of myself       Therapy Time   Individual Concurrent Group Co-treatment   Time In 1153         Time Out 1232         Minutes 39         Timed Code Treatment Minutes: 34 Minutes    Total Treatment Adrián Ch[address], OTR/L [id number]

## 2022-04-30 NOTE — PROGRESS NOTES
Progress Note    Admit Date: 4/27/2022  Day: 3    Diet: ADULT DIET; Regular    CC:Fall    Interval history:   Patient seen and examined   Na trending down to 131   WBC trending down   Status post left shoulder replacement   UA +VE for leucocyte esterase   Pain on swallowing and acid reflux reported by the patient   No pain at the left humeral surgical site      Review of Systems   Constitutional: Negative for activity change, appetite change, chills, diaphoresis, fatigue, fever and unexpected weight change. HENT: Positive for trouble swallowing. Negative for congestion, dental problem, drooling, ear pain, facial swelling, hearing loss, mouth sores, nosebleeds, postnasal drip, rhinorrhea and sneezing. Eyes: Negative. Negative for discharge and itching. Respiratory: Negative. Cardiovascular: Negative. Gastrointestinal: Negative for abdominal distention and abdominal pain. Endocrine: Negative. Genitourinary: Negative. Musculoskeletal: Negative. Allergic/Immunologic: Negative. Neurological: Negative. Hematological: Negative. Psychiatric/Behavioral: Negative.            Medications:     Scheduled Meds:   cefTRIAXone (ROCEPHIN) IV  1,000 mg IntraVENous Q24H    sodium chloride flush  5-40 mL IntraVENous 2 times per day    acetaminophen  650 mg Oral Q6H    sodium chloride flush  5-40 mL IntraVENous 2 times per day    lidocaine-EPINEPHrine  20 mL IntraDERmal Once    levothyroxine  88 mcg Oral Daily    lisinopril  40 mg Oral Daily    metoprolol succinate  50 mg Oral Daily    sodium chloride flush  5-40 mL IntraVENous 2 times per day     Continuous Infusions:   sodium chloride      sodium chloride 75 mL/hr at 04/28/22 0023    sodium chloride Stopped (04/29/22 1356)    sodium chloride Stopped (04/27/22 2131)    sodium chloride       PRN Meds:sodium chloride flush, sodium chloride, sodium chloride flush, sodium chloride, sodium chloride flush, sodium chloride, ondansetron **OR** ondansetron, polyethylene glycol, acetaminophen **OR** acetaminophen, morphine, traMADol    Objective:   Vitals:   T-max:  Patient Vitals for the past 8 hrs:   BP Temp Temp src Pulse Resp SpO2   04/30/22 0638 (!) 160/74 98.6 °F (37 °C) Oral 84 18 95 %   04/30/22 0240 (!) 149/72 98.3 °F (36.8 °C) Oral 82 16 96 %       Intake/Output Summary (Last 24 hours) at 4/30/2022 0825  Last data filed at 4/29/2022 1758  Gross per 24 hour   Intake 1670 ml   Output 260 ml   Net 1410 ml       Physical Exam  Vitals and nursing note reviewed. HENT:      Head: Normocephalic. Cardiovascular:      Rate and Rhythm: Normal rate and regular rhythm. Pulses: Normal pulses. Heart sounds: Normal heart sounds. Pulmonary:      Effort: Pulmonary effort is normal.      Breath sounds: Normal breath sounds. Abdominal:      General: Bowel sounds are normal.   Neurological:      General: No focal deficit present. Mental Status: She is oriented to person, place, and time. Psychiatric:         Mood and Affect: Mood normal.         Behavior: Behavior normal.     LABS:    CBC:   Recent Labs     04/27/22 1835 04/28/22  0632 04/30/22  0718   WBC 19.0* 12.0* 15.6*   HGB 11.7* 11.1* 9.6*   HCT 34.6* 32.5* 29.6*    148 153   MCV 90.9 91.4 94.8     Renal:    Recent Labs     04/27/22 1835 04/28/22  0632 04/30/22  0718    132* 131*   K 4.1 4.1 4.0    96* 98*   CO2 26 27 22   BUN 18 15 18   CREATININE 0.9 0.7 0.8   GLUCOSE 114* 165* 108*   CALCIUM 9.3 8.6 7.9*   ANIONGAP 12 9 11     Hepatic:   Recent Labs     04/27/22 1835   AST 43*   ALT 36   BILITOT 0.5   PROT 7.0   LABALBU 4.4   ALKPHOS 32*     Troponin: No results for input(s): TROPONINI in the last 72 hours. BNP: No results for input(s): BNP in the last 72 hours. Lipids: No results for input(s): CHOL, HDL in the last 72 hours.     Invalid input(s): LDLCALCU, TRIGLYCERIDE  ABGs:  No results for input(s): PHART, KUZ5GTW, PO2ART, YAP6XTL, BEART, THGBART, H7ELJZSN, CQK4POX in the last 72 hours. INR: No results for input(s): INR in the last 72 hours. Lactate: No results for input(s): LACTATE in the last 72 hours. Cultures:  -----------------------------------------------------------------  RAD:   CT SHOULDER LEFT WO CONTRAST       Indication: Left shoulder fracture dislocation.       Comparison: Radiograph same day       Technique:  Contiguous axial CT of the left shoulder was acquired. The axial data set was reformatted in the coronal and sagittal planes. Up-to-date CT equipment and radiation dose reduction techniques were employed.       Findings:        Severely comminuted fracture in the proximal humerus with involvement of the surgical neck, anatomic neck, and the greater and lesser tuberosities. There is significant impaction/shortening across the surgical neck. There is significant displacement of    the greater tuberosity fragment. This comprises a three-part fracture. The humeral head is anteriorly dislocated. The humeral head is engaged on the anterior glenoid within a deep Hill-Sachs impaction fracture.       There is a displaced fracture in the anterior rim of the glenoid. There is a displaced fracture from the anterior tip of the coracoid process.       There is a large glenohumeral joint lipohemarthrosis. There is hemorrhagic fluid within the subacromial-subdeltoid bursa. There is hematoma throughout the right axilla. EXAM: PELVIS AND LEFT HIP 3 VIEWS       INDICATION: Fall. Left hip pain.       COMPARISON: None       FINDINGS:       The patient's hand is overlying the abdomen which obscures the sacrum and left iliac wing.       No acute fracture or malalignment identified in the left hip. There is an old fracture in the left pubic bone. PORTABLE CHEST       Indication: Fall. Chest pain.       Comparison: None.       Findings:       Patient is rotated. Heart size is within normal limits. No consolidation, pleural effusion or pneumothorax identified. There are multiple right rib fractures which are chronic in appearance. There are also a few left rib fractures which are difficult to    age but also may be chronic.         Comminuted left proximal humerus fracture. Increased displacement. Persistent anterior glenohumeral dislocation.       T9 vertebral compression fracture. T12 vertebral compression fracture. These are age indeterminate         Assessment/Plan:   78 y. o. female with PMhx of HTN, paroxysmal atrial fibrillation, hypothyroidism,  anxiety, TIA in April 2021 who presents from [place] ED for orthopedic consultation. Andres Rogel has presented to ED via ambulance after fall.  She has been at the [facility] and when she turned to walk away, lost her balance has fallen landing onto her left shoulder on 4/27     # Falls   - Hx of Multiple falls prior  admission had complete work up done as an outpatient. - B 12 was ordered   -Hx of TIA back in April 2021   - Currently on a statin      # Left shoulder fracture with dislocation  - After fall seen on X-ray and CT Of the left shoulder which revealed  Severely comminuted three-part left proximal humerus fracture, Anterior glenohumeral dislocation,   - POD # 2 of  total shoulder replacement. # GERD   - Protonix 40 mg Ordered   -GI cocktail given for pain relief    # Hyponatremia  - Secondary to fluid replacement  - Will continue monitor for now     # UTI   -UA positive for leucocyte esterase and 1+ bacteria, 21-50 WBC in urine   - Urine cultures pending  - On Rocephin      #  HTN   -Continue home medication     # Hypothyroidism   - Continue levothyroxine      # HLD   -On rosuvastatin      # Paroxysmal atrial fibrillation   - She is not on any Anticoagulation and sees cardiology   - Her Cardiologist  recommended a watchman catheter to her.   -Metoprolol  mg ordered     Code Status: Full  FEN: Regular diet   PPX: N/A  DISPO: 5-S, 5863     Terrence Crowley MD, PGY-2  04/30/22  8:25 AM    This patient has been staffed and discussed with Clarita Elaine MD.

## 2022-04-30 NOTE — PROGRESS NOTES
Physical Therapy  Facility/Department: [facility]  Physical Therapy Initial Assessment and treatment    Name: Robert De Souza  : 1940  MRN: [id number]  Date of Service: 2022    Discharge Recommendations:    Robert De Souza scored a 18/24 on the AM-PAC short mobility form. Current research shows that an AM-PAC score of 18 or greater is typically associated with a discharge to the patient's home setting. Based on the patient's AM-PAC score and their current functional mobility deficits, it is recommended that the patient have 2-3 sessions per week of Physical Therapy at d/c to increase the patient's independence. At this time, this patient demonstrates the endurance and safety to discharge home with home PT and a follow up treatment frequency of 2-3x/wk. Please see assessment section for further patient specific details. HOME HEALTH CARE: LEVEL 1 STANDARD    - Initial home health evaluation to occur within 24-48 hours, in patient home   - Therapy to evaluate with goal of regaining prior level of functioning   - Therapy to evaluate if patient has [address] needs for personal care      PT Equipment Recommendations  Equipment Needed: No      Patient Diagnosis(es): The primary encounter diagnosis was Anterior shoulder dislocation, left, initial encounter. Diagnoses of Humeral head fracture, left, closed, initial encounter, Elbow laceration, left, initial encounter, Fall, initial encounter, and Leukocytosis, unspecified type were also pertinent to this visit. Past Medical History:  has a past medical history of Arthritis, Hypertension, and Thyroid disease. Past Surgical History:  has a past surgical history that includes shoulder surgery (Left, 2022); Arm Surgery (Left, 2022); and shoulder surgery (Left, 2022).     Assessment   Body Structures, Functions, Activity Limitations Requiring Skilled Therapeutic Intervention: Decreased functional mobility ;Decreased endurance;Decreased balance;Decreased safe awareness; Increased pain  Assessment: Patient re-evaluated s/p left TSA on 4/29. She was able to transfer and ambulate in room and hallways with use of straight cane and CGA for safety demonstrating overall steady gait. Patient needs assistance to remove/don shoulder immobilizer and frequent cues throughout session to avoid using LUE. Patient reports plan to d/c home and has assistance from family for at least 2 weeks. Recommend continued skilled PT upon discharge. Will follow while in acute care setting to maximize independence with mobility. Treatment Diagnosis: impaired mobility associated with L TSA  Therapy Prognosis: Good  Requires PT Follow-Up: Yes  Activity Tolerance  Activity Tolerance: Patient tolerated evaluation without incident     Plan   Plan  Plan: 5-7 times per week  Current Treatment Recommendations: Transfer training,Functional mobility training,Gait training,Strengthening,Patient/Caregiver education & training,Balance training  Safety Devices  Type of Devices: Left in chair,Chair alarm in place,Call light within reach,Nurse notified,Gait belt     Restrictions  Position Activity Restriction  Other position/activity restrictions: NWB L UE; Shoulder immobilizer: May remove for physical therapy, dressing change and hygiene under supervision of a nurse, physician or physical therapy. Subjective   Pain: reports pain in shoulder with mobility, not rated, positioned for comfort at end of session  General  Chart Reviewed: Yes  Patient assessed for rehabilitation services?: Yes  Additional Pertinent Hx: Patient re-evaluated on 4/30 following left TSA on 4/29. Pt to [facility] ED 4/27 with mechanical fall while at the [facility]. Imaging (+) for L shoulder dislocation, Comminuted oblique/transverse fracture of the left humeral head/neck and elbow laceration. Transferred to [facility] for ortho. Pt underwent L shoulder closed reduction and elbow I&D.   PMH: OA, HTN  Family / Caregiver Present: No  Referring Practitioner: Immanuel Harris MD  Referral Date : 04/30/22  Diagnosis: Fall  Follows Commands: Within Functional Limits  General Comment  Comments: Patient supine in bed upon arrival.  Subjective  Subjective: Patient agreeable to PT evaluation. Social/Functional History  Social/Functional History  Lives With: Alone  Type of Home: Apartment  Home Layout: One level  Home Access: Elevator  Bathroom Shower/Tub: Walk-in shower  Bathroom Toilet: Standard  Bathroom Equipment: Grab bars in shower  Home Equipment: Velta Marrow (recliner)  Has the patient had two or more falls in the past year or any fall with injury in the past year?: Yes  ADL Assistance: [address]: Independent (cleaning assist 1x a month- wash linens)  Ambulation Assistance: Independent  Transfer Assistance: Independent  Active : Yes  Occupation: Retired (real estate)  Additional Comments: Pt has no family in town. Pt reports friends/neighbors are her support system.   Vision/Hearing  Hearing: Within functional limits    Cognition   Orientation  Overall Orientation Status: Within Functional Limits  Cognition  Overall Cognitive Status: WFL     Objective   Pulse: 71  Heart Rate Source: Monitor  BP: 136/69  BP Location: Right upper arm  BP Method: Automatic  Patient Position: Semi fowlers  MAP (Calculated): 91.33  Resp: 18  SpO2: 96 %  O2 Device: None (Room air)              AROM RLE (degrees)  RLE AROM: WFL  AROM LLE (degrees)  LLE AROM : WFL  Strength RLE  Strength RLE: WFL  Strength LLE  Strength LLE: WFL           Bed mobility  Supine to Sit: Stand by assistance (head of bed elevated)  Scooting: Stand by assistance  Comment: patient sitting up in bedside chair at end of session  Transfers  Sit to Stand: Contact guard assistance (from EOB and from toilet)  Stand to sit: Contact guard assistance (to toilet and to bedside chair)  Ambulation  Device: Single point cane  Other Apparatus:  (shoulder immobilizer on left)  Assistance: Contact guard assistance  Quality of Gait: forward flexed posture  Gait Deviations: Slow Samantha;Decreased step height;Decreased step length  Distance: 200' in room and hallways returning to bathroom, 20' to bedside chair  More Ambulation?: No  Stairs/Curb  Stairs?: No     Balance  Sitting - Static: Fair;+ (supervision)  Standing - Static: Fair (CGA/SBA to stand at sink)  Standing - Dynamic: Fair (CGA to ambulate with cane)  Comments: patient assisted to change gown and remove/re-apply shoulder immobilizer while sitting EOB        Patient educated in don and doff shoulder sling and educated in ordered shoulder precautions with patient verbalizing understanding of education.       OutComes Score                                                  AM-PAC Score  AM-PAC Inpatient Mobility Raw Score : 18 (04/30/22 1330)  AM-PAC Inpatient T-Scale Score : 43.63 (04/30/22 1330)  Mobility Inpatient CMS 0-100% Score: 46.58 (04/30/22 1330)  Mobility Inpatient CMS G-Code Modifier : CK (04/30/22 1330)          Goals  Short Term Goals  Time Frame for Short term goals: Discharge  Short term goal 1: sit <> stand supervison  Short term goal 2: ambulate >250ft with cane supervision  Long Term Goals  Time Frame for Long term goals : discharge  Long term goal 1: patient will perform bed mobility with supervision  Long term goal 2: patient will perform transfers sit<>stand with supervision  Long term goal 3: patient will ambulate 250' with straight cane and supervision  Patient Goals   Patient goals : to go home       Therapy Time   Individual Concurrent Group Co-treatment   Time In 1153         Time Out 1231         Minutes 38         Timed Code Treatment Minutes: 23 Minutes    Timed Code Treatment Minutes:  23 Minutes    Total Treatment Minutes:    23 minutes treatment + 15 minutes evaluation = 38 total treatment minutes  If patient discharges prior to next session this note will serve as a discharge summary. Please see below for the latest assessment towards goals.    Joanna SOSA [facility].

## 2022-04-30 NOTE — PLAN OF CARE
Ox4. Tylenol and ice pack administered for shoulder discomfort. VSS. Fall precautions in place. Will continue to monitor     Problem: Pain  Goal: Verbalizes/displays adequate comfort level or baseline comfort level  Outcome: Progressing  Tylenol administered for shoulder pain. Ice pack also administered to shoulder. Has provided relief. Will continue to monitor     Problem: Safety - Adult  Goal: Free from fall injury  Outcome: Progressing   Side rails x3. Call light within reach. Bed alarm in use.  Will continue to monitor

## 2022-04-30 NOTE — OP NOTE
[facility] [place] [address]                                OPERATIVE REPORT    PATIENT NAME: Raphael Ziegler            :        1940  MED REC NO:   [id number]                          ROOM:       5527  ACCOUNT NO:   [de-identified]                           ADMIT DATE: 2022  PROVIDER:     Mikael Guerrero MD    DATE OF PROCEDURE:  2022    PREOPERATIVE DIAGNOSIS:  Left shoulder comminuted four-part fracture  dislocation. POSTOPERATIVE DIAGNOSIS:  Left shoulder comminuted four-part fracture  dislocation with incarcerated biceps tendon. PROCEDURES:  Left shoulder exam under anesthesia, arthrotomy with open  reduction of fracture dislocation with extraction of dislocated humeral  head, open biceps tenodesis, reverse total shoulder replacement with  repair of greater and lesser tuberosity fragments and excision of  glenoid rim fracture fragments. Modifier 22 applies because of high complexity born out by the fact that  the humeral head was dislocated anteriorly and positioned close to the  brachial plexus. This took additional time to extricate the humeral  head and in addition, the greater and lesser tuberosities were  comminuted extremely to a point that additional suture fixation needed  to be used. Collectively, this added roughly 30% to 40% added work and  effort compared to an uncomplicated reverse shoulder arthroplasty for  fracture. ANESTHESIA:  General anesthesia, interscalene block. IV FLUIDS:  2200 mL of crystalloids. ESTIMATED BLOOD LOSS:  200 mL. COMPLICATIONS:  None. SURGEON:  Mikael Guerrero MD    ASSISTANT:  Fanta Young MD    IMPLANTS:  DJO small shell, regular in length, AltiVate size 12, 32  neutral liner, 32 minus 4 glenosphere, standard baseplate, four locking  screws. FINDINGS:  Examination under anesthesia revealed obvious bruising,  slight deformity and swelling. Arthrotomy revealed a fracture  dislocated shoulder. The humeral head was anteriorly on the glenoid. There was a glenoid rim fracture involving roughly 10% to 15% of the  anterior glenoid rim. There was extreme comminution in greater and  lesser tuberosities. They were basically just small little fragments. We could bone graft underneath with bone graft from the resected humeral  head. The biceps was translocated and amenable to tenotomy and then  tenodesis. We were able to get a reverse total shoulder replacement,  repair of tuberosities and do a soft tissue tenodesis for the biceps. No other obvious anomalous findings. BRIEF HISTORY AND PRESENTING ILLNESS:  The patient is a very spry  [place]-born 78-year-old woman who lives alone and drives and does  everything on her own who fell and sustained a fracture dislocation. She had a severe fracture dislocation and attempt was made by the doctor  on call to do a closed reduction, but the humeral head was  and  remained on the anterior soft tissues. There appeared to be a glenoid  rim fracture by CT. The patient is high functioning although she is  right hand dominant. It is very clear leaving this alone was not an  option. In fact, we were little bit concerned about the possibility of  vascular injury at attempt to remove that humeral head fragment, so we  had Vascular Surgery on standby. We recommended early surgical  intervention to minimize the risk and optimize her recovery. She was  counseled only recourse for this would be a reverse total shoulder  replacement with repair of greater and lesser tuberosities, so injury  would be addressed as encountered. She understood the risks such as  bleeding, infection, anesthetic risks, injury to nerve and blood  vessels, stiffness, weakness, incomplete pain relief and need for  further surgery. She understood all this and wished to proceed.   She  gave informed consent and was scheduled on an elective basis after  appropriate preoperative medical clearance. OPERATIVE PROCEDURE:  On the day of the procedure, taken to the  operating room, placed supine on the operating table. General  anesthesia was established. Preoperative antibiotics and interscalene  block were given in the holding area. Under anesthesia, exam was  carried out with findings as above. The patient was positioned using  Tenet beach-chair positioner in the semi-sitting position. Trunk was  elevated to 45 degrees. All pressure points were padded. The patient's  left shoulder and arm were prepped and draped in a standard manner for  shoulder replacement surgery. We used [place] to cover the operative  field. We used a Tenet Spider for arm position. All members of the  surgical team wore body exhaust suits. We had C-arm fluoroscopy  available throughout the procedure. We brought our . We obtained   x-rays to make sure that we had good positioning and then made a  deltopectoral approach, roughly 10 cm in length from the coracoid  coursing down towards the level of the deltoid tubercle. The incision  was carried out with a skin knife through the skin and subcutaneous  tissue. Electrocautery was used to establish hemostasis. Gelpi  retractor was placed. Deltopectoral interval was identified and  developed using a combination of blunt and sharp dissection. The  cephalic vein was retracted laterally. We released subdeltoid,  subacromial adhesions with Aguilera elevator. The clavipectoral fascia was  quite thick in places and we excised it. We opened up the transverse  humeral ligament. The biceps was tendinopathic and so, we tenodesed the  upper border of the pectoralis major tendon. This was done using two #2  Ethibond figure-of-eight stitches. Tendon proximal to this was excised.   We also sitting underneath it rather broad conjoined muscle because  again everything was shifted medially because of dislocated humeral  head. At this point, we  greater and lesser tuberosities. We  placed #2 Ethibond sutures at the bone-tendon interface of both. Deep  to that, the humeral head could be seen, struck against the glenoid. We  very carefully and meticulously positioned and repositioned using a Aguilera  elevator. We  the adhesions and then extricated that it was  devoid of any soft tissue attachments. We could see a glenoid rim  fracture and fractured fragments were encased in the subscapularis  muscle and fascia, so we excised those fragments. We could see there  was 10% to 15% bone loss. There was still adequate glenoid bone stock  to accommodate a baseplate. At this point, placed our definitive  sutures at the bone-tendon interface. Four #2 FiberWire sutures were  placed at the greater tuberosity from top to bottom, two to the lesser  tuberosity top and bottom. We made two pairs of drill holes with a 1.5  wire-passing drill bit about a 1.5 cm from the edge of the fracture line  of the shaft on either side of the tuberosity, passed #2 FiberWire  sutures through these pairs. These were used for vertical fixation. We  also prepared the humerus using canal finder to find the canal, reaming  up to size 12. We knew we could use the small shell regular stem or 108  stem. At this point, we turned our attention towards the glenoid. We placed  retractor to retract the humeral shaft and tuberosities. We had very  good view of the glenoid. We excised the labrum. We did a 360-degree  release. We used a Aguilera elevator to remove the glenoid cartilage. The  glenoid was diminutive; to begin with, we made smaller by the glenoid  rim fracture we had just removed, so we still placed our drill bit  without any correction and centered on the thicker inferior part. We  drilled and measured 27 mm.   We inserted our tap and reamed over the tap  and a starter, small reamer without incorporating a significant inferior  tilt or version correction. We inserted our baseplate, excellent  fixation. Only three screws could be accommodated. Placed a guide and  drilled for peripheral locking screws. We recorded screw lengths with  calibrated drill bit. We inserted the screws under power and then hand  tightened. We had good fixation. We empirically chose a 30 -4  glenosphere and impacted in place and inserted the set screw. We then  dislocated the humeral head. We irrigated the humeral canal.  We  brought our implant and impacted it into place. We obtained very  good scratch fit. It really was not going anywhere and had good  rotational stability. At this point, we trialed a 32 neutral liner and  wanted to confirm that the liner was stable and that we could repair the  tuberosities without any tension, that was a definitive liner. So, we  impacted that in place. Prior to reduction, we did pass our sutures. One suture noted at he the lesser tuberosity went through behind a shell  towards the greater tuberosity. Two greater tuberosity sutures were  placed around the medial border of the humeral implant and one of the  inferior greater tuberosity sutures was passed through a suture islet  anteriorly. The two additional sutures were then passed later. We  reduced the shoulder one final time and tied these four sutures getting  good tuberosity to implant tuberosity-tuberosity reduction. Additional  sutures in greater and lesser tuberosities were passed anteriorly  through their counterpart and tied. There was some bicipital groove  comminution, so we had to tack that fragment down and behind it, we  placed a little bit of cancellous bone harvest resected humeral head. We then passed our vertical sutures.   These were the sutures through  pairs of drill holes in the shaft and one more lateral was passed  through the posterior rotator cuff, the one more medial passed through  the subscapularis and across to the supraspinatus to reduce that small  bony fragment. We tied those sutures vertically getting very good  tension and we had a very stable construct over the anterior separation. The lesser tuberosities were oversewn with multiple #2 Ethibond  stitches. We irrigated copiously, documented our work with fluoroscopy  one final time to make sure we had good reduction of tuberosities and no  high fragments. A small metaphyseal fragment that had broken up now  keyed in almost perfectly because we were able to reduce the  tuberosities nicely. We closed the wound in layers over 1 gm of  vancomycin powder. We used 0 Vicryl in a figure-of-eight fashion to  close the deltopectoral interval.  2-0 Vicryl in interrupted inverted  fashion to close the subcutaneous tissue. Routine skin closure with 4-0  Monocryl and Prineo dressing to close the skin. A sterile compressive  dressing was applied. The arm was placed in a padded soft brace. The  patient was repositioned in the supine position before this and promptly  awakened from anesthesia having tolerated the procedure well and was  taken from the operating room to the recovery room in satisfactory  condition. PLAN:  The patient will be readmitted to medical management. She would  like to be home in a couple of days. She is going to have a daughter  come in from [place] and she will be spending a few weeks with her. She  will start delayed rehab program.  She is to be nonweightbearing on the  operative left side.         Falguni Valentin MD    D: 04/29/2022 13:17:42       T: 04/29/2022 22:46:17     [email or web address]  Job#: [id number]     Doc#: [id number]    CC:

## 2022-05-01 LAB
ANION GAP SERPL CALCULATED.3IONS-SCNC: 10 MMOL/L (ref 3–16)
BASOPHILS ABSOLUTE: 0.1 K/UL (ref 0–0.2)
BASOPHILS RELATIVE PERCENT: 0.6 %
BUN BLDV-MCNC: 20 MG/DL (ref 7–20)
CALCIUM SERPL-MCNC: 8.1 MG/DL (ref 8.3–10.6)
CHLORIDE BLD-SCNC: 99 MMOL/L (ref 99–110)
CO2: 24 MMOL/L (ref 21–32)
CREAT SERPL-MCNC: 0.7 MG/DL (ref 0.6–1.2)
EOSINOPHILS ABSOLUTE: 0.1 K/UL (ref 0–0.6)
EOSINOPHILS RELATIVE PERCENT: 1.2 %
GFR AFRICAN AMERICAN: >60
GFR NON-AFRICAN AMERICAN: >60
GLUCOSE BLD-MCNC: 107 MG/DL (ref 70–99)
HCT VFR BLD CALC: 27.3 % (ref 36–48)
HEMOGLOBIN: 9.3 G/DL (ref 12–16)
LYMPHOCYTES ABSOLUTE: 1.5 K/UL (ref 1–5.1)
LYMPHOCYTES RELATIVE PERCENT: 11.6 %
MCH RBC QN AUTO: 31.2 PG (ref 26–34)
MCHC RBC AUTO-ENTMCNC: 33.9 G/DL (ref 31–36)
MCV RBC AUTO: 91.8 FL (ref 80–100)
MONOCYTES ABSOLUTE: 1.6 K/UL (ref 0–1.3)
MONOCYTES RELATIVE PERCENT: 12.2 %
NEUTROPHILS ABSOLUTE: 9.5 K/UL (ref 1.7–7.7)
NEUTROPHILS RELATIVE PERCENT: 74.4 %
PDW BLD-RTO: 13.2 % (ref 12.4–15.4)
PLATELET # BLD: 168 K/UL (ref 135–450)
PMV BLD AUTO: 8.9 FL (ref 5–10.5)
POTASSIUM SERPL-SCNC: 4 MMOL/L (ref 3.5–5.1)
RBC # BLD: 2.97 M/UL (ref 4–5.2)
SODIUM BLD-SCNC: 133 MMOL/L (ref 136–145)
WBC # BLD: 12.8 K/UL (ref 4–11)

## 2022-05-01 PROCEDURE — 6370000000 HC RX 637 (ALT 250 FOR IP): Performed by: SURGERY

## 2022-05-01 PROCEDURE — 2580000003 HC RX 258: Performed by: ORTHOPAEDIC SURGERY

## 2022-05-01 PROCEDURE — 6370000000 HC RX 637 (ALT 250 FOR IP): Performed by: INTERNAL MEDICINE

## 2022-05-01 PROCEDURE — 1200000000 HC SEMI PRIVATE

## 2022-05-01 PROCEDURE — 6370000000 HC RX 637 (ALT 250 FOR IP): Performed by: ORTHOPAEDIC SURGERY

## 2022-05-01 PROCEDURE — 87086 URINE CULTURE/COLONY COUNT: CPT

## 2022-05-01 PROCEDURE — 6360000002 HC RX W HCPCS: Performed by: ORTHOPAEDIC SURGERY

## 2022-05-01 PROCEDURE — 2580000003 HC RX 258: Performed by: INTERNAL MEDICINE

## 2022-05-01 PROCEDURE — 97530 THERAPEUTIC ACTIVITIES: CPT

## 2022-05-01 PROCEDURE — 6360000002 HC RX W HCPCS: Performed by: INTERNAL MEDICINE

## 2022-05-01 PROCEDURE — 2580000003 HC RX 258: Performed by: SURGERY

## 2022-05-01 PROCEDURE — 85025 COMPLETE CBC W/AUTO DIFF WBC: CPT

## 2022-05-01 PROCEDURE — 36415 COLL VENOUS BLD VENIPUNCTURE: CPT

## 2022-05-01 PROCEDURE — 80048 BASIC METABOLIC PNL TOTAL CA: CPT

## 2022-05-01 RX ORDER — ENOXAPARIN SODIUM 100 MG/ML
40 INJECTION SUBCUTANEOUS DAILY
Status: DISCONTINUED | OUTPATIENT
Start: 2022-05-01 | End: 2022-05-03 | Stop reason: HOSPADM

## 2022-05-01 RX ORDER — OXYCODONE HYDROCHLORIDE 5 MG/1
5 TABLET ORAL EVERY 4 HOURS PRN
Status: DISCONTINUED | OUTPATIENT
Start: 2022-05-01 | End: 2022-05-03 | Stop reason: HOSPADM

## 2022-05-01 RX ADMIN — SODIUM CHLORIDE, PRESERVATIVE FREE 10 ML: 5 INJECTION INTRAVENOUS at 21:35

## 2022-05-01 RX ADMIN — PANTOPRAZOLE SODIUM 40 MG: 40 TABLET, DELAYED RELEASE ORAL at 05:57

## 2022-05-01 RX ADMIN — MORPHINE SULFATE 2 MG: 2 INJECTION, SOLUTION INTRAMUSCULAR; INTRAVENOUS at 01:29

## 2022-05-01 RX ADMIN — METOPROLOL SUCCINATE 100 MG: 100 TABLET, EXTENDED RELEASE ORAL at 08:41

## 2022-05-01 RX ADMIN — LISINOPRIL 40 MG: 40 TABLET ORAL at 08:41

## 2022-05-01 RX ADMIN — MORPHINE SULFATE 2 MG: 2 INJECTION, SOLUTION INTRAMUSCULAR; INTRAVENOUS at 05:57

## 2022-05-01 RX ADMIN — ACETAMINOPHEN 650 MG: 325 TABLET ORAL at 03:34

## 2022-05-01 RX ADMIN — CEFTRIAXONE 1000 MG: 1 INJECTION, POWDER, FOR SOLUTION INTRAMUSCULAR; INTRAVENOUS at 20:05

## 2022-05-01 RX ADMIN — LEVOTHYROXINE SODIUM 88 MCG: 0.09 TABLET ORAL at 05:57

## 2022-05-01 RX ADMIN — TRAMADOL HYDROCHLORIDE 50 MG: 50 TABLET, COATED ORAL at 00:14

## 2022-05-01 RX ADMIN — SODIUM CHLORIDE, PRESERVATIVE FREE 10 ML: 5 INJECTION INTRAVENOUS at 21:36

## 2022-05-01 ASSESSMENT — PAIN DESCRIPTION - DESCRIPTORS
DESCRIPTORS: ACHING
DESCRIPTORS: ACHING;SORE
DESCRIPTORS: ACHING
DESCRIPTORS: ACHING;SORE
DESCRIPTORS: ACHING

## 2022-05-01 ASSESSMENT — PAIN DESCRIPTION - PAIN TYPE
TYPE: ACUTE PAIN

## 2022-05-01 ASSESSMENT — PAIN DESCRIPTION - FREQUENCY
FREQUENCY: CONTINUOUS

## 2022-05-01 ASSESSMENT — PAIN SCALES - GENERAL
PAINLEVEL_OUTOF10: 3
PAINLEVEL_OUTOF10: 7
PAINLEVEL_OUTOF10: 6
PAINLEVEL_OUTOF10: 6
PAINLEVEL_OUTOF10: 3
PAINLEVEL_OUTOF10: 9

## 2022-05-01 ASSESSMENT — PAIN DESCRIPTION - ONSET
ONSET: ON-GOING

## 2022-05-01 ASSESSMENT — PAIN DESCRIPTION - LOCATION
LOCATION: ELBOW
LOCATION: ELBOW
LOCATION: ARM
LOCATION: ELBOW
LOCATION: ELBOW;HAND

## 2022-05-01 ASSESSMENT — PAIN DESCRIPTION - ORIENTATION
ORIENTATION: LEFT

## 2022-05-01 NOTE — PROGRESS NOTES
Vitals:    04/30/22 1857 04/30/22 2246 05/01/22 0339 05/01/22 0421   BP: (!) 154/70 (!) 155/66 (!) 157/77    Pulse: 71 83 76 67   Resp: 18 18 18    Temp: 98.5 °F (36.9 °C) 98.5 °F (36.9 °C) 97.8 °F (36.6 °C)    TempSrc: Oral Oral Oral    SpO2: 95% 95% 95%    Weight:       Height:            Background: Fall, left shoulder fracture    Heart Monitor: SR/ST     Changes from baseline: none    Neuro: no acute changes     Activity: independent after set up    IV: SL with intermittent IV abx     PRN's Given: Ultram, Melatonin, Tums, Morphine 2mg IV    Goals for next shift: Pain control    Procedures: none    Barriers to Discharge: awaiting urine cultures, IV Abx, low Na

## 2022-05-01 NOTE — PROGRESS NOTES
Hospitalist Progress Note      PCP: Bernarda Parker    Date of Admission: 4/27/2022    Chief Complaint: fall    Hospital Course: Patient is 22-year-old female with history of paroxysmal A. fib not on anticoagulation due to history of hematomas per last cardiology note, hypothyroid presented to [place] ED after she had a mechanical fall. Patient was noted to have comminuted fracture of proximal femur with involvement of neck of left shoulder failed relocation attempted in ER transferred to [facility] for orthopedic eval.    S/p left shoulder ORIF on 4/29    Subjective: Seen and examined today. Pain is uncontrolled. Denies any nausea, vomiting, fever, chills.   No acute event reported overnight      Medications:  Reviewed    Infusion Medications    sodium chloride      sodium chloride Stopped (04/29/22 1356)    sodium chloride       Scheduled Medications    cefTRIAXone (ROCEPHIN) IV  1,000 mg IntraVENous Q24H    metoprolol succinate  100 mg Oral Daily    pantoprazole  40 mg Oral QAM AC    sodium chloride flush  5-40 mL IntraVENous 2 times per day    acetaminophen  650 mg Oral Q6H    sodium chloride flush  5-40 mL IntraVENous 2 times per day    lidocaine-EPINEPHrine  20 mL IntraDERmal Once    levothyroxine  88 mcg Oral Daily    lisinopril  40 mg Oral Daily    sodium chloride flush  5-40 mL IntraVENous 2 times per day     PRN Meds: oxyCODONE, sennosides-docusate sodium, melatonin, calcium carbonate, sodium chloride flush, sodium chloride, sodium chloride flush, sodium chloride, sodium chloride flush, sodium chloride, ondansetron **OR** ondansetron, polyethylene glycol, acetaminophen **OR** acetaminophen, morphine      Intake/Output Summary (Last 24 hours) at 5/1/2022 1240  Last data filed at 5/1/2022 2192  Gross per 24 hour   Intake 350 ml   Output --   Net 350 ml       Physical Exam Performed:    BP (!) 145/76   Pulse 81   Temp 97.7 °F (36.5 °C) (Oral)   Resp 16   Ht 5' 3\" (1.6 m)   Wt 108 lb (49 kg)   SpO2 95%   BMI 19.13 kg/m²     General appearance: No apparent distress, appears stated age and cooperative. HEENT: Pupils equal, round, and reactive to light. Conjunctivae/corneas clear. Neck: Supple, with full range of motion. No jugular venous distention. Trachea midline. Respiratory:  Normal respiratory effort. Clear to auscultation, bilaterally without Rales/Wheezes/Rhonchi. Cardiovascular: Regular rate and rhythm with normal S1/S2 without murmurs, rubs or gallops. Abdomen: Soft, non-tender, non-distended with normal bowel sounds. Musculoskeletal: No clubbing, cyanosis or edema bilaterally. Left shoulder in sling. Skin: Skin color, texture, turgor normal.  No rashes or lesions. Neurologic:  Neurovascularly intact without any focal sensory/motor deficits. Cranial nerves: II-XII intact, grossly non-focal.  Psychiatric: Alert and oriented, thought content appropriate, normal insight  Capillary Refill: Brisk,< 3 seconds   Peripheral Pulses: +2 palpable, equal bilaterally       Labs:   Recent Labs     04/30/22 0718 05/01/22 0726   WBC 15.6* 12.8*   HGB 9.6* 9.3*   HCT 29.6* 27.3*    168     Recent Labs     04/30/22 0718 05/01/22  0726   * 133*   K 4.0 4.0   CL 98* 99   CO2 22 24   BUN 18 20   CREATININE 0.8 0.7   CALCIUM 7.9* 8.1*     No results for input(s): AST, ALT, BILIDIR, BILITOT, ALKPHOS in the last 72 hours. No results for input(s): INR in the last 72 hours. No results for input(s): Olivier Oiler in the last 72 hours. Urinalysis:      Lab Results   Component Value Date    NITRU Negative 04/27/2022    WBCUA 21-50 04/27/2022    BACTERIA 1+ 04/27/2022    RBCUA 3-4 04/27/2022    BLOODU TRACE 04/27/2022    SPECGRAV 1.020 04/27/2022    GLUCOSEU Negative 04/27/2022       Radiology:  XR SHOULDER LEFT 1 VW   Final Result   See above      FLUORO FOR SURGICAL PROCEDURES   Final Result      Intraoperative fluoroscopy provided as above.  See operative report for details. XR HUMERUS LEFT (MIN 2 VIEWS)   Final Result      Intraoperative fluoroscopy provided as above. See operative report for details. XR SHOULDER LEFT 1 VW   Final Result      Intraoperative fluoroscopy provided as above. See operative report for details. FLUORO FOR SURGICAL PROCEDURES   Final Result      Intraoperative fluoroscopy provided as above. See operative report for details. XR HIP 2-3 VW W PELVIS LEFT   Final Result      1. No acute fracture identified in left hip.   2.  Old left pubic bone fracture. XR CHEST 1 VIEW   Final Result      1. No acute cardiopulmonary findings. 2.  Multiple chronic right rib fractures. 3.  Multiple left rib fractures difficult to age but also may be chronic. 4.  Increased displacement across the left proximal humerus fracture. Persistent anterior dislocation of the left shoulder. 5.  Age-indeterminate T9 and T12 vertebral compression fractures. Correlate with point tenderness. CT SHOULDER LEFT WO CONTRAST   Final Result            Final      1. Severely comminuted three-part left proximal humerus fracture. 2.  Anterior glenohumeral dislocation. The humeral head is engaged along the anterior glenoid in the large and deep Hill-Sachs fracture. 3.  Anterior glenoid rim displaced Bankart-type fracture. 4.  Anterior coracoid tip displaced fracture. 5.  Large glenohumeral joint lipohemarthrosis. 6.  Hematoma within the subacromial-subdeltoid bursa. 7.  Hematoma within in the left axilla. IMPRESSION:      1. Severely comminuted report to the left proximal humerus fracture. 2.  Anterior glenohumeral dislocation. The humeral head is engaged along the anterior glenoid in a large and deep deep Hill-Sachs fracture. 3.  Anterior glenoid rim displaced Bankart type fracture   4. Anterior coracoid tip displaced fracture. 5.  Large pineal joint lipohemarthrosis. 6.  Hematoma within the subacromial-subdeltoid bursa. 7.  Hematoma within the right axilla. XR SHOULDER LEFT (MIN 2 VIEWS)   Final Result      1. Anterior dislocation of the left shoulder. 2. Comminuted oblique/transverse fracture of the left humeral head/neck. XR WRIST LEFT (MIN 3 VIEWS)   Final Result      No acute bony pathology identified. Significant degenerative changes as above      XR ELBOW LEFT (2 VIEWS)   Final Result   See above      XR HUMERUS LEFT (MIN 2 VIEWS)   Final Result   See above              Assessment/Plan:    Active Hospital Problems    Diagnosis Date Noted    Anterior shoulder dislocation, left, initial encounter [T52.175C]      Priority: Medium    Elbow laceration, left, initial encounter [S51.012A]      Priority: Medium    Left supracondylar humerus fracture, closed, initial encounter Bharat Drake 04/27/2022     Priority: Medium    Humerus head fracture, left, with delayed healing, subsequent encounter Melanie Edi 04/27/2022     Priority: Medium     #Frequent falls  Work-up done by PCP in cardiologist.  B12 838.     # left shoulder fracture with dislocation  S/p left shoulder ORIF POD #2  Pain control. #UTI  Cultures were never collected. Continue IV Rocephin.     #Paroxysmal A. fib not on anticoagulation due to history of hematoma per cardiology note. Patient is scheduled to get a STACY done to get evaluated for watchman. Continue Toprol-XL. DVT Prophylaxis: Lovenox. Diet: ADULT DIET; Regular  Code Status: Full Code    PT/OT Eval Status: Completed. Dispo -PT OT recommended possible SNF. Patient wants to go home. Her daughter is flying in town today versus tomorrow.     Discharge once patient has help at home versus SNF    Ava Leonardo MD    This chart was likely completed using voice recognition technology and may contain unintended grammatical , phraseology,and/or punctuation errors

## 2022-05-01 NOTE — CARE COORDINATION
Case Management Assessment           Initial Evaluation                Date / Time of Evaluation: 5/1/2022 4:45 PM                 Assessment Completed by: Robyn Cárdenas RN    Patient Name: Nixon Aceves     YOB: 1940  Diagnosis: Fall, initial encounter [W19. XXXA]  Humeral head fracture, left, closed, initial encounter [S42.292A]  Anterior shoulder dislocation, left, initial encounter [W56.641H]  Elbow laceration, left, initial encounter [S51.012A]  Leukocytosis, unspecified type [D72.829]  Left supracondylar humerus fracture, closed, initial encounter [S42.412A]  Humerus head fracture, left, with delayed healing, subsequent encounter [S42.292G]     Date / Time: 4/27/2022  3:16 PM    Patient Admission Status: Inpatient    If patient is discharged prior to next notation, then this note serves as note for discharge by case management. Current PCP: Nikolai Patient: No    Chart Reviewed: Yes  Patient/ Family Interviewed: Yes    Initial assessment completed at bedside with: patient    Hospitalization in the last 30 days: No    Emergency Contacts:  Extended Emergency Contact Information  Primary Emergency Contact: Candelaria Sanches  Address: [address] [facility]/ [address] Phone: [phone]  Relation: Child  Secondary Emergency Contact: None,Per Pt   [place]  Relation: Other    Advance Directives:   Code Status: Full Code        Financial  Payor: Michael Pfeiffer / Plan: Merlin Crazier / Product Type: *No Product type* /     Pre-cert required for SNF: Yes    Pharmacy  No Pharmacies Listed    Potential assistance Purchasing Medications:    Does Patient want to participate in local refill/ meds to beds program?:      Meds To [employer] Rules:  1. Can ONLY be done Monday- Friday between 8:30am-5pm  2. Prescription(s) must be in pharmacy by 3pm to be filled same day  3. Copy of patient's insurance/ prescription drug card and patient face sheet must be sent along with the prescription(s)  4. Cost of Rx cannot be added to hospital bill. If financial assistance is needed, please contact unit  or ;  or  CANNOT provide pharmacy voucher for patients co-pays  5. Patients can then  the prescription on their way out of the hospital at discharge, or pharmacy can deliver to the bedside if staff is available. (payment due at time of pick-up or delivery - cash, check, or card accepted)     Able to afford home medications/ co-pay costs: Yes    ADLS  Support Systems:      PT AM-PAC: 18 /24  OT AM-PAC: 16 /24    [place]: 1 level   Steps: elevator    Plans to RETURN to current housing: Yes  Barriers to RETURNING to current housing: none    [address]  Currently ACTIVE with [address]: No  Home Care Agency: Not Applicable          Durable Medical Equipment  DME Provider: n/a  Equipment: cane    Home Oxygen and [address] [place] prior to admission: No  [address]: Not Applicable      Dialysis  Active with HD/PD prior to admission: No  Nephrologist:     HD Center:  Not Applicable    DISCHARGE PLAN:  Disposition: Home with [address]: tbd, possible outpatient PT     Transportation PLAN for discharge: family     Factors facilitating achievement of predicted outcomes: Family support    Barriers to discharge: Medical complications    Additional Case Management Notes:  Patient states she lives alone on [address] floor of the [facility]. Patient states she is independent, has a cane, friends and neighbors to support her. She has family coming to town tomorrow to stay with her. She is interested in outpatient therapy. SW to follow up with MD johnson for therapy. The Plan for Transition of Care is related to the following treatment goals of Fall, initial encounter [W19. XXXA]  Humeral head fracture, left, closed, initial encounter [S42.292A]  Anterior shoulder dislocation, left, initial encounter [S43.015A]  Elbow laceration, left, initial encounter [S51.012A]  Leukocytosis, unspecified type [D72.829]  Left supracondylar humerus fracture, closed, initial encounter [S42.412A]  Humerus head fracture, left, with delayed healing, subsequent encounter [S42.292G]    The Patient and/or patient representative Gaurav Hogan and her family were provided with a choice of provider and agrees with the discharge plan Yes    Freedom of choice list was provided with basic dialogue that supports the patient's individualized plan of care/goals and shares the quality data associated with the providers.  Yes    Care Transition patient: No    Ja Hernandez RN  [facility]  Case Management Department  Ph: [phone]   Fax: [phone]

## 2022-05-01 NOTE — PROGRESS NOTES
Occupational Therapy  Facility/Department: [facility] 5T ORTHO/NEURO  Daily Treatment Note  NAME: Luis Morse  : 1940  MRN: [id number]    Date of Service: 2022    Discharge Recommendations:     Luis Morse scored a 16/24 on the AM-PAC ADL Inpatient form. Current research shows that an AM-PAC score of 18 or greater is typically associated with a discharge to the patient's home setting. Based on the patient's AM-PAC score, and their current ADL deficits, it is recommended that the patient have 2-3 sessions per week of Occupational Therapy at d/c to increase the patient's independence. At this time, this patient demonstrates the endurance and safety to discharge home with previous services and OP therapy as ordered by physician and a follow up treatment frequency of 2-3x/wk. Please see assessment section for further patient specific details. If patient discharges prior to next session this note will serve as a discharge summary. Please see below for the latest assessment towards goals. Patient Diagnosis(es): The primary encounter diagnosis was Anterior shoulder dislocation, left, initial encounter. Diagnoses of Humeral head fracture, left, closed, initial encounter, Elbow laceration, left, initial encounter, Fall, initial encounter, and Leukocytosis, unspecified type were also pertinent to this visit. Assessment    Assessment: pt demos increased functional mobility and tolerance this date requiring SBA without device for transfers and ambulation. Pt tolerated ambulated in halls of >200ft without LOB and minimal fatigue. Pt states her daugthers will be with her to assist over next 3 weeks. Cont. OT POC. Activity Tolerance: Patient tolerated treatment well      Plan   Plan  Times per Week: 7  Times per Day: Daily  Current Treatment Recommendations: Equipment evaluation, education, & procurement;Patient/Caregiver education & training; Safety education & training;Functional mobility training;Self-Care / ADL;ROM;Endurance training     General  Chart Reviewed: Yes  Additional Pertinent Hx: Ad[facility] from [facility] ED s/p fall + L shoulder dislocation, fx and elbow abrashion  to OR 4/27 s/p  L shoulder closed reduction and Elbow I&D        4/29 openreduction of fracture dislocation with extraction of dislocated humeralhead, open biceps tenodesis, reverse total shoulder replacement withrepair of greater and lesser tuberosity fragments and excision ofglenoid rim fracture fragments. PMHX:OA, HTN and thyroid dx - per MD notes pt reports lots of falls with chronic bilateral rib fractures and compression fractures of the spine. Family / Caregiver Present: No  Diagnosis: L shoulder dislocation/ fx  dur to fall s/p ORIF 4/27  Subjective   Subjective  Subjective: pt in bed upon arrival, hesitant but ultimately agreeable to walk with OT. \"I'm not usually hesitant like this but I was so groggy on meds this morning\" and later, \"I am glad I walked, I am more confident on my feet now\"  Pain: pain with mobility, not rated  Cognition  Overall Cognitive Status: WFL        Objective    Vitals     Bed Mobility Training  Bed Mobility Training: Yes  Supine to Sit: Supervision (HOB slightly elevated, slow and effortful)  Scooting: Independent (to EOB)  Balance  Sitting: Intact  Standing:  (SBA)  Transfer Training  Transfer Training: Yes  Overall Level of Assistance: Stand-by assistance (from bed>recliner without AD.)  Sit to Stand: Stand-by assistance  Stand to Sit: Stand-by assistance  Gait  Overall Level of Assistance: Stand-by assistance (pt ambulated >200 ft in jacobsen with SBA and no LOB. Minimal fatigue.)     ADL  Additional Comments: pt declined all ADLs. Patient Education  Education Given To: Patient  Education Provided: Role of Therapy;Plan of Care;Home Exercise Program;Precautions; ADL Adaptive Strategies;Transfer Training  Education Provided Comments: pt educated on importance of use of L hand and provided hand exercises to complete for increased blood flow and ROM. Education Method: Verbal;Demonstration  Barriers to Learning: None  Education Outcome: Verbalized understanding;Continued education needed;Demonstrated understanding    Pt left in recliner with call light in reach, chair alarm engaged and all needs met. RN notified.   Goals  Short Term Goals  Time Frame for Short term goals: at d/c  Short Term Goal 1: Stance x 8 mins with supervision for ADLs/ IADLs  Short Term Goal 2: Pt/caregiver demo Don/ doff immobilizer with supervision  Short Term Goal 3: LE Dressing with setup /modified tech  Patient Goals   Patient goals : Go home and take care of myself       Therapy Time   Individual Concurrent Group Co-treatment   Time In 1400         Time Out 1425         Minutes [address], OT

## 2022-05-02 LAB
ANION GAP SERPL CALCULATED.3IONS-SCNC: 10 MMOL/L (ref 3–16)
ANION GAP SERPL CALCULATED.3IONS-SCNC: 6 MMOL/L (ref 3–16)
BASOPHILS ABSOLUTE: 0.1 K/UL (ref 0–0.2)
BASOPHILS RELATIVE PERCENT: 0.9 %
BUN BLDV-MCNC: 18 MG/DL (ref 7–20)
BUN BLDV-MCNC: 28 MG/DL (ref 7–20)
CALCIUM SERPL-MCNC: 8.3 MG/DL (ref 8.3–10.6)
CALCIUM SERPL-MCNC: 8.4 MG/DL (ref 8.3–10.6)
CHLORIDE BLD-SCNC: 94 MMOL/L (ref 99–110)
CHLORIDE BLD-SCNC: 96 MMOL/L (ref 99–110)
CO2: 26 MMOL/L (ref 21–32)
CO2: 29 MMOL/L (ref 21–32)
CREAT SERPL-MCNC: 0.7 MG/DL (ref 0.6–1.2)
CREAT SERPL-MCNC: 1 MG/DL (ref 0.6–1.2)
EOSINOPHILS ABSOLUTE: 0.3 K/UL (ref 0–0.6)
EOSINOPHILS RELATIVE PERCENT: 3.5 %
GFR AFRICAN AMERICAN: >60
GFR AFRICAN AMERICAN: >60
GFR NON-AFRICAN AMERICAN: 53
GFR NON-AFRICAN AMERICAN: >60
GLUCOSE BLD-MCNC: 100 MG/DL (ref 70–99)
GLUCOSE BLD-MCNC: 102 MG/DL (ref 70–99)
HCT VFR BLD CALC: 27 % (ref 36–48)
HEMOGLOBIN: 9.3 G/DL (ref 12–16)
LYMPHOCYTES ABSOLUTE: 1.2 K/UL (ref 1–5.1)
LYMPHOCYTES RELATIVE PERCENT: 12.3 %
MCH RBC QN AUTO: 31.7 PG (ref 26–34)
MCHC RBC AUTO-ENTMCNC: 34.4 G/DL (ref 31–36)
MCV RBC AUTO: 92.2 FL (ref 80–100)
MONOCYTES ABSOLUTE: 1.2 K/UL (ref 0–1.3)
MONOCYTES RELATIVE PERCENT: 11.9 %
NEUTROPHILS ABSOLUTE: 7 K/UL (ref 1.7–7.7)
NEUTROPHILS RELATIVE PERCENT: 71.4 %
PDW BLD-RTO: 13.3 % (ref 12.4–15.4)
PLATELET # BLD: 189 K/UL (ref 135–450)
PMV BLD AUTO: 8.6 FL (ref 5–10.5)
POTASSIUM SERPL-SCNC: 3.9 MMOL/L (ref 3.5–5.1)
POTASSIUM SERPL-SCNC: 4.4 MMOL/L (ref 3.5–5.1)
RBC # BLD: 2.92 M/UL (ref 4–5.2)
SODIUM BLD-SCNC: 128 MMOL/L (ref 136–145)
SODIUM BLD-SCNC: 129 MMOL/L (ref 136–145)
SODIUM BLD-SCNC: 132 MMOL/L (ref 136–145)
URINE CULTURE, ROUTINE: NORMAL
WBC # BLD: 9.9 K/UL (ref 4–11)

## 2022-05-02 PROCEDURE — 6360000002 HC RX W HCPCS: Performed by: INTERNAL MEDICINE

## 2022-05-02 PROCEDURE — 6370000000 HC RX 637 (ALT 250 FOR IP): Performed by: SURGERY

## 2022-05-02 PROCEDURE — 36415 COLL VENOUS BLD VENIPUNCTURE: CPT

## 2022-05-02 PROCEDURE — 6370000000 HC RX 637 (ALT 250 FOR IP): Performed by: ORTHOPAEDIC SURGERY

## 2022-05-02 PROCEDURE — 6370000000 HC RX 637 (ALT 250 FOR IP): Performed by: STUDENT IN AN ORGANIZED HEALTH CARE EDUCATION/TRAINING PROGRAM

## 2022-05-02 PROCEDURE — 1200000000 HC SEMI PRIVATE

## 2022-05-02 PROCEDURE — 97530 THERAPEUTIC ACTIVITIES: CPT

## 2022-05-02 PROCEDURE — 97110 THERAPEUTIC EXERCISES: CPT

## 2022-05-02 PROCEDURE — 80048 BASIC METABOLIC PNL TOTAL CA: CPT

## 2022-05-02 PROCEDURE — 6370000000 HC RX 637 (ALT 250 FOR IP): Performed by: INTERNAL MEDICINE

## 2022-05-02 PROCEDURE — 85025 COMPLETE CBC W/AUTO DIFF WBC: CPT

## 2022-05-02 PROCEDURE — 2580000003 HC RX 258: Performed by: INTERNAL MEDICINE

## 2022-05-02 PROCEDURE — 84295 ASSAY OF SERUM SODIUM: CPT

## 2022-05-02 PROCEDURE — 2580000003 HC RX 258: Performed by: ORTHOPAEDIC SURGERY

## 2022-05-02 PROCEDURE — 97116 GAIT TRAINING THERAPY: CPT

## 2022-05-02 PROCEDURE — 97535 SELF CARE MNGMENT TRAINING: CPT

## 2022-05-02 PROCEDURE — 2580000003 HC RX 258: Performed by: SURGERY

## 2022-05-02 RX ORDER — OXYCODONE HYDROCHLORIDE 5 MG/1
5 TABLET ORAL EVERY 6 HOURS PRN
Qty: 12 TABLET | Refills: 0 | Status: SHIPPED | OUTPATIENT
Start: 2022-05-02 | End: 2022-05-05

## 2022-05-02 RX ORDER — OXYCODONE HYDROCHLORIDE 5 MG/1
5 TABLET ORAL EVERY 6 HOURS PRN
Qty: 12 TABLET | Refills: 0 | Status: SHIPPED | OUTPATIENT
Start: 2022-05-02 | End: 2022-05-02

## 2022-05-02 RX ORDER — TOLVAPTAN 15 MG/1
15 TABLET ORAL ONCE
Status: COMPLETED | OUTPATIENT
Start: 2022-05-02 | End: 2022-05-02

## 2022-05-02 RX ADMIN — CEFTRIAXONE 1000 MG: 1 INJECTION, POWDER, FOR SOLUTION INTRAMUSCULAR; INTRAVENOUS at 19:44

## 2022-05-02 RX ADMIN — ACETAMINOPHEN 650 MG: 325 TABLET ORAL at 09:52

## 2022-05-02 RX ADMIN — PANTOPRAZOLE SODIUM 40 MG: 40 TABLET, DELAYED RELEASE ORAL at 06:02

## 2022-05-02 RX ADMIN — ENOXAPARIN SODIUM 40 MG: 100 INJECTION SUBCUTANEOUS at 09:52

## 2022-05-02 RX ADMIN — METOPROLOL SUCCINATE 100 MG: 100 TABLET, EXTENDED RELEASE ORAL at 09:52

## 2022-05-02 RX ADMIN — LISINOPRIL 40 MG: 40 TABLET ORAL at 09:52

## 2022-05-02 RX ADMIN — SODIUM CHLORIDE, PRESERVATIVE FREE 10 ML: 5 INJECTION INTRAVENOUS at 09:38

## 2022-05-02 RX ADMIN — LEVOTHYROXINE SODIUM 88 MCG: 0.09 TABLET ORAL at 06:02

## 2022-05-02 RX ADMIN — Medication 15 MG: at 16:37

## 2022-05-02 RX ADMIN — ACETAMINOPHEN 650 MG: 325 TABLET ORAL at 16:37

## 2022-05-02 RX ADMIN — ACETAMINOPHEN 650 MG: 325 TABLET ORAL at 21:26

## 2022-05-02 RX ADMIN — SODIUM CHLORIDE, PRESERVATIVE FREE 10 ML: 5 INJECTION INTRAVENOUS at 20:09

## 2022-05-02 ASSESSMENT — ENCOUNTER SYMPTOMS
EYE ITCHING: 0
RESPIRATORY NEGATIVE: 1
ALLERGIC/IMMUNOLOGIC NEGATIVE: 1
EYES NEGATIVE: 1
FACIAL SWELLING: 0
TROUBLE SWALLOWING: 0
EYE DISCHARGE: 0
ABDOMINAL DISTENTION: 0
RHINORRHEA: 0
ABDOMINAL PAIN: 0

## 2022-05-02 ASSESSMENT — PAIN DESCRIPTION - ORIENTATION: ORIENTATION: LEFT

## 2022-05-02 ASSESSMENT — PAIN DESCRIPTION - DESCRIPTORS: DESCRIPTORS: BURNING;ACHING

## 2022-05-02 ASSESSMENT — PAIN SCALES - GENERAL: PAINLEVEL_OUTOF10: 4

## 2022-05-02 ASSESSMENT — PAIN DESCRIPTION - LOCATION: LOCATION: ARM

## 2022-05-02 NOTE — PROGRESS NOTES
Physical Therapy  Facility/Department: [facility] 5T ORTHO/NEURO  Daily Treatment Note  NAME: Mani Conley  : 1940  MRN: [id number]    Date of Service: 2022    Discharge Recommendations:  Mani Conley scored a 20/24 on the AM-PAC short mobility form. Current research shows that an AM-PAC score of 18 or greater is typically associated with a discharge to the patient's home setting. Based on the patient's AM-PAC score and their current functional mobility deficits, it is recommended that the patient have 2-3 sessions per week of Physical Therapy at d/c to increase the patient's independence.  At this time, this patient demonstrates the endurance and safety to discharge home with home PT and a follow up treatment frequency of 2-3x/wk. Please see assessment section for further patient specific details.     HOME HEALTH CARE: LEVEL 1 STANDARD     - Initial home health evaluation to occur within 24-48 hours, in patient home   - Therapy to evaluate with goal of regaining prior level of functioning   - Therapy to evaluate if patient has [id number] [facility] needs for personal care    Patient Diagnosis(es): The primary encounter diagnosis was Anterior shoulder dislocation, left, initial encounter. Diagnoses of Humeral head fracture, left, closed, initial encounter, Elbow laceration, left, initial encounter, Fall, initial encounter, and Leukocytosis, unspecified type were also pertinent to this visit. Assessment   Assessment: Pt tolerated session well with minimal pain. Daughter Claudia Hathaway was present engaging in family training; all questions were answered. Ambulation was mildly unsteady but no overt LOB noted.      Plan    Plan  Plan: 5-7 times per week  Current Treatment Recommendations: Transfer training;Functional mobility training;Gait training;Strengthening;Patient/Caregiver education & training;Balance training     Subjective    Subjective  Subjective: Pt was in bed willing to participate c/o mild pain with activity. Orientation  Overall Orientation Status: Within Functional Limits     Objective   Bed Mobility Training  Supine to Sit: Supervision  Scooting: Independent  Transfer Training  Overall Level of Assistance: Stand-by assistance  Gait Training  Gait Training: Yes  Gait  Overall Level of Assistance: Stand-by assistance  Gait Abnormalities: Decreased step clearance; Path deviations;Scissoring  Distance (ft): 150 Feet  Device: None. PT Exercises  Exercise Treatment: Hand/wrist/forearm therex: x10 in all planes. Reviewed shoulder therex with daughter Glenda Connell; advised her to allow home PT to assist with shoulder therex. Static Sitting Balance Exercises: sitting EOB for approx 10 mins engaging daughter in family training to anna and doff sling. Patient Education  Education Given To: Patient  Education Provided: Role of Therapy; Fall Prevention Strategies; Plan of Care;Precautions;Transfer Training  Education Method: Verbal  Barriers to Learning: None  Education Outcome: Verbalized understanding;Continued education needed    Goals  Short Term Goals  Time Frame for Short term goals: Discharge  Short term goal 1: sit <> stand supervison  Short term goal 2: ambulate >250ft with cane supervision  Long Term Goals  Time Frame for Long term goals : discharge  Long term goal 1: patient will perform bed mobility with supervision  Long term goal 2: patient will perform transfers sit<>stand with supervision  Long term goal 3: patient will ambulate 250' with straight cane and supervision  Patient Goals   Patient goals : to go home      Therapy Time   Individual Concurrent Group Co-treatment   Time In 0845         Time Out 0945         Minutes [facility]

## 2022-05-02 NOTE — PROGRESS NOTES
Progress Note    Admit Date: 4/27/2022  Day: 5    Diet: ADULT DIET; Regular; 1000 ml    CC:Fall    HPI :78 y. o. female with PMhx of HTN, paroxysmal atrial fibrillation, hypothyroidism,  anxiety, TIA in April 2021 who presents from [place] ED for orthopedic consultation. Rosa M Parikh has presented to ED via ambulance after fall.  She has been at the [facility] and when she turned to walk away, lost her balance has fallen landing onto her left shoulder on 4/27. Interval history:   Patient seen and examined with daughter at the bedside   PT recommended home with PT   Na down to 129 today was 133 yesterday chronic issue as per the patient managed well with fluid restriciton  Na 128 on repeat labs in the afternoon  Barriers to discharge: Na levels, Likely discharge tomorrow       Review of Systems   Constitutional: Negative for activity change, appetite change, chills, diaphoresis, fatigue, fever and unexpected weight change. HENT: Negative for congestion, dental problem, drooling, ear pain, facial swelling, hearing loss, mouth sores, nosebleeds, postnasal drip, rhinorrhea, sneezing and trouble swallowing. Eyes: Negative. Negative for discharge and itching. Respiratory: Negative. Cardiovascular: Negative. Gastrointestinal: Negative for abdominal distention and abdominal pain. Endocrine: Negative. Genitourinary: Negative. Negative for difficulty urinating and dysuria. Musculoskeletal: Negative. Allergic/Immunologic: Negative. Neurological: Negative. Negative for dizziness, facial asymmetry and headaches. Hematological: Negative. Psychiatric/Behavioral: Negative. Negative for agitation, behavioral problems and confusion.           Medications:     Scheduled Meds:   enoxaparin  40 mg SubCUTAneous Daily    cefTRIAXone (ROCEPHIN) IV  1,000 mg IntraVENous Q24H    metoprolol succinate  100 mg Oral Daily    pantoprazole  40 mg Oral QAM AC    sodium chloride flush  5-40 mL IntraVENous 2 times per day    acetaminophen  650 mg Oral Q6H    sodium chloride flush  5-40 mL IntraVENous 2 times per day    lidocaine-EPINEPHrine  20 mL IntraDERmal Once    levothyroxine  88 mcg Oral Daily    lisinopril  40 mg Oral Daily    sodium chloride flush  5-40 mL IntraVENous 2 times per day     Continuous Infusions:   sodium chloride      sodium chloride Stopped (04/29/22 1356)    sodium chloride       PRN Meds:oxyCODONE, sennosides-docusate sodium, melatonin, calcium carbonate, sodium chloride flush, sodium chloride, sodium chloride flush, sodium chloride, sodium chloride flush, sodium chloride, ondansetron **OR** ondansetron, polyethylene glycol, acetaminophen **OR** acetaminophen, morphine    Objective:   Vitals:   T-max:  Patient Vitals for the past 8 hrs:   BP Temp Temp src Pulse Resp SpO2   05/02/22 0715 (!) 163/75 -- -- 82 16 91 %   05/02/22 0315 134/77 98.4 °F (36.9 °C) Oral 74 16 96 %       Intake/Output Summary (Last 24 hours) at 5/2/2022 0825  Last data filed at 5/2/2022 0551  Gross per 24 hour   Intake --   Output 1450 ml   Net -1450 ml       Physical Exam  Vitals and nursing note reviewed. HENT:      Head: Normocephalic. Cardiovascular:      Rate and Rhythm: Normal rate and regular rhythm. Pulses: Normal pulses. Heart sounds: Normal heart sounds. Pulmonary:      Effort: Pulmonary effort is normal.      Breath sounds: Normal breath sounds. Abdominal:      General: Bowel sounds are normal.   Neurological:      General: No focal deficit present. Mental Status: She is oriented to person, place, and time.    Psychiatric:         Mood and Affect: Mood normal.         Behavior: Behavior normal.     LABS:    CBC:   Recent Labs     04/30/22  0718 05/01/22  0726 05/02/22  0637   WBC 15.6* 12.8* 9.9   HGB 9.6* 9.3* 9.3*   HCT 29.6* 27.3* 27.0*    168 189   MCV 94.8 91.8 92.2     Renal:    Recent Labs     04/30/22  0718 05/01/22  0726 05/02/22  0637   * 133* 129*   K 4.0 4.0 4.4   CL 98* 99 94*   CO2 22 24 29   BUN 18 20 18   CREATININE 0.8 0.7 0.7   GLUCOSE 108* 107* 102*   CALCIUM 7.9* 8.1* 8.3   ANIONGAP 11 10 6     Hepatic:   No results for input(s): AST, ALT, BILITOT, BILIDIR, PROT, LABALBU, ALKPHOS in the last 72 hours. Troponin: No results for input(s): TROPONINI in the last 72 hours. BNP: No results for input(s): BNP in the last 72 hours. Lipids: No results for input(s): CHOL, HDL in the last 72 hours. Invalid input(s): LDLCALCU, TRIGLYCERIDE  ABGs:  No results for input(s): PHART, JYH4TQQ, PO2ART, GKF3XKX, BEART, THGBART, T9QNZUGT, GSL9KYO in the last 72 hours. INR: No results for input(s): INR in the last 72 hours. Lactate: No results for input(s): LACTATE in the last 72 hours. Cultures:  -----------------------------------------------------------------  RAD:   XR SHOULDER LEFT 1 VW   Final Result   See above       FLUORO FOR SURGICAL PROCEDURES   Final Result       Intraoperative fluoroscopy provided as above. See operative report for details.        XR HUMERUS LEFT (MIN 2 VIEWS)   Final Result       Intraoperative fluoroscopy provided as above. See operative report for details.        XR SHOULDER LEFT 1 VW   Final Result       Intraoperative fluoroscopy provided as above. See operative report for details.         FLUORO FOR SURGICAL PROCEDURES   Final Result       Intraoperative fluoroscopy provided as above. See operative report for details.             XR HIP 2-3 VW W PELVIS LEFT   Final Result       1. No acute fracture identified in left hip.   2.  Old left pubic bone fracture.       XR CHEST 1 VIEW   Final Result       1. No acute cardiopulmonary findings. 2.  Multiple chronic right rib fractures. 3.  Multiple left rib fractures difficult to age but also may be chronic. 4.  Increased displacement across the left proximal humerus fracture. Persistent anterior dislocation of the left shoulder. 5.  Age-indeterminate T9 and T12 vertebral compression fractures. Correlate with point tenderness.       CT SHOULDER LEFT WO CONTRAST   Final Result               Final       1. Severely comminuted three-part left proximal humerus fracture. 2.  Anterior glenohumeral dislocation. The humeral head is engaged along the anterior glenoid in the large and deep Hill-Sachs fracture. 3.  Anterior glenoid rim displaced Bankart-type fracture. 4.  Anterior coracoid tip displaced fracture. 5.  Large glenohumeral joint lipohemarthrosis. 6.  Hematoma within the subacromial-subdeltoid bursa. 7.  Hematoma within in the left axilla. IMPRESSION:       1. Severely comminuted report to the left proximal humerus fracture. 2.  Anterior glenohumeral dislocation. The humeral head is engaged along the anterior glenoid in a large and deep deep Hill-Sachs fracture. 3.  Anterior glenoid rim displaced Bankart type fracture   4. Anterior coracoid tip displaced fracture. 5.  Large pineal joint lipohemarthrosis. 6.  Hematoma within the subacromial-subdeltoid bursa. 7.  Hematoma within the right axilla.           XR SHOULDER LEFT (MIN 2 VIEWS)   Final Result       1. Anterior dislocation of the left shoulder. 2. Comminuted oblique/transverse fracture of the left humeral head/neck.       XR WRIST LEFT (MIN 3 VIEWS)   Final Result       No acute bony pathology identified.       Significant degenerative changes as above       XR ELBOW LEFT (2 VIEWS)   Final Result   See above       XR HUMERUS LEFT (MIN 2 VIEWS)   Final Result   See above     Assessment/Plan:     # Left shoulder fracture with dislocation  - After fall seen on X-ray and CT Of the left shoulder which revealed  Severely comminuted three-part left proximal humerus fracture, Anterior glenohumeral dislocation,   - POD # 3 of  total shoulder replacement. - Will give Oxycodone for pain control.     # Hyponatremia  - Due to SIADH, She is Hx of  chronic hyponatremia and is managed well as per outpatient   - Her Na was 129 on 5/2 in the AM and repeat in the afternoon was 128  - One dose of 15 mg Tolvaptan ordered       # Falls   - Hx of Multiple falls prior  admission had complete work up done as an outpatient.    - B 12 was ordered was within normal limits   -Hx of TIA back in April 2021   - Currently on a statin       # UTI   - no complains of Dysuria or increase frequency reported since admisison  -UA positive for leucocyte esterase and 1+ bacteria, 21-50 WBC in urine   -Urine cultures still pending   - Completed 3 doses of Rocephin    # GERD   Resolved   - Protonix 40 mg Ordered   -GI cocktail given for pain relief 4/30     #  HTN   -On lisinopril 40 mg QD   -Toprol 100 mg XL     # Hypothyroidism   - Continue levothyroxine      # HLD   -On rosuvastatin      # Paroxysmal atrial fibrillation   - She is not on any Anticoagulation and sees cardiology   - Her Cardiologist  recommended a watchman catheter to her  -Metoprolol  mg ordered     Code Status: Full  FEN: Regular diet   PPX: N/A  DISPO: 5-S, 9313     Zenia Graves MD, PGY-2  05/02/22  8:25 AM    This patient has been staffed and discussed with Irene Runner, MD.

## 2022-05-02 NOTE — PROGRESS NOTES
Occupational Therapy  Facility/Department: [facility] 5T ORTHO/NEURO  Daily Treatment Note  NAME: Anay Becerra  : 1940  MRN: [id number]    Date of Service: 2022    Discharge Recommendations:  Home with nursing aide,Home with Home health OT  OT Equipment Recommendations  Other: discussed shower chair- dtr verb understanding of purchasing      Patient Diagnosis(es): The primary encounter diagnosis was Anterior shoulder dislocation, left, initial encounter. Diagnoses of Humeral head fracture, left, closed, initial encounter, Elbow laceration, left, initial encounter, Fall, initial encounter, Leukocytosis, unspecified type, Left supracondylar humerus fracture, closed, initial encounter, and Humerus head fracture, left, with delayed healing, subsequent encounter were also pertinent to this visit. Assessment    Assessment: Pt demonstrated improved understanding of UE dressing and bathing. Pt and stepdaughter were educated on dressing techniques post surgery, and both voiced understanding. Pt demonstrating improved sit to stand transfers and improved functional mobiltiy/ standing balance. Pts daughter is very involved in her care and willing/ able to assist. Pt hopeful to dc home with daughter to assist soon, will continue OT POC. Pt plans to follow up with HHOT and will have 24 hr assistance. Continue OT POC. Activity Tolerance: Patient tolerated treatment well  Discharge Recommendations: Home with nursing aide;Home with Home health OT  Other: discussed shower chair- dtr verb understanding of [address]  Times per Week: 7  Times per Day: Daily  Current Treatment Recommendations: Equipment evaluation, education, & procurement;Patient/Caregiver education & training; Safety education & training;Functional mobility training;Self-Care / ADL;ROM;Endurance training     Subjective   Subjective  Subjective: Pt seated in bedside chair upon arrival, agreeable to OT session and requesting information regarding donning/ doffing sling and UB dressing technique. Dtr present during session as well. Objective    Vitals     Bed Mobility Training  Bed Mobility Training: No  Supine to Sit: Supervision  Scooting: Independent  Balance  Standing - Static: Good; Unsupported (standing at sink for oral care)  Transfer Training  Transfer Training: Yes  Overall Level of Assistance: Stand-by assistance  Sit to Stand: Stand-by assistance  Stand to Sit: Stand-by assistance  Toilet Transfer: Stand-by assistance  Gait Training  Gait Training: Yes  Gait  Overall Level of Assistance: Stand-by assistance (mobility from bedside chair into bathroom and back)  Gait Abnormalities: Decreased step clearance; Path deviations;Scissoring  Distance (ft): 150 Feet     ADL  Grooming: Setup;Verbal cueing  Grooming Skilled Clinical Factors: assist to cue pt to avoid twisting with L UE while washing/ drying hands, using L UE less during oral care  UE Bathing: Minimal assistance  UE Bathing Skilled Clinical Factors: via bath wipes, assist from dtr for R UE  LE Bathing: Minimal assistance  LE Bathing Skilled Clinical Factors: assist from dtr for lower LEs  UE Dressing: Maximum assistance  UE Dressing Skilled Clinical Factors: educated pt and dtr on donning/ doffing sling and shirt, dtr verb good understanding. Sling was adjusted for pt comfort. LE Dressing: Minimal assistance  LE Dressing Skilled Clinical Factors: donning/ doffing brief  Toileting: Minimal assistance  Toileting Skilled Clinical Factors: assist to pull up on L side, pt completing sharita care from seated position  OT Exercises  A/AROM Exercises: Educated pt on cervical stretching within pain gino- demod and verb understanding. Patient Education  Education Provided: Role of Therapy;Plan of Care;Home Exercise Program;Precautions; ADL Adaptive Strategies;Transfer Training  Education Provided Comments: Ed pt and daughter on donning/ doffing brace, removal for hygiene/ dressing. understanding verb. Goals  Short Term Goals  Time Frame for Short term goals: at d/c  Short Term Goal 1: Stance x 8 mins with supervision for ADLs/ IADLs  Short Term Goal 2: Pt/caregiver demo Don/ doff immobilizer with supervision  Short Term Goal 3: LE Dressing with setup /modified tech  Patient Goals   Patient goals : Go home and take care of myself       Therapy Time   Individual Concurrent Group Co-treatment   Time In 1054         Time Out 1153         Minutes 59         Timed Code Treatment Minutes: [address]  [address] [facility], OTR/L [id number]

## 2022-05-02 NOTE — PROGRESS NOTES
Patient awake disoriented to place and situation/ Patient reoriented easy. Did know my name when I waked into room.

## 2022-05-02 NOTE — CARE COORDINATION
Cm met with pt at bedside. Plan is to return home. Agreeable to home care. Does not have a preference in home care. Pt states daughters are helping at [place]. CM made referral to [facility].

## 2022-05-03 ENCOUNTER — TELEPHONE (OUTPATIENT)
Dept: ORTHOPEDIC SURGERY | Age: 82
End: 2022-05-03

## 2022-05-03 VITALS
HEART RATE: 78 BPM | HEIGHT: 63 IN | TEMPERATURE: 98.2 F | RESPIRATION RATE: 16 BRPM | DIASTOLIC BLOOD PRESSURE: 79 MMHG | OXYGEN SATURATION: 97 % | BODY MASS INDEX: 19.14 KG/M2 | WEIGHT: 108 LBS | SYSTOLIC BLOOD PRESSURE: 148 MMHG

## 2022-05-03 LAB
ANION GAP SERPL CALCULATED.3IONS-SCNC: 6 MMOL/L (ref 3–16)
BASOPHILS ABSOLUTE: 0.1 K/UL (ref 0–0.2)
BASOPHILS RELATIVE PERCENT: 0.7 %
BUN BLDV-MCNC: 22 MG/DL (ref 7–20)
CALCIUM SERPL-MCNC: 8.7 MG/DL (ref 8.3–10.6)
CHLORIDE BLD-SCNC: 100 MMOL/L (ref 99–110)
CO2: 29 MMOL/L (ref 21–32)
CREAT SERPL-MCNC: 0.8 MG/DL (ref 0.6–1.2)
EOSINOPHILS ABSOLUTE: 0.2 K/UL (ref 0–0.6)
EOSINOPHILS RELATIVE PERCENT: 1.7 %
GFR AFRICAN AMERICAN: >60
GFR NON-AFRICAN AMERICAN: >60
GLUCOSE BLD-MCNC: 114 MG/DL (ref 70–99)
HCT VFR BLD CALC: 28.1 % (ref 36–48)
HEMOGLOBIN: 9.7 G/DL (ref 12–16)
LYMPHOCYTES ABSOLUTE: 0.9 K/UL (ref 1–5.1)
LYMPHOCYTES RELATIVE PERCENT: 6.8 %
MCH RBC QN AUTO: 31.3 PG (ref 26–34)
MCHC RBC AUTO-ENTMCNC: 34.4 G/DL (ref 31–36)
MCV RBC AUTO: 91 FL (ref 80–100)
MONOCYTES ABSOLUTE: 1.5 K/UL (ref 0–1.3)
MONOCYTES RELATIVE PERCENT: 11.4 %
NEUTROPHILS ABSOLUTE: 10.2 K/UL (ref 1.7–7.7)
NEUTROPHILS RELATIVE PERCENT: 79.4 %
PDW BLD-RTO: 13.3 % (ref 12.4–15.4)
PLATELET # BLD: 233 K/UL (ref 135–450)
PMV BLD AUTO: 8.2 FL (ref 5–10.5)
POTASSIUM SERPL-SCNC: 4.2 MMOL/L (ref 3.5–5.1)
RBC # BLD: 3.08 M/UL (ref 4–5.2)
SODIUM BLD-SCNC: 135 MMOL/L (ref 136–145)
WBC # BLD: 12.8 K/UL (ref 4–11)

## 2022-05-03 PROCEDURE — 6370000000 HC RX 637 (ALT 250 FOR IP): Performed by: ORTHOPAEDIC SURGERY

## 2022-05-03 PROCEDURE — 85025 COMPLETE CBC W/AUTO DIFF WBC: CPT

## 2022-05-03 PROCEDURE — 97116 GAIT TRAINING THERAPY: CPT

## 2022-05-03 PROCEDURE — 80048 BASIC METABOLIC PNL TOTAL CA: CPT

## 2022-05-03 PROCEDURE — 6370000000 HC RX 637 (ALT 250 FOR IP): Performed by: SURGERY

## 2022-05-03 PROCEDURE — 97535 SELF CARE MNGMENT TRAINING: CPT

## 2022-05-03 PROCEDURE — 6370000000 HC RX 637 (ALT 250 FOR IP): Performed by: INTERNAL MEDICINE

## 2022-05-03 PROCEDURE — 2580000003 HC RX 258: Performed by: SURGERY

## 2022-05-03 PROCEDURE — 36415 COLL VENOUS BLD VENIPUNCTURE: CPT

## 2022-05-03 RX ADMIN — ACETAMINOPHEN 650 MG: 325 TABLET ORAL at 10:03

## 2022-05-03 RX ADMIN — METOPROLOL SUCCINATE 100 MG: 100 TABLET, EXTENDED RELEASE ORAL at 10:03

## 2022-05-03 RX ADMIN — PANTOPRAZOLE SODIUM 40 MG: 40 TABLET, DELAYED RELEASE ORAL at 05:52

## 2022-05-03 RX ADMIN — SODIUM CHLORIDE, PRESERVATIVE FREE 5 ML: 5 INJECTION INTRAVENOUS at 10:04

## 2022-05-03 RX ADMIN — ACETAMINOPHEN 650 MG: 325 TABLET ORAL at 03:53

## 2022-05-03 RX ADMIN — LISINOPRIL 40 MG: 40 TABLET ORAL at 10:03

## 2022-05-03 RX ADMIN — LEVOTHYROXINE SODIUM 88 MCG: 0.09 TABLET ORAL at 05:52

## 2022-05-03 ASSESSMENT — PAIN SCALES - GENERAL
PAINLEVEL_OUTOF10: 2
PAINLEVEL_OUTOF10: 2

## 2022-05-03 NOTE — DISCHARGE SUMMARY
Hospital Medicine Discharge Summary    Patient ID: Sarah Beth Burns      Patient's PCP: Jerri Lea    Admit Date: 4/27/2022     Discharge Date:   5/3/2022    Admitting Physician: Keyur Malhotra MD     Discharge Physician: Virginia Rivera MD     Discharge Diagnoses: Active Hospital Problems    Diagnosis Date Noted    Anterior shoulder dislocation, left, initial encounter [S43.015A]      Priority: Medium    Elbow laceration, left, initial encounter [S51.012A]      Priority: Medium    Left supracondylar humerus fracture, closed, initial encounter Emma Grajedabarbi 04/27/2022     Priority: Medium    Humerus head fracture, left, with delayed healing, subsequent encounter Janice Caseyniranjan 04/27/2022     Priority: Medium       The patient was seen and examined on day of discharge and this discharge summary is in conjunction with any daily progress note from day of discharge. Hospital Course: Sarah Beth Burns 78 y.o. female history of paroxysmal A. fib not on anticoagulation due to history of hematomas per last cardiology note, hypothyroid presented to [place] ED after she had a mechanical fall.  Patient was noted to have comminuted fracture of proximal femur with involvement of neck of left shoulder failed relocation attempted in ER transferred to [place] [id number] for orthopedic eval.     S/p left shoulder ORIF on 4/29  During hospitalization patient was noted to have low sodium with history of SIADH she was given a dose of Samsca with improvement in sodium. Patient seen and examined on day of discharge. Reports that shoulder pain is controlled. Denies any nausea, vomiting, fever, chills. No acute event reported overnight. Medically stable to be discharged to follow-up with PCP and orthopedic. #Frequent falls. #left shoulder fracture with dislocation s/p ORIF on 4/29. Follow-up orthopedic in 7 to 10 days.   #UTI completed antibiotics with Rocephin  #Paroxysmal A. fib not on anticoagulation due to history of hematoma per cardiology note. Patient is scheduled to get a STACY done to get evaluated for watchman. Continue Toprol-XL. #SIADH. Patient counseled on fluid restriction and increase protein intake. Physical Exam Performed:     BP (!) 148/79   Pulse 78   Temp 98.2 °F (36.8 °C) (Oral)   Resp 16   Ht 5' 3\" (1.6 m)   Wt 108 lb (49 kg)   SpO2 97%   BMI 19.13 kg/m²       General appearance: NAD, appears stated age and cooperative. HEENT: Pupils equal, round, and reactive to light. Conjunctivae/corneas clear. Neck: Supple, with full range of motion. No jugular venous distention. Trachea midline. Respiratory:  Normal respiratory effort. Clear to auscultation, bilaterally without Rales/Wheezes/Rhonchi. Cardiovascular: Regular rate and rhythm with normal S1/S2 without murmurs, rubs or gallops. Abdomen: Soft, non-tender, non-distended with normal bowel sounds. Musculoskeletal: No clubbing, cyanosis or edema bilaterally. Left shoulder in sling. Skin: Skin color, texture, turgor normal.  No rashes or lesions. Neurologic:  Neurovascularly intact without any focal sensory/motor deficits. Cranial nerves: II-XII intact, grossly non-focal.  Psychiatric: Alert and oriented, thought content appropriate, normal insight  Capillary Refill: Brisk,< 3 seconds   Peripheral Pulses: +2 palpable, equal bilaterally       Labs:  For convenience and continuity at follow-up the following most recent labs are provided:      CBC:    Lab Results   Component Value Date    WBC 12.8 05/03/2022    HGB 9.7 05/03/2022    HCT 28.1 05/03/2022     05/03/2022       Renal:    Lab Results   Component Value Date     05/03/2022    K 4.2 05/03/2022    K 4.1 04/28/2022     05/03/2022    CO2 29 05/03/2022    BUN 22 05/03/2022    CREATININE 0.8 05/03/2022    CALCIUM 8.7 05/03/2022         Significant Diagnostic Studies    Radiology:   XR SHOULDER LEFT 1 VW   Final Result   See above FLUORO FOR SURGICAL PROCEDURES   Final Result      Intraoperative fluoroscopy provided as above. See operative report for details. XR HUMERUS LEFT (MIN 2 VIEWS)   Final Result      Intraoperative fluoroscopy provided as above. See operative report for details. XR SHOULDER LEFT 1 VW   Final Result      Intraoperative fluoroscopy provided as above. See operative report for details. FLUORO FOR SURGICAL PROCEDURES   Final Result      Intraoperative fluoroscopy provided as above. See operative report for details. XR HIP 2-3 VW W PELVIS LEFT   Final Result      1. No acute fracture identified in left hip.   2.  Old left pubic bone fracture. XR CHEST 1 VIEW   Final Result      1. No acute cardiopulmonary findings. 2.  Multiple chronic right rib fractures. 3.  Multiple left rib fractures difficult to age but also may be chronic. 4.  Increased displacement across the left proximal humerus fracture. Persistent anterior dislocation of the left shoulder. 5.  Age-indeterminate T9 and T12 vertebral compression fractures. Correlate with point tenderness. CT SHOULDER LEFT WO CONTRAST   Final Result   Addendum 1 of 1         Final      1. Severely comminuted three-part left proximal humerus fracture. 2.  Anterior glenohumeral dislocation. The humeral head is engaged along the anterior glenoid in the large and deep Hill-Sachs fracture. 3.  Anterior glenoid rim displaced Bankart-type fracture. 4.  Anterior coracoid tip displaced fracture. 5.  Large glenohumeral joint lipohemarthrosis. 6.  Hematoma within the subacromial-subdeltoid bursa. 7.  Hematoma within in the left axilla. IMPRESSION:      1. Severely comminuted report to the left proximal humerus fracture. 2.  Anterior glenohumeral dislocation. The humeral head is engaged along the anterior glenoid in a large and deep deep Hill-Sachs fracture. 3.  Anterior glenoid rim displaced Bankart type fracture   4. Anterior coracoid tip displaced fracture. 5.  Large pineal joint lipohemarthrosis. 6.  Hematoma within the subacromial-subdeltoid bursa. 7.  Hematoma within the right axilla. XR SHOULDER LEFT (MIN 2 VIEWS)   Final Result      1. Anterior dislocation of the left shoulder. 2. Comminuted oblique/transverse fracture of the left humeral head/neck. XR WRIST LEFT (MIN 3 VIEWS)   Final Result      No acute bony pathology identified. Significant degenerative changes as above      XR ELBOW LEFT (2 VIEWS)   Final Result   See above      XR HUMERUS LEFT (MIN 2 VIEWS)   Final Result   See above             Consults:     IP CONSULT TO SOCIAL WORK  IP CONSULT TO SOCIAL WORK  IP CONSULT TO CARDIOLOGY  IP CONSULT TO HOME CARE NEEDS    Disposition:  Home with home care     Condition at Discharge: Stable    Discharge Instructions/Follow-up: PCP, orthopedic surgery. Will need repeat BMP in 1 week to make sure sodium is stabilized. Code Status:  Full Code     Activity: activity as tolerated    Diet: regular diet      Discharge Medications:     Current Discharge Medication List           Details   oxyCODONE (ROXICODONE) 5 MG immediate release tablet Take 1 tablet by mouth every 6 hours as needed for Pain for up to 3 days.   Qty: 12 tablet, Refills: 0    Comments: Reduce doses taken as pain becomes manageable  Associated Diagnoses: Left supracondylar humerus fracture, closed, initial encounter; Humerus head fracture, left, with delayed healing, subsequent encounter              Details   metoprolol succinate (TOPROL XL) 100 MG extended release tablet       amLODIPine (NORVASC) 5 MG tablet       rosuvastatin (CRESTOR) 40 MG tablet       lisinopril (PRINIVIL;ZESTRIL) 40 MG tablet Take 40 mg by mouth daily      levothyroxine (SYNTHROID) 75 MCG tablet Take 88 mcg by mouth Daily              Time Spent on discharge is more than 20 minutes in the examination, evaluation, counseling and review of medications and discharge plan. Signed:    Cedric Rivera MD   5/3/2022      Thank you Marcella Anaya for the opportunity to be involved in this patient's care. If you have any questions or concerns please feel free to contact me at [phone].     This chart was likely completed using voice recognition technology and may contain unintended grammatical , phraseology,and/or punctuation errors

## 2022-05-03 NOTE — PROGRESS NOTES
Patient being discharged home. All belongings sent with patient. AVS explained and pt verbalized understanding with no further questions. Script handed to daughter. PIV removed per policy. Patient transported down to lobby via wheelchair without complications.

## 2022-05-03 NOTE — CARE COORDINATION
[facility]     Patient aware and agreeable to services.  Faxed orders to [facility] acute hospital for [facility] by 5/5    Sandy Sanders LPN  Care Transition Nurse  [address]  [phone]

## 2022-05-03 NOTE — PROGRESS NOTES
Patient alert and orient times 4. Patient up to bathroom with the assist of one and GB. Patient tolerated well. Fall precautions in place and call light within reach.

## 2022-05-03 NOTE — PROGRESS NOTES
Occupational Therapy  Facility/Department: [facility] 5T ORTHO/NEURO  Daily Treatment Note  NAME: Stella Ferrari  : 1940  MRN: [id number]    Date of Service: 5/3/2022    Discharge Recommendations: Stella Ferrari scored a 21/24 on the AM-PAC ADL Inpatient form. Current research shows that an AM-PAC score of 18 or greater is typically associated with a discharge to the patient's home setting. Based on the patient's AM-PAC score, and their current ADL deficits, it is recommended that the patient have 2-3 sessions per week of Occupational Therapy at d/c to increase the patient's independence. At this time, this patient demonstrates the endurance and safety to discharge home with Home health OT services and a follow up treatment frequency of 2-3x/wk. Please see assessment section for further patient specific details. If patient discharges prior to next session this note will serve as a discharge summary. Please see below for the latest assessment towards goals. Home with nursing aide,Home with Home health OT         Patient Diagnosis(es): The primary encounter diagnosis was Anterior shoulder dislocation, left, initial encounter. Diagnoses of Humeral head fracture, left, closed, initial encounter, Elbow laceration, left, initial encounter, Fall, initial encounter, Leukocytosis, unspecified type, Left supracondylar humerus fracture, closed, initial encounter, and Humerus head fracture, left, with delayed healing, subsequent encounter were also pertinent to this visit. Assessment    Assessment: Pt tolerated treatment well completing all LE ADLs w spvn. Pt required Min A to don shirt and Mod A to don shoulder immobilizer +VCs. Daughter reports that she will recieve assistance when donning shoulder immobilizer upon return home. Pt w no concerns for d/c and will return home and will receive 24 hour care from family and home health OT.    Activity Tolerance: Patient tolerated treatment well Plan   Plan  Times per Week: 7  Times per Day: Daily  Current Treatment Recommendations: Equipment evaluation, education, & procurement;Patient/Caregiver education & training; Safety education & training;Functional mobility training;Self-Care / ADL;ROM;Endurance training     Restrictions  Position Activity Restriction  Other position/activity restrictions: NWB L UE; Shoulder immobilizer: May remove for physical therapy, dressing change and hygiene under supervision of a nurse, physician or physical therapy. Subjective   Subjective  Subjective: Pt seated in bedside recliner w RN and daughter present upon OT arrival. Pt pleasant and agreeable to OT session. Pain: Pt reported some pain but did not rate  Cognition  Overall Cognitive Status: WFL        Objective    Vitals     Transfer Training  Transfer Training: Yes  Overall Level of Assistance: Supervision  Sit to Stand: Supervision (from bedside recliner. Pt req VC for safety as pt impulsive with standing without therapist)  Stand to Sit: Supervision (to bedside recliner.)  Toilet Transfer: Supervision (ambuatory toilet transfer onto standard toilet w use of R side GBs.)     ADL  UE Dressing: Moderate assistance  UE Dressing Skilled Clinical Factors: Pt donned shirt w Min A to thread LUE and use of BUEs to button shirt. Antcipate improved performance as daughter very willing to help and threaded RUE. Pt donned shoulder immobilizer w Mod A to place R shoulder strap and to stabalize clips. Pt required Min-Mod+Max VC for correct application of shoulder immobilizer. LE Dressing: Supervision  LE Dressing Skilled Clinical Factors: threaded pants w spvn. Managed pants over hips in stance w spvn. VC given to pull pants over L hip. Pt doffed socks w spvn. Pt donned socks and shoes w setup and spvn. Toileting: Supervision  Toileting Skilled Clinical Factors: Pt completed toileting but was unable to have BM. Pt managed clothing off/on hips in stance w spvn.  Pt completed posterior sharita hygiene seated w spvn. Safety Devices  Type of Devices: Left in chair;Chair alarm in place;Call light within reach;Nurse notified;Gait belt     Patient Education  Education Given To: Patient  Education Provided: Role of Therapy;Plan of Care;Home Exercise Program;Precautions; ADL Adaptive Strategies;Transfer Training  Education Provided Comments: Pt and daughter educated on application and removal of shoulder immobilizer. Pt and daughter verb and demo understanding.   Education Method: Verbal;Demonstration  Barriers to Learning: None  Education Outcome: Verbalized understanding;Continued education needed;Demonstrated understanding    Goals  Short Term Goals  Time Frame for Short term goals: at d/c  Short Term Goal 1: Stance x 8 mins with supervision for ADLs/ IADLs-goal not adressed  Short Term Goal 2: Pt/caregiver demo Don/ doff immobilizer with supervision- goal not met  Short Term Goal 3: LE Dressing with setup /modified tech- gol not met  Patient Goals   Patient goals : Go home and take care of myself       Therapy Time   Individual Concurrent Group Co-treatment   Time In 1323         Time Out 1410         Minutes 47         Timed Code Treatment Minutes: [address]/FRANCISCO

## 2022-05-03 NOTE — TELEPHONE ENCOUNTER
General Question     Subject: DAUGHTER WOULD LIKE TO KNOW IF SHE IS SUPPOSED TO CHANGE THE BANDAGE/DRESSING. THE DISCHARGE INSTRUCTIONS AND NURSING INSTRUCTIONS ARE DIFFERENT. PLEASE ADVISE.  THIS WOULD APPLY FOR BOTH SHOULDER AND ELBOW.       Patient:  Greg Morelos  Contact Number: [phone]

## 2022-05-03 NOTE — PLAN OF CARE
Problem: Discharge Planning  Goal: Discharge to home or other facility with appropriate resources  Outcome: Completed     Problem: Pain  Goal: Verbalizes/displays adequate comfort level or baseline comfort level  5/3/2022 1038 by Terry Davalos RN  Outcome: Completed  5/2/2022 2338 by Komal Gomes  Outcome: Progressing     Problem: Safety - Adult  Goal: Free from fall injury  5/3/2022 1038 by Terry Davalos RN  Outcome: Completed  5/2/2022 2338 by Komal Gomes  Outcome: Progressing     Problem: ABCDS Injury Assessment  Goal: Absence of physical injury  5/3/2022 1038 by Terry Davalos RN  Outcome: Completed  5/2/2022 2338 by Komal Gomes  Outcome: Progressing

## 2022-05-03 NOTE — CARE COORDINATION
Case Management Assessment            Discharge Note                    Date / Time of Note: 5/3/2022 11:24 AM                  Discharge Note Completed by: MARLENA Muse    Patient Name: Abelardo Christopher   YOB: 1940  Diagnosis: Fall, initial encounter [W19. XXXA]  Humeral head fracture, left, closed, initial encounter [S42.292A]  Anterior shoulder dislocation, left, initial encounter [S43.015A]  Elbow laceration, left, initial encounter [S51.012A]  Leukocytosis, unspecified type [D72.829]  Left supracondylar humerus fracture, closed, initial encounter [S42.412A]  Humerus head fracture, left, with delayed healing, subsequent encounter [S42.292G]   Date / Time: 4/27/2022  3:16 PM    Current PCP: Nikolai patient: No    Hospitalization in the last 30 days: No    Advance Directives:  Code Status: Full Code  [place] DNR form completed and on chart: No    Financial:  Payor: Alberto Hope / Plan: Raulito Herman / Product Type: *No Product type* /      Pharmacy:  No Pharmacies [facility] medications?:    Assistance provided by Case Management: None at this time    Does patient want to participate in local refill/ meds to beds program?:      M[facility] To [facility] Rules:  1. Can ONLY be done Monday- Friday between 8:30am-5pm  2. Prescription(s) must be in pharmacy by 3pm to be filled same day  3. Copy of patient's insurance/ prescription drug card and patient face sheet must be sent along with the prescription(s)  4. Cost of Rx cannot be added to hospital bill. If financial assistance is needed, please contact unit  or ;  or  CANNOT provide pharmacy voucher for patients co-pays  5.  Patients can then  the prescription on their way out of the hospital at discharge, or pharmacy can deliver to the bedside if staff is available. (payment due at time of pick-up or delivery - cash, check, or card accepted)     Able to afford home medications/ co-pay costs: Yes    ADLS:  Current PT AM-PAC Score: 20 /24  Current OT AM-PAC Score: 17 /24      DISCHARGE Disposition: Home with Home Health Care: General acute hospital     LOC at discharge: Not Applicable  FINESSE Completed: Not Indicated    Notification completed in HENS/PAS?:  Not Applicable    IMM Completed:   Yes, Case management has presented and reviewed IMM letter #2 to the patient and/or family/ POA. Patient and/or family/POA verbalized understanding of their medicare rights and appeal process if needed. Patient and/or family/POA has signed, initialed and placed today's date (5/3/22) and time (1200) on IMM letter #2 on the the appropriate lines. Patient and/or family/POA, copy of letter offered and they are aware that this original copy of IMM letter #2 is available prior to discharge from the paper chart on the unit. Electronic documentation has been entered into epic for IMM letter #2 and original paper copy has been added to the paper chart at the nurses station. Transportation:  Transportation PLAN for discharge: family   Mode of Transport: [address] ordered at discharge: [address]:  [facility] Provider: n/a  Equipment obtained during hospitalization: cane    Home Oxygen and Respiratory Equipment:  Oxygen needed at discharge?: No  [address]: Not Applicable    Additional CM Notes:  Pt to discharge home with Amy Palma is following. Daughter is at bedside and will transport pt home. The Plan for Transition of Care is related to the following treatment goals of Fall, initial encounter [W19. XXXA]  Humeral head fracture, left, closed, initial encounter [S42.292A]  Anterior shoulder dislocation, left, initial encounter [W33.745Q]  Elbow laceration, left, initial encounter [S51.012A]  Leukocytosis, unspecified type [D72.829]  Left supracondylar humerus fracture, closed, initial encounter [K77.222L]  Humerus head fracture, left, with delayed healing, subsequent encounter [S46.482G]    The Patient and/or patient representative Sahara Nguyen and her family were provided with a choice of provider and agrees with the discharge plan Yes    Freedom of choice list was provided with basic dialogue that supports the patient's individualized plan of care/goals and shares the quality data associated with the providers.  Yes    Care Transitions patient: No    MARLENA Villasenor  [facility]  Case Management Department  Ph: [phone]  Fax: [phone]

## 2022-05-04 ENCOUNTER — TELEPHONE (OUTPATIENT)
Dept: ORTHOPEDIC SURGERY | Age: 82
End: 2022-05-04

## 2022-05-11 ENCOUNTER — OFFICE VISIT (OUTPATIENT)
Dept: ORTHOPEDIC SURGERY | Age: 82
End: 2022-05-11
Payer: MEDICARE

## 2022-05-11 VITALS — BODY MASS INDEX: 19.14 KG/M2 | WEIGHT: 108 LBS | HEIGHT: 63 IN

## 2022-05-11 DIAGNOSIS — Z96.612 STATUS POST REVERSE TOTAL REPLACEMENT OF LEFT SHOULDER: Primary | ICD-10-CM

## 2022-05-11 PROCEDURE — L3670 SO ACRO/CLAV CAN WEB PRE OTS: HCPCS | Performed by: ORTHOPAEDIC SURGERY

## 2022-05-11 PROCEDURE — 99024 POSTOP FOLLOW-UP VISIT: CPT | Performed by: ORTHOPAEDIC SURGERY

## 2022-05-11 RX ORDER — LEVOTHYROXINE SODIUM 88 UG/1
TABLET ORAL
COMMUNITY
Start: 2022-05-10

## 2022-05-11 RX ORDER — LANOLIN ALCOHOL/MO/W.PET/CERES
1 CREAM (GRAM) TOPICAL
COMMUNITY

## 2022-05-11 RX ORDER — FERROUS SULFATE 324(65)MG
324 TABLET, DELAYED RELEASE (ENTERIC COATED) ORAL
COMMUNITY

## 2022-05-11 NOTE — PROGRESS NOTES
History of Present Illness:  Jonnathan Sebastian is a pleasant 78 y.o. female who presents for a post operative visit. She is 2 weeks out following a left reverse total shoulder replacement for a four-part fracture dislocation on 4/27/22. Overall She is doing okay and feels that their pain is well controlled with current pain medications. She has been compliant with wearing the UltraSling brace at all times. She is convalescing at home. She denies fevers, chills, numbness, tingling, and shortness of breath. She is accompanied today by her daughter, who works at [employer] in [place] and is visiting for a short time to be with her mother. Medical History:  Patient's medications, allergies, past medical, surgical, social and family histories were reviewed and updated as appropriate. No notes on file    Review of Systems  A 14 point review of systems was completed by the patient and is available in the media section of the scanned medical record and was reviewed on 5/11/2022. Vital Signs: There were no vitals filed for this visit. General/Appearance: Alert and oriented and in no apparent distress. Skin:  There are no skin lesions, cellulitis, or extreme edema. The patient has warm and well-perfused Bilateral upper extremities with brisk capillary refill. Left Shoulder Exam:    Inspection: Shoulder incision is clean, dry and intact and well approximated. The Prineo dressing is still in place. Mild ecchymosis and swelling are present as can be expected. There is no erythema, drainage or other signs of infection    Palpation:  No crepitus to gentle motion    Active Range of Motion: Deferred    Passive Range of Motion:  Deferred    Strength:  Deferred    Special Tests:  Deferred.     Neurovascular: Sensation to light touch is intact, no motor deficits, palpable radial pulses 2+    Radiology:     Plain radiographs of the left shoulder comprising 3 views: AP in and out, Axillary lateral were obtained and reviewed in the office: Shows postsurgical changes from the reverse total shoulder replacement. All the components are in good placement without any signs of loosening, fractures, subluxations or dislocations. Impression: Stable postop x-ray. Assessment :  Ms. Sorin Mckinnon is a pleasant 78 y.o. patient who is 2 weeks out following a left reverse total shoulder replacement for a four-part fracture dislocation on 4/27/22. Impression:  Encounter Diagnosis   Name Primary?  Status post reverse total replacement of left shoulder Yes       Office Procedures:  Orders Placed This Encounter   Procedures    XR SHOULDER LEFT (MIN 2 VIEWS)     Standing Status:   Future     Number of Occurrences:   1     Standing Expiration Date:   5/11/2023     Order Specific Question:   Reason for exam:     Answer:   POST OP       Treatment Plan:    Overall Sorin Mckinnon is doing well. Her shoulder pain is well-controlled. We recommend that She wear the UltraSling brace at all times with the exception of clothing, bathing and physical therapy through 3 weeks postop The patient was told that she is restricted from driving for at least 3 weeks postop. We will order home health for the next several weeks, however we will have her transition to outpatient physical therapy around 4 weeks postop. All of her questions were fully answered today. We would like to see Sorin Mckinnon back in 2 weeks for follow-up visit. Sorin Mckinnon is in agreement with this plan. 5/11/2022  3:38 PM    Hallie Maldonado, ATC  [employer] [address]    During this examination, Sima HAGAN, functioned as a scribe for Dr. Gera Waterman. The history taking and physical examination were performed by Dr. Katharina Sarkar. All counseling during the appointment was performed between the patient and Dr. Katharina Sarkar.  5/11/2022  ______________________    BENTLEY Dr. Meseret Hurst, personally performed the services described in this documentation as described by Dalton Amanda ATC in my presence, and it is both accurate and complete. Dany Chapa MD, PhD  5/11/2022

## 2022-05-11 NOTE — LETTER
Shoulder Elbow Rehabilitation Referral    Patient Name: Tereso Sever      YOB: 1940    Diagnosis:   1. Status post reverse total replacement of left shoulder        Precautions: Subscap, NWB    Post Op Instructions:  [] Continuous passive motion (CPM)  [x] Pass Elbow range of motion  [] Exercise in plane of scapula   []  Strengthening     [] Pulley and instruction    [x] Home exercise program (copy to patient)   [] Sling when arm at risk  [x] Sling or brace at all times   [] AAROM: Forward elevation to 90            [] AAROM: External rotation to 0    [] Isometric external rotator strengthening [] AAROM: internal rotation: up the back  [x] Isometric abductor strengthening  [] AAROM: Internal abduction     [] Isometric internal rotator strengthening [] AAROM: cross-body adduction             Stretching:     Strengthening:  [] Four quadrant (FE, ER, IR, CBA)  [] Rotator cuff (ER, IR, Abd)  [] Forward Elevation    [] External Rotators     [] External Rotation    [] Internal Rotators  [] Internal Rotation: up/back   [] Abductors     [] Internal Rotation: supine in abduction  [] Flexors  [] Cross-body abduction    [] Extensors  [x] Pendulum (FE, Abd/Add, cw/ccw)  [x] Scapular Stabilizers   [] Wall-walking (FE, Abd)    [x] Shoulder shrugs     [] Table slides      [x] Rhomboid pinch  [] Elbow (flex, ext, pron, sup)    [] Lat.  Pull downs     [] Medial epicondylitis program    [] Forward punch   [] Lateral epicondylitis program    [] Internal rotators     [] Progressive resistive exercises  [] Bench Press        [] Bench press plus  Activities:     [] Lateral pull-downs  [] Rowing     [] Progressive two-hand supine press  [] Stepper/Exercise bike   [] Biceps: curls/supination  [] Swimming  [] Water exercises    Modalities: PRN    Return to Sport:  [] Ultrasound     [] Plyometrics  [] Iontophoresis     [] Rhythmic stabilization  [] Moist heat     [] Core strengthening   [] Massage     [] Sports specific program:   [x] Cryotherapy      [] Electrical stimulation     [] Paraffin  [] Whirlpool  [] TENS    [x] Home exercise program (copy to patient). Perform exercises for:   15     minutes    2-3      times/day  [x] Supervised physical therapy  Frequency: []  1x week  [x] 2x week  [] 3x week  [] Other:   Duration: [] 2 weeks   [] 4 weeks  [x] 6 weeks  [] Other:     Additional Instructions:           Dany Cox MD, PhD

## 2022-05-19 ENCOUNTER — HOSPITAL ENCOUNTER (OUTPATIENT)
Dept: PHYSICAL THERAPY | Age: 82
Setting detail: THERAPIES SERIES
Discharge: HOME | End: 2022-05-19
Payer: MEDICARE

## 2022-05-19 PROCEDURE — 97161 PT EVAL LOW COMPLEX 20 MIN: CPT | Performed by: PHYSICAL THERAPIST

## 2022-05-19 PROCEDURE — 97110 THERAPEUTIC EXERCISES: CPT | Performed by: PHYSICAL THERAPIST

## 2022-05-19 NOTE — FLOWSHEET NOTE
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, s/p fall  · Treatment Diagnosis: L shoulder pain I81.462  Insurance/Certification information:  PT Insurance Information: [employer] [address], BMN, $40 cp  Physician Information:   Dr Janice Hernandez  Has the plan of care been signed (Y/N):        []  Yes  [x]  No     Date of Patient follow up with Physician: 22      Is this a Progress Report:     []  Yes  [x]  No        If Yes:  Date Range for reporting period:  Beginnin22  Ending:     Progress report will be due (10 Rx or 30 days whichever is less):        Recertification will be due (POC Duration  / 90 days whichever is less): 22      Visit # Insurance Allowable Auth Required   In-person 1 Cohere auth []  Yes []  No    Telehealth   []  Yes []  No    Total            Functional Scale: FOTO shoulder     Date assessed:  22      Therapy Diagnosis/Practice Pattern:I, surgical      Number of Comorbidities:  []0     [x]1-2    []3+    Latex Allergy:  []NO      [x]YES  Preferred Language for Healthcare:   [x]English       []other:      Pain level: eval 1/10     SUBJECTIVE:  See eval    OBJECTIVE: See eval   Observation:    Test measurements:      RESTRICTIONS/PRECAUTIONS: see letter tab, no shoulder PROM until MD clearance next appt 22, Latex Allergy    Exercises/Interventions:     Therapeutic Ex (76116) Sets/sec/Reps Notes/CUES   Shoulder shrugs, retractions x10 ea HEP   iso abd 5\" x10 HEP   Self PROM elbow flex/ext x10 HEP   pendulum Lat swings, 3x15\" HEP, relax arm cues   gripping reviewed HEP Pt/daughter ed: sling use, post-op precautions and progression expectations, icing, posture x10'    Manual Intervention (85306)                                   NMR re-education (32604)  CUES NEEDED                                                Therapeutic Activity (02498)                                     Therapeutic Exercise and NMR EXR  [x] (19671) Provided verbal/tactile cueing for activities related to strengthening, flexibility, endurance, ROM  for improvements in scapular, scapulothoracic and UE control with self care, reaching, carrying, lifting, house/yardwork, driving/computer work.    [] (67984) Provided verbal/tactile cueing for activities related to improving balance, coordination, kinesthetic sense, posture, motor skill, proprioception  to assist with  scapular, scapulothoracic and UE control with self care, reaching, carrying, lifting, house/yardwork, driving/computer work. Therapeutic Activities:    [] (62944 or 14358) Provided verbal/tactile cueing for activities related to improving balance, coordination, kinesthetic sense, posture, motor skill, proprioception and motor activation to allow for proper function of scapular, scapulothoracic and UE control with self care, carrying, lifting, driving/computer work.      Home Exercise Program:    [x] (26320) Reviewed/Progressed HEP activities related to strengthening, flexibility, endurance, ROM of scapular, scapulothoracic and UE control with self care, reaching, carrying, lifting, house/yardwork, driving/computer work  [] (67709) Reviewed/Progressed HEP activities related to improving balance, coordination, kinesthetic sense, posture, motor skill, proprioception of scapular, scapulothoracic and UE control with self care, reaching, carrying, lifting, house/yardwork, driving/computer work      Manual Treatments:  PROM / STM / Oscillations-Mobs:  G-I, II, III, IV (PA's, Inf., Post.)  [] (31557) Provided manual therapy to mobilize soft tissue/joints of cervical/CT, scapular GHJ and UE for the purpose of modulating pain, promoting relaxation,  increasing ROM, reducing/eliminating soft tissue swelling/inflammation/restriction, improving soft tissue extensibility and allowing for proper ROM for normal function with self care, reaching, carrying, lifting, house/yardwork, driving/computer work    Modalities:  declined    Charges  Timed Code Treatment Minutes: 25   Total Treatment Minutes: 45     [x] EVAL (LOW) 01749   [] EVAL (MOD) 85205   [] EVAL (HIGH) 04786   [] RE-EVAL     [x] HW(20169) x 2    [] IONTO  [] NMR (46413) x     [] VASO  [] Manual (85560) x      [] Other:  [] TA x      [] Mech Traction (82864)  [] ES(attended) (16310)      [] ES (un) (17014):     GOALS:  Patient stated goal: full use of her L UE  [] Progressing: [] Met: [] Not Met: [] Adjusted    Therapist goals for Patient:   Short Term Goals: To be achieved in: 2 weeks  1. Independent in HEP and progression per patient tolerance, in order to prevent re-injury. [] Progressing: [] Met: [] Not Met: [] Adjusted  2. Patient will have a decrease in pain to facilitate improvement in movement, function, and ADLs as indicated by Functional Deficits. [] Progressing: [] Met: [] Not Met: [] Adjusted    Long Term Goals: To be achieved in: 16 weeks  1. Disability index score of 58% or better for the FOTO shoulder to assist with reaching prior level of function. [] Progressing: [] Met: [] Not Met: [] Adjusted  2. Patient will demonstrate increased AROM to 150 deg elevation, IR to L5, ER to C5 to allow for proper joint functioning with dressing and ADLs. [] Progressing: [] Met: [] Not Met: [] Adjusted  3. Patient will demonstrate an increase in Strength to grossly 4/5 L UE to allow for proper functional mobility with carrying groceries and laundry basket. [] Progressing: [] Met: [] Not Met: [] Adjusted  4.  Patient will return to over head reaching to put her dishes away functional activities without increased symptoms or restriction. [] Progressing: [] Met: [] Not Met: [] Adjusted     Progression Towards Functional goals:  [] Patient is progressing as expected towards functional goals listed. [] Progression is slowed due to complexities listed. [] Progression has been slowed due to co-morbidities. [x] Plan just implemented, too soon to assess goals progression  [] Other:     ASSESSMENT:  See harry    Overall Progression Towards Functional goals/ Treatment Progress Update:  [] Patient is progressing as expected towards functional goals listed. [] Progression is slowed due to complexities/Impairments listed. [] Progression has been slowed due to co-morbidities. [x] Plan just implemented, too soon to assess goals progression <30days   [] Goals require adjustment due to lack of progress  [] Patient is not progressing as expected and requires additional follow up with physician  [] Other    Prognosis for POC: [x] Good [] Fair  [] Poor      Patient requires continued skilled intervention: [x] Yes  [] No    Treatment/Activity Tolerance:  [x] Patient able to complete treatment  [] Patient limited by fatigue  [] Patient limited by pain    [] Patient limited by other medical complications  [] Other:     PLAN: See harry pt will be transferring to the [facility] office due to closer proximity to her home  [] Continue per plan of care [] Alter current plan (see comments above)  [x] Plan of care initiated [] Hold pending MD visit [] Discharge      Electronically signed by:  Ryan Olivares PT    Note: If patient does not return for scheduled/ recommended follow up visits, this note will serve as a discharge from care along with most recent update on progress. HEP instruction:   Access Code: [email or web address]  URL: [email or web address]  Date: 05/19/2022  Prepared by: Ryan Olivares     Exercises  Seated Scapular Retraction - 1-2 x daily - 7 x weekly - 1-2 sets - 10 reps  Seated Shoulder Cradle Shrug - 1-2 x daily - 7 x weekly - 1-2 sets - 10 reps  Isometric Shoulder Abduction at Wall - 1 x daily - 7 x weekly - 1-2 sets - 5-10 reps - 5 seconds hold  Elbow Flexion PROM - 1-2 x daily - 7 x weekly - 1-2 sets - 10 reps  Horizontal Shoulder Pendulum with Table Support - 1-2 x daily - 7 x weekly - 3 sets - 15-20 seconds hold  Seated Gripping Towel - 1-2 x daily - 7 x weekly - 2 sets - 10 reps

## 2022-05-19 NOTE — PLAN OF CARE
[facility] and [address], [address]  Phone: [phone]  Fax [phone]     Physical Therapy Certification    Dear  Dr Annetta Craig,    We had the pleasure of evaluating the following patient for physical therapy services at [address] [place] [address]. A summary of our findings can be found in the initial assessment below. This includes our plan of care. If you have any questions or concerns regarding these findings, please do not hesitate to contact me at the office phone number checked above. Thank you for the referral.       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   [] Yes, Patient intake triggered further evaluation      [] C-SSRS Screening completed  [] PCP notified via Plan of Care  [] Emergency services notified     Latex Allergy:  []NO      [x]YES  Preferred Language for Healthcare:   [x]English       []other:    SUBJECTIVE: Patient stated complaint: DOI was 22 when patient fell on to her L arm. She sustained a deep L elbow laceration and 4 part fracture/dislocation to the L humerus.   First surgery was on 4/27/22 to attempt to relocate the shoulder and clean out the elbow wound. The fracture was deemed unstable for ORIF and she underwent rTSA on 4/29/22. She did have 1 visit home health PT and a couple OT, but mostly for functional mobility.   Hospital HEP--gripping, PROM elbow flex    Relevant Medical History:OA, hx skin cancer, htn, thyroid, TIA--1x a year ago, osteoporosis, scoliosis  Functional Disability Index: FOTO 30/100    Pain Scale: 1/10  Easing factors: rest, sling use  Provocative factors: L UE use     Type: []Constant   [x]Intermittent  []Radiating []Localized []other:     Numbness/Tingling: denies    Occupation/School: retired    Living Status/Prior Level of Function: Independent with ADLs and IADLs, pt lives alone, daughter is staying with her short term until tomorrow and then another family member will be in for the following week, prior to injury pt did exercises 3 or more days/week including [employer]      OBJECTIVE:     CERV ROM     Cervical Flexion     Cervical Extension     Cervical SB     Cervical rotation          ROM Left Right--AROM   Shoulder Flex NT  153   Shoulder Abd Due to     Shoulder ER Post-op  T3   Shoulder IR restrictions T4   Elbow flexion PROM finger tips to shoulder Finger tips to shoulder   Elbow extension PROM lacking ~15 deg 0        Strength  Left Right   Shoulder Flex NT  4+   Shoulder Scap Due to 5   Shoulder ER Post-op 4+   Shoulder IR restrictions 5   Mid trap     Low trap     Rhomboids     Biceps  4+   Triceps  4+          Reflexes/Sensation: NT   []Dermatomes/Myotomes intact    []Reflexes equal and normal bilaterally   []Other:    Joint mobility: NT   []Normal    []Hypo   []Hyper    Palpation: minimal tenderness reported    Functional Mobility/Transfers: indep    Posture: arm in UltraSling, R curve scoliosis noted, B rounded shoulders    Bandages/Dressings/Incisions: elbow incision covered, dried blood noted, no redness or s/s infection surrounding, fading bruising to forearm, shoulder incision covered with post-op mesh glue, no drainage or s/s infection, no bruising    Gait: (include devices/WB status): WNL w/o AD use    Orthopedic Special Tests: none                       [x] Patient history, allergies, meds reviewed. Medical chart reviewed. See intake form. Review Of Systems (ROS):  [x]Performed Review of systems (Integumentary, CardioPulmonary, Neurological) by intake and observation. Intake form has been scanned into medical record. Patient has been instructed to contact their primary care physician regarding ROS issues if not already being addressed at this time.       Co-morbidities/Complexities (which will affect course of rehabilitation):   []None           Arthritic conditions   []Rheumatoid arthritis (M05.9)  [x]Osteoarthritis (M19.91)   Cardiovascular conditions   [x]Hypertension (I10)  []Hyperlipidemia (E78.5)  []Angina pectoris (I20)  []Atherosclerosis (I70)   Musculoskeletal conditions   []Disc pathology   []Congenital spine pathologies   []Prior surgical intervention  [x]Osteoporosis (M81.8)  []Osteopenia (M85.8)   Endocrine conditions   []Hypothyroid (E03.9)  []Hyperthyroid Gastrointestinal conditions   []Constipation (A13.31)   Metabolic conditions   []Morbid obesity (E66.01)  []Diabetes type 1(E10.65) or 2 (E11.65)   []Neuropathy (G60.9)     Pulmonary conditions   []Asthma (J45)  []Coughing   []COPD (J44.9)   Psychological Disorders  [x]Anxiety (F41.9)  []Depression (F32.9)   []Other:   []Other:     Scoliosis, TIA 1 year ago     Barriers to/and or personal factors that will affect rehab potential:              [x]Age  []Sex              []Motivation/Lack of Motivation                        []Co-Morbidities              []Cognitive Function, education/learning barriers              []Environmental, home barriers              []profession/work barriers  []past PT/medical experience  []other:  Justification:      Falls Risk Assessment (30 days):   [x] Falls Risk assessed and no intervention required. [] Falls Risk assessed and Patient requires intervention due to being higher risk   TUG score (>12s at risk):     [] Falls education provided, including       G-Codes:       ASSESSMENT:   Functional Impairments   []Noted spinal or UE joint hypomobility   []Noted spinal or UE joint hypermobility   [x]Decreased UE functional ROM   [x]Decreased UE functional strength   []Abnormal reflexes/sensation/myotomal/dermatomal deficits   [x]Decreased RC/scapular/core strength and neuromuscular control   []other:      Functional Activity Limitations (from functional questionnaire and intake)   [x]Reduced ability to tolerate prolonged functional positions   [x]Reduced ability or difficulty with changes of positions or transfers between positions   [x]Reduced ability to maintain good posture and demonstrate good body mechanics with sitting, bending, and lifting   [x] Reduced ability or tolerance with driving and/or computer work   [x]Reduced ability to sleep   [x]Reduced ability to perform lifting, reaching, carrying tasks   [x]Reduced ability to tolerate impact through UE   [x]Reduced ability to reach behind back   [x]Reduced ability to  or hold objects   [x]Reduced ability to throw or toss an object   []other:    Participation Restrictions   [x]Reduced participation in self care activities   [x]Reduced participation in home management activities   []Reduced participation in work activities   [x]Reduced participation in social activities. []Reduced participation in sport/recreation activities. Classification:   [x]Signs/symptoms consistent with post-surgical status including decreased ROM, strength and function.   []Signs/symptoms consistent with joint sprain/strain   []Signs/symptoms consistent with shoulder impingement   []Signs/symptoms consistent with shoulder/elbow/wrist tendinopathy   []Signs/symptoms consistent with Rotator cuff tear   []Signs/symptoms consistent with labral tear   []Signs/symptoms consistent with postural dysfunction    []Signs/symptoms consistent with Glenohumeral IR Deficit - <45 degrees   []Signs/symptoms consistent with facet dysfunction of cervical/thoracic spine    []Signs/symptoms consistent with pathology which may benefit from Dry needling     []other:     Prognosis/Rehab Potential:      []Excellent   [x]Good    []Fair   []Poor    Tolerance of evaluation/treatment:    []Excellent   [x]Good    []Fair   []Poor  Physical Therapy Evaluation Complexity Justification  [x] A history of present problem with:  [] no personal factors and/or comorbidities that impact the plan of care;  [x]1-2 personal factors and/or comorbidities that impact the plan of care  []3 personal factors and/or comorbidities that impact the plan of care  [x] An examination of body systems using standardized tests and measures addressing any of the following: body structures and functions (impairments), activity limitations, and/or participation restrictions;:  [x] a total of 1-2 or more elements   [] a total of 3 or more elements   [] a total of 4 or more elements   [x] A clinical presentation with:  [x] stable and/or uncomplicated characteristics   [] evolving clinical presentation with changing characteristics  [] unstable and unpredictable characteristics;   [x] Clinical decision making of [x] low, [] moderate, [] high complexity using standardized patient assessment instrument and/or measurable assessment of functional outcome.     [x] EVAL (LOW) 80418 (typically 20 minutes face-to-face)  [] EVAL (MOD) 58374 (typically 30 minutes face-to-face)  [] EVAL (HIGH) 95253 (typically 45 minutes face-to-face)  [] RE-EVAL       PLAN:  Frequency/Duration:  1-2 days per week for 16 Weeks:  INTERVENTIONS:  [x] Therapeutic exercise including: strength training, ROM, for Upper extremity and core   [x]  NMR activation and proprioception for UE, scap and Core   [x] Manual therapy as indicated for shoulder, scapula and spine to include: Dry Needling/IASTM, STM, PROM, Gr I-IV mobilizations, manipulation. [x] Modalities as needed that may include: thermal agents, E-stim, Biofeedback, US, iontophoresis as indicated  [x] Patient education on joint protection, postural re-education, activity modification, progression of HEP. HEP instruction:   Access Code: [email or web address]  URL: [email or web address]  Date: 05/19/2022  Prepared by: Nancy Bidding    Exercises  Seated Scapular Retraction - 1-2 x daily - 7 x weekly - 1-2 sets - 10 reps  Seated Shoulder Cradle Shrug - 1-2 x daily - 7 x weekly - 1-2 sets - 10 reps  Isometric Shoulder Abduction at Wall - 1 x daily - 7 x weekly - 1-2 sets - 5-10 reps - 5 seconds hold  Elbow Flexion PROM - 1-2 x daily - 7 x weekly - 1-2 sets - 10 reps  Horizontal Shoulder Pendulum with Table Support - 1-2 x daily - 7 x weekly - 3 sets - 15-20 seconds hold  Seated Gripping Towel - 1-2 x daily - 7 x weekly - 2 sets - 10 reps    GOALS:  Patient stated goal: full use of her L UE  [] Progressing: [] Met: [] Not Met: [] Adjusted    Therapist goals for Patient:   Short Term Goals: To be achieved in: 2 weeks  1. Independent in HEP and progression per patient tolerance, in order to prevent re-injury. [] Progressing: [] Met: [] Not Met: [] Adjusted  2. Patient will have a decrease in pain to facilitate improvement in movement, function, and ADLs as indicated by Functional Deficits. [] Progressing: [] Met: [] Not Met: [] Adjusted    Long Term Goals: To be achieved in: 16 weeks  1. Disability index score of 58% or better for the FOTO shoulder to assist with reaching prior level of function. [] Progressing: [] Met: [] Not Met: [] Adjusted  2. Patient will demonstrate increased AROM to 150 deg elevation, IR to L5, ER to C5 to allow for proper joint functioning with dressing and ADLs. [] Progressing: [] Met: [] Not Met: [] Adjusted  3.  Patient will demonstrate an increase in Strength to grossly 4/5 L UE to allow for proper functional mobility with carrying groceries and laundry basket. [] Progressing: [] Met: [] Not Met: [] Adjusted  4. Patient will return to over head reaching to put her dishes away functional activities without increased symptoms or restriction.    [] Progressing: [] Met: [] Not Met: [] Adjusted      Electronically signed by:  Kirby Lozoya, PT

## 2022-05-24 ENCOUNTER — TELEPHONE (OUTPATIENT)
Dept: ORTHOPEDIC SURGERY | Age: 82
End: 2022-05-24

## 2022-05-24 ENCOUNTER — HOSPITAL ENCOUNTER (OUTPATIENT)
Dept: PHYSICAL THERAPY | Age: 82
Setting detail: THERAPIES SERIES
Discharge: HOME | End: 2022-05-24
Payer: MEDICARE

## 2022-05-24 PROCEDURE — 97110 THERAPEUTIC EXERCISES: CPT | Performed by: PHYSICAL THERAPIST

## 2022-05-24 PROCEDURE — 97140 MANUAL THERAPY 1/> REGIONS: CPT | Performed by: PHYSICAL THERAPIST

## 2022-05-24 NOTE — TELEPHONE ENCOUNTER
Medical Facility Question     Facility Name: [address]  Name: [address] Number: [phone]  Request or Information: PATIENT HAS BEEN DISCHARGED FROM [facility]. THERE ARE THREE ORDERS IN DR. ROGERS'S NAME THAT NEED TO BE SIGNED AND DATED.

## 2022-05-24 NOTE — FLOWSHEET NOTE
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, s/p fall  · Treatment Diagnosis: L shoulder pain M09.597  Insurance/Certification information:  PT Insurance Information: [employer] [address], [place], $40 cp  Physician Information:   Dr Karla Suero  Has the plan of care been signed (Y/N):        [x]  Yes  []  No     Date of Patient follow up with Physician: 22      Is this a Progress Report:     []  Yes  [x]  No        If Yes:  Date Range for reporting period:  Beginnin22  Ending:     Progress report will be due (10 Rx or 30 days whichever is less):        Recertification will be due (POC Duration  / 90 days whichever is less): 22      Visit # Insurance Allowable Auth Required   In-person 2 Cohere auth []  Yes []  No    Telehealth   []  Yes []  No    Total            Functional Scale: FOTO shoulder     Date assessed:  22      Therapy Diagnosis/Practice Pattern:I, surgical      Number of Comorbidities:  []0     [x]1-2    []3+    Latex Allergy:  []NO      [x]YES  Preferred Language for Healthcare:   [x]English       []other:      Pain level: eval 1/10     SUBJECTIVE:  Pt reports she continues to have very little pain. HEP is going well, but she would like to review, darcy the abd iso and pendulum. See's MD tomorrow.     OBJECTIVE: See eval   Observation:    Test measurements:      RESTRICTIONS/PRECAUTIONS: see letter tab, no shoulder PROM until MD clearance next appt 22, Latex Allergy    Exercises/Interventions:     Therapeutic Ex (56346) Sets/sec/Reps Notes/CUES   Shoulder shrugs, retractions x10 ea HEP, \"clock\" cues 12<->6, 3<->9 helped improve form   iso abd 5\" x10 HEP   Self PROM elbow flex/ext x10 HEP   pendulum Lat swings, 3x20\" HEP, relax arm cues, improved   gripping Red web x20  Chip clip x20 HEP w/ soft ball                                 Pt/r ed: sling use, post-op precautions and progression expectations, icing, posture x10'    Manual Intervention (61724)     STM for edema upper medial arm  PROM elbow flex/ext x12' total Hard area of trapped fluid mid humerus                            NMR re-education (93084)  CUES NEEDED                                                Therapeutic Activity (83816)                                     Therapeutic Exercise and NMR EXR  [x] (04809) Provided verbal/tactile cueing for activities related to strengthening, flexibility, endurance, ROM  for improvements in scapular, scapulothoracic and UE control with self care, reaching, carrying, lifting, house/yardwork, driving/computer work.    [] (52871) Provided verbal/tactile cueing for activities related to improving balance, coordination, kinesthetic sense, posture, motor skill, proprioception  to assist with  scapular, scapulothoracic and UE control with self care, reaching, carrying, lifting, house/yardwork, driving/computer work. Therapeutic Activities:    [] (14130 or 78771) Provided verbal/tactile cueing for activities related to improving balance, coordination, kinesthetic sense, posture, motor skill, proprioception and motor activation to allow for proper function of scapular, scapulothoracic and UE control with self care, carrying, lifting, driving/computer work.      Home Exercise Program:    [x] (90484) Reviewed/Progressed HEP activities related to strengthening, flexibility, endurance, ROM of scapular, scapulothoracic and UE control with self care, reaching, carrying, lifting, house/yardwork, driving/computer work  [] (19738) Reviewed/Progressed HEP activities related to improving balance, coordination, kinesthetic sense, posture, motor skill, proprioception of scapular, scapulothoracic and UE control with self care, reaching, carrying, lifting, house/yardwork, driving/computer work      Manual Treatments:  PROM / STM / Oscillations-Mobs:  G-I, II, III, IV (PA's, Inf., Post.)  [x] (49547) Provided manual therapy to mobilize soft tissue/joints of cervical/CT, scapular GHJ and UE for the purpose of modulating pain, promoting relaxation,  increasing ROM, reducing/eliminating soft tissue swelling/inflammation/restriction, improving soft tissue extensibility and allowing for proper ROM for normal function with self care, reaching, carrying, lifting, house/yardwork, driving/computer work    Modalities:  declined    Charges  Timed Code Treatment Minutes: 40   Total Treatment Minutes: 40     [] EVAL (LOW) 65382   [] EVAL (MOD) 41668   [] EVAL (HIGH) 26435   [] RE-EVAL     [x] VT(20976) x 2    [] IONTO  [] NMR (84174) x     [] VASO  [x] Manual (37305) x 1     [] Other:  [] TA x      [] Mech Traction (53868)  [] ES(attended) (03396)      [] ES (un) (31303):     GOALS:  Patient stated goal: full use of her L UE  [] Progressing: [] Met: [] Not Met: [] Adjusted    Therapist goals for Patient:   Short Term Goals: To be achieved in: 2 weeks  1. Independent in HEP and progression per patient tolerance, in order to prevent re-injury. [] Progressing: [] Met: [] Not Met: [] Adjusted  2. Patient will have a decrease in pain to facilitate improvement in movement, function, and ADLs as indicated by Functional Deficits. [] Progressing: [] Met: [] Not Met: [] Adjusted    Long Term Goals: To be achieved in: 16 weeks  1. Disability index score of 58% or better for the FOTO shoulder to assist with reaching prior level of function. [] Progressing: [] Met: [] Not Met: [] Adjusted  2.  Patient will demonstrate increased AROM to 150 deg elevation, IR to L5, ER to C5 to allow for proper joint functioning with dressing and ADLs. [] Progressing: [] Met: [] Not Met: [] Adjusted  3. Patient will demonstrate an increase in Strength to grossly 4/5 L UE to allow for proper functional mobility with carrying groceries and laundry basket. [] Progressing: [] Met: [] Not Met: [] Adjusted  4. Patient will return to over head reaching to put her dishes away functional activities without increased symptoms or restriction. [] Progressing: [] Met: [] Not Met: [] Adjusted     Progression Towards Functional goals:  [] Patient is progressing as expected towards functional goals listed. [] Progression is slowed due to complexities listed. [] Progression has been slowed due to co-morbidities. [x] Plan just implemented, too soon to assess goals progression  [] Other:     ASSESSMENT:  Reviewed all HEP with cues needed for wall iso's and scap control, lesser so with pendulum. Reviewed precautions, posture and sling use and all questions were answered to patient's satisfaction. Overall Progression Towards Functional goals/ Treatment Progress Update:  [] Patient is progressing as expected towards functional goals listed. [] Progression is slowed due to complexities/Impairments listed. [] Progression has been slowed due to co-morbidities.   [x] Plan just implemented, too soon to assess goals progression <30days   [] Goals require adjustment due to lack of progress  [] Patient is not progressing as expected and requires additional follow up with physician  [] Other    Prognosis for POC: [x] Good [] Fair  [] Poor      Patient requires continued skilled intervention: [x] Yes  [] No    Treatment/Activity Tolerance:  [x] Patient able to complete treatment  [] Patient limited by fatigue  [] Patient limited by pain    [] Patient limited by other medical complications  [] Other:     PLAN: See harry pt will be transferring to the [address] [place] [address] office due to closer proximity to her home, see letters tab for updated MD progression following appt 5/25/22  [x] Continue per plan of care [] Alter current plan (see comments above)  [] Plan of care initiated [] Hold pending MD visit [] Discharge      Electronically signed by:  Molly Bullock PT    Note: If patient does not return for scheduled/ recommended follow up visits, this note will serve as a discharge from care along with most recent update on progress. HEP instruction:   Access Code: [email or web address]  URL: [email or web address]  Date: 05/19/2022  Prepared by: Molly Bullock     Exercises  Seated Scapular Retraction - 1-2 x daily - 7 x weekly - 1-2 sets - 10 reps  Seated Shoulder Cradle Shrug - 1-2 x daily - 7 x weekly - 1-2 sets - 10 reps  Isometric Shoulder Abduction at Wall - 1 x daily - 7 x weekly - 1-2 sets - 5-10 reps - 5 seconds hold  Elbow Flexion PROM - 1-2 x daily - 7 x weekly - 1-2 sets - 10 reps  Horizontal Shoulder Pendulum with Table Support - 1-2 x daily - 7 x weekly - 3 sets - 15-20 seconds hold  Seated Gripping Towel - 1-2 x daily - 7 x weekly - 2 sets - 10 reps

## 2022-05-25 ENCOUNTER — OFFICE VISIT (OUTPATIENT)
Dept: ORTHOPEDIC SURGERY | Age: 82
End: 2022-05-25

## 2022-05-25 VITALS — BODY MASS INDEX: 19.14 KG/M2 | WEIGHT: 108 LBS | HEIGHT: 63 IN

## 2022-05-25 DIAGNOSIS — Z96.612 STATUS POST REVERSE TOTAL REPLACEMENT OF LEFT SHOULDER: Primary | ICD-10-CM

## 2022-05-25 PROCEDURE — 99024 POSTOP FOLLOW-UP VISIT: CPT | Performed by: ORTHOPAEDIC SURGERY

## 2022-05-25 NOTE — LETTER
Shoulder Elbow Rehabilitation Referral    Patient Name: Sorin Mckinnon      YOB: 1940    Diagnosis:   1. Status post reverse total replacement of left shoulder        Precautions: NWB left UE, Subscap    Post Op Instructions:  [] Continuous passive motion (CPM)  [x] active Elbow range of motion  [] Exercise in plane of scapula   []  Strengthening     [] Pulley and instruction    [x] Home exercise program (copy to patient)   [x] Sling when arm at risk  [] Sling or brace at all times   [x] AAROM: Forward elevation to 90            [x] AAROM: External rotation to 0    [] Isometric external rotator strengthening [] AAROM: internal rotation: up the back  [x] Isometric abductor strengthening  [] AAROM: Internal abduction     [] Isometric internal rotator strengthening [] AAROM: cross-body adduction             Stretching:     Strengthening:  [] Four quadrant (FE, ER, IR, CBA)  [] Rotator cuff (ER, IR, Abd)  [] Forward Elevation    [] External Rotators     [] External Rotation    [] Internal Rotators  [] Internal Rotation: up/back   [] Abductors     [] Internal Rotation: supine in abduction  [] Flexors  [] Cross-body abduction    [] Extensors  [x] Pendulum (FE, Abd/Add, cw/ccw)  [x] Scapular Stabilizers   [] Wall-walking (FE, Abd)    [x] Shoulder shrugs     [] Table slides      [x] Rhomboid pinch  [] Elbow (flex, ext, pron, sup)    [] Lat.  Pull downs     [] Medial epicondylitis program    [] Forward punch   [] Lateral epicondylitis program    [] Internal rotators     [] Progressive resistive exercises  [] Bench Press        [] Bench press plus  Activities:     [] Lateral pull-downs  [] Rowing     [] Progressive two-hand supine press  [] Stepper/Exercise bike   [] Biceps: curls/supination  [] Swimming  [] Water exercises    Modalities: PRN    Return to Sport:  [] Ultrasound     [] Plyometrics  [] Iontophoresis     [] Rhythmic stabilization  [] Moist heat     [] Core strengthening   [] Massage     [] Sports specific program:   [x] Cryotherapy      [] Electrical stimulation     [] Paraffin  [] Whirlpool  [] TENS    [x] Home exercise program (copy to patient). Perform exercises for:   15     minutes    2-3      times/day  [x] Supervised physical therapy  Frequency: []  1x week  [x] 2x week  [] 3x week  [] Other:   Duration: [] 2 weeks   [] 4 weeks  [x] 6 weeks  [] Other:     Additional Instructions:   May progress Forward Elevation by 10 degrees weekly up to 140 and External Rotation 5 degrees weekly up to 20          Dany Mcdaniel MD, PhD

## 2022-05-25 NOTE — PROGRESS NOTES
History of Present Illness:  Carlos Mcneill is a pleasant 78 y.o. female who presents for a post operative visit. She is 4 weeks out following a left reverse total shoulder replacement for a four-part fracture dislocation on 4/27/22. Overall She is doing okay and feels that their pain is well controlled with current pain medications. She has been compliant with wearing the UltraSling brace at all times. She has continued in physical therapy at the [facility] but plans to transfer the [facility] office next week. She denies fevers, chills, numbness, tingling, and shortness of breath. She is convalescing at home and has had family come in town to take care of her. Of note, she underwent a left elbow I&D by Dr. Beba Conley which was sustained as a result of her initial fall. Medical History:  Patient's medications, allergies, past medical, surgical, social and family histories were reviewed and updated as appropriate. No notes on file    Review of Systems  A 14 point review of systems was completed by the patient on 5/11/22 and is available in the media section of the scanned medical record and was reviewed on 5/25/2022. Vital Signs:  Vitals:       General/Appearance: Alert and oriented and in no apparent distress. Skin:  There are no skin lesions, cellulitis, or extreme edema. The patient has warm and well-perfused Bilateral upper extremities with brisk capillary refill. Left Shoulder Exam:    Inspection: Shoulder incision is clean, dry and intact and well approximated. Mild ecchymosis and swelling are present as can be expected. There is no erythema, drainage or other signs of infection. Sutures at the left elbow are intact. No signs of redness or infection.     Palpation:  No crepitus to gentle motion    Active Assisted Range of Motion: Deferred    Passive Range of Motion:  Tolerates gentle passive motion     Strength:  Deferred    Special Tests:  Deferred    Neurovascular: Sensation to light touch is intact, no motor deficits, palpable radial pulses 2+    Radiology:     Plain radiographs of the left shoulder comprising 3 views: AP in and out, Axillary lateral were obtained and reviewed in the office: Shows postsurgical changes from the reverse total shoulder replacement. All the components are in good placement without any signs of loosening, fractures, subluxations or dislocations.     Impression: Stable postop x-ray. Assessment :  Ms. Mahsa Mcleod is a pleasant 78 y.o. patient who is now 4 weeks out following a left reverse total shoulder replacement for a four-part fracture dislocation on 4/27/22. She is doing well. Impression:  Encounter Diagnosis   Name Primary?  Status post reverse total replacement of left shoulder Yes       Office Procedures:  Orders Placed This Encounter   Procedures    XR SHOULDER LEFT (MIN 2 VIEWS)     Standing Status:   Future     Number of Occurrences:   1     Standing Expiration Date:   5/25/2023     Order Specific Question:   Reason for exam:     Answer:   pain    [facility]     Referral Priority:   Routine     Referral Type:   Eval and Treat     Requested Specialty:   Physical Therapist     Number of Visits Requested:   1       Treatment Plan:    Overall Mahsa Mcleod is doing well. She may begin to wean out of the Ultrasling brace when at home, but should continue to wear it when at risk. She may begin to drive but must remove the sling when doing so. Additionally, She must be off of pain medications during the day when driving. We recommend She continue in physical therapy. We will progress her therapy at this time. The sutures at the elbow were removed today without complication. All of her questions were fully answered today. We would like to see Mahsa Mcleod back in 3 weeks for follow-up visit.     5/25/2022  1:56 PM    Alley Gandhi ATC   [address]    During this examination, I, Shabnam Antonio, functioned as a scribe for Dr. Moustapha James. The history taking and physical examination were performed by Dr. Rosalinda Gayle. All counseling during the appointment was performed between the patient and Dr. Rosalinda Gayle. 5/25/2022  _________________  I, Dr. Moustapha James, personally performed the services described in this documentation as described by Judith Ramesh ATC in my presence, and it is both accurate and complete. Dany Gayle MD, PhD  5/25/2022

## 2022-06-01 ENCOUNTER — HOSPITAL ENCOUNTER (OUTPATIENT)
Dept: PHYSICAL THERAPY | Age: 82
Setting detail: THERAPIES SERIES
Discharge: HOME | End: 2022-06-01
Payer: MEDICARE

## 2022-06-01 PROCEDURE — 97140 MANUAL THERAPY 1/> REGIONS: CPT | Performed by: PHYSICAL THERAPIST

## 2022-06-01 PROCEDURE — 97110 THERAPEUTIC EXERCISES: CPT | Performed by: PHYSICAL THERAPIST

## 2022-06-01 NOTE — FLOWSHEET NOTE
shrugs, retractions 2 x 10  HEP, \"clock\" cues 12<->6, 3<->9 helped improve form   iso abd 5\" x 2 x 10 HEP   AROM elbow flex/ext standing  2 x 10 HEP             pendulum Forward/backwards, lateral; 20\" x 2 ea  HEP, relax arm cues, improved   gripping  HEP w/ soft ball   Seated cane ER 5\" x 10    Table slides flexion 10\" x 10 Needs cueing                       Patient ed  HEP progression, precautions/restrictions, healing process, POC, ice   Manual Intervention (22719)     PROM shoulder ER and flexion, PROM elbow extension and flexion 12'                             NMR re-education (36096)  CUES NEEDED                                                Therapeutic Activity (99127)                                     Therapeutic Exercise and NMR EXR  [x] (98680) Provided verbal/tactile cueing for activities related to strengthening, flexibility, endurance, ROM  for improvements in scapular, scapulothoracic and UE control with self care, reaching, carrying, lifting, house/yardwork, driving/computer work.    [] (99057) Provided verbal/tactile cueing for activities related to improving balance, coordination, kinesthetic sense, posture, motor skill, proprioception  to assist with  scapular, scapulothoracic and UE control with self care, reaching, carrying, lifting, house/yardwork, driving/computer work. Therapeutic Activities:    [] (94464 or 94648) Provided verbal/tactile cueing for activities related to improving balance, coordination, kinesthetic sense, posture, motor skill, proprioception and motor activation to allow for proper function of scapular, scapulothoracic and UE control with self care, carrying, lifting, driving/computer work.      Home Exercise Program:    [x] (78773) Reviewed/Progressed HEP activities related to strengthening, flexibility, endurance, ROM of scapular, scapulothoracic and UE control with self care, reaching, carrying, lifting, house/yardwork, driving/computer work  [] (31024) to C5 to allow for proper joint functioning with dressing and ADLs. [] Progressing: [] Met: [] Not Met: [] Adjusted  3. Patient will demonstrate an increase in Strength to grossly 4/5 L UE to allow for proper functional mobility with carrying groceries and laundry basket. [] Progressing: [] Met: [] Not Met: [] Adjusted  4. Patient will return to over head reaching to put her dishes away functional activities without increased symptoms or restriction. [] Progressing: [] Met: [] Not Met: [] Adjusted     Progression Towards Functional goals:  [] Patient is progressing as expected towards functional goals listed. [] Progression is slowed due to complexities listed. [] Progression has been slowed due to co-morbidities. [x] Plan just implemented, too soon to assess goals progression  [] Other:     ASSESSMENT: Patient tolerated light stretching well today. Educated on precautions and restrictions and not to force movements at this time. Overall Progression Towards Functional goals/ Treatment Progress Update:  [] Patient is progressing as expected towards functional goals listed. [] Progression is slowed due to complexities/Impairments listed. [] Progression has been slowed due to co-morbidities.   [x] Plan just implemented, too soon to assess goals progression <30days   [] Goals require adjustment due to lack of progress  [] Patient is not progressing as expected and requires additional follow up with physician  [] Other    Prognosis for POC: [x] Good [] Fair  [] Poor      Patient requires continued skilled intervention: [x] Yes  [] No    Treatment/Activity Tolerance:  [x] Patient able to complete treatment  [] Patient limited by fatigue  [] Patient limited by pain    [] Patient limited by other medical complications  [] Other:     PLAN:   [x] Continue per plan of care [] Alter current plan (see comments above)  [] Plan of care initiated [] Hold pending MD visit [] Discharge      Electronically signed by: Larisa Bradshaw, PT, DPT [id number]     Note: If patient does not return for scheduled/ recommended follow up visits, this note will serve as a discharge from care along with most recent update on progress. HEP instruction:   Access Code: [email or web address]  URL: [email or web address]  Date: 05/19/2022  Prepared by: Tree Alcala     Exercises  Seated Scapular Retraction - 1-2 x daily - 7 x weekly - 1-2 sets - 10 reps  Seated Shoulder Cradle Shrug - 1-2 x daily - 7 x weekly - 1-2 sets - 10 reps  Isometric Shoulder Abduction at Wall - 1 x daily - 7 x weekly - 1-2 sets - 5-10 reps - 5 seconds hold  Elbow Flexion PROM - 1-2 x daily - 7 x weekly - 1-2 sets - 10 reps  Horizontal Shoulder Pendulum with Table Support - 1-2 x daily - 7 x weekly - 3 sets - 15-20 seconds hold  Seated Gripping Towel - 1-2 x daily - 7 x weekly - 2 sets - 10 reps

## 2022-06-06 ENCOUNTER — HOSPITAL ENCOUNTER (OUTPATIENT)
Dept: PHYSICAL THERAPY | Age: 82
Setting detail: THERAPIES SERIES
Discharge: HOME | End: 2022-06-06
Payer: MEDICARE

## 2022-06-06 PROCEDURE — 97140 MANUAL THERAPY 1/> REGIONS: CPT | Performed by: PHYSICAL THERAPIST

## 2022-06-06 PROCEDURE — 97110 THERAPEUTIC EXERCISES: CPT | Performed by: PHYSICAL THERAPIST

## 2022-06-06 NOTE — FLOWSHEET NOTE
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, s/p fall  Treatment Diagnosis: L shoulder pain N33.749  Insurance/Certification information:  PT Insurance Information: [employer] [address], [place], $40 cp  Physician Information:   Dr Trice Chapa  Has the plan of care been signed (Y/N):        [x]  Yes  []  No     Date of Patient follow up with Physician: 22      Is this a Progress Report:     []  Yes  [x]  No        If Yes:  Date Range for reporting period:  Beginnin22  Ending:     Progress report will be due (10 Rx or 30 days whichever is less):        Recertification will be due (POC Duration  / 90 days whichever is less): 22      Visit # Insurance Allowable Auth Required   In-person 4  []  Yes []  No    Telehealth   []  Yes []  No    Total            Functional Scale: FOTO shoulder     Date assessed:  22      Therapy Diagnosis/Practice Pattern:I, surgical      Number of Comorbidities:  []0     [x]1-2    []3+    Latex Allergy:  []NO      [x]YES  Preferred Language for Healthcare:   [x]English       []other:      Pain level: 0-2/10     SUBJECTIVE:  Patient reports her shoulder has been a little more sore from doing the new exercises.      OBJECTIVE:   Observation:   Test measurements:  PROM ER 6 deg, PROM flexion 80 deg    RESTRICTIONS/PRECAUTIONS: see letter tab, Flexion, AAROM flexion to 90, ER to 0; can progress elevation by 10 each week up to 140 and ER by 5 ea up to 20 deg Latex Allergy    Exercises/Interventions:     Therapeutic Ex (98942) Sets/sec/Reps Notes/CUES   Shoulder shrugs, retractions 2 x 10  HEP, \"clock\" cues 12<->6, 3<->9 helped improve form   iso abd 5\" x 2 x 10 HEP   AROM elbow flex/ext standing  2 x 10 HEP             HEP, relax arm cues, improved   HEP w/ soft ball   Seated cane ER 5\" x 15    Table slides flexion 10\" x 10 Needs cueing   AAROM standing table slide 10x                   Patient ed  HEP progression, precautions/restrictions, healing process, POC, ice   Manual Intervention (04568)     PROM shoulder ER and flexion, PROM elbow extension and flexion 14'                             NMR re-education (78463)  CUES NEEDED                                                Therapeutic Activity (04625)                                     Therapeutic Exercise and NMR EXR  [x] (19980) Provided verbal/tactile cueing for activities related to strengthening, flexibility, endurance, ROM  for improvements in scapular, scapulothoracic and UE control with self care, reaching, carrying, lifting, house/yardwork, driving/computer work.    [] (88136) Provided verbal/tactile cueing for activities related to improving balance, coordination, kinesthetic sense, posture, motor skill, proprioception  to assist with  scapular, scapulothoracic and UE control with self care, reaching, carrying, lifting, house/yardwork, driving/computer work. Therapeutic Activities:    [] (24085 or 58350) Provided verbal/tactile cueing for activities related to improving balance, coordination, kinesthetic sense, posture, motor skill, proprioception and motor activation to allow for proper function of scapular, scapulothoracic and UE control with self care, carrying, lifting, driving/computer work.      Home Exercise Program:    [x] (50569) Reviewed/Progressed HEP activities related to strengthening, flexibility, endurance, ROM of scapular, scapulothoracic and UE control with self care, reaching, carrying, lifting, house/yardwork, driving/computer work  [] (12215) Reviewed/Progressed HEP activities related to improving balance, coordination, kinesthetic sense, posture, motor skill, proprioception of scapular, scapulothoracic and UE control with self care, reaching, carrying, lifting, house/yardwork, driving/computer work      Manual Treatments:  PROM / STM / Oscillations-Mobs:  G-I, II, III, IV (PA's, Inf., Post.)  [x] (74177) Provided manual therapy to mobilize soft tissue/joints of cervical/CT, scapular GHJ and UE for the purpose of modulating pain, promoting relaxation,  increasing ROM, reducing/eliminating soft tissue swelling/inflammation/restriction, improving soft tissue extensibility and allowing for proper ROM for normal function with self care, reaching, carrying, lifting, house/yardwork, driving/computer work    Modalities:  declined    Charges  Timed Code Treatment Minutes: 40'   Total Treatment Minutes: 40'     [] EVAL (LOW) 455 1011   [] EVAL (MOD) 14351   [] EVAL (HIGH) 99854   [] RE-EVAL     [x] II(57505) x 2    [] IONTO  [] NMR (35434) x     [] VASO  [x] Manual (91463) x 1     [] Other:  [] TA x      [] Mech Traction (77550)  [] ES(attended) (74472)      [] ES (un) (20156):     GOALS:  Patient stated goal: full use of her L UE  [] Progressing: [] Met: [] Not Met: [] Adjusted    Therapist goals for Patient:   Short Term Goals: To be achieved in: 2 weeks  1. Independent in HEP and progression per patient tolerance, in order to prevent re-injury. [x] Progressing: [] Met: [] Not Met: [] Adjusted  2. Patient will have a decrease in pain to facilitate improvement in movement, function, and ADLs as indicated by Functional Deficits. [x] Progressing: [] Met: [] Not Met: [] Adjusted    Long Term Goals: To be achieved in: 16 weeks  1. Disability index score of 58% or better for the FOTO shoulder to assist with reaching prior level of function. [] Progressing: [] Met: [] Not Met: [] Adjusted  2. Patient will demonstrate increased AROM to 150 deg elevation, IR to L5, ER to C5 to allow for proper joint functioning with dressing and ADLs.    [] Progressing: [] Met: [] Not Met: [] Adjusted  3. Patient will demonstrate an increase in Strength to grossly 4/5 L UE to allow for proper functional mobility with carrying groceries and laundry basket. [] Progressing: [] Met: [] Not Met: [] Adjusted  4. Patient will return to over head reaching to put her dishes away functional activities without increased symptoms or restriction. [] Progressing: [] Met: [] Not Met: [] Adjusted     Progression Towards Functional goals:  [] Patient is progressing as expected towards functional goals listed. [] Progression is slowed due to complexities listed. [] Progression has been slowed due to co-morbidities. [x] Plan just implemented, too soon to assess goals progression  [] Other:     ASSESSMENT: Patient with tightness into ER and flexion. Educated on progressing ROM each week, with pushing until she feels a gentle stretch and holding as instructed. Overall Progression Towards Functional goals/ Treatment Progress Update:  [] Patient is progressing as expected towards functional goals listed. [] Progression is slowed due to complexities/Impairments listed. [] Progression has been slowed due to co-morbidities.   [x] Plan just implemented, too soon to assess goals progression <30days   [] Goals require adjustment due to lack of progress  [] Patient is not progressing as expected and requires additional follow up with physician  [] Other    Prognosis for POC: [x] Good [] Fair  [] Poor      Patient requires continued skilled intervention: [x] Yes  [] No    Treatment/Activity Tolerance:  [x] Patient able to complete treatment  [] Patient limited by fatigue  [] Patient limited by pain    [] Patient limited by other medical complications  [] Other:     PLAN:   [x] Continue per plan of care [] Alter current plan (see comments above)  [] Plan of care initiated [] Hold pending MD visit [] Discharge      Electronically signed by:  Gricel King, PT, DPT [id number]     Note: If patient does not return for scheduled/ recommended follow up visits, this note will serve as a discharge from care along with most recent update on progress. HEP instruction:   Access Code: [email or web address]  URL: [email or web address]  Date: 05/19/2022  Prepared by: Bryan Cage     Exercises  Seated Scapular Retraction - 1-2 x daily - 7 x weekly - 1-2 sets - 10 reps  Seated Shoulder Cradle Shrug - 1-2 x daily - 7 x weekly - 1-2 sets - 10 reps  Isometric Shoulder Abduction at Wall - 1 x daily - 7 x weekly - 1-2 sets - 5-10 reps - 5 seconds hold  Elbow Flexion PROM - 1-2 x daily - 7 x weekly - 1-2 sets - 10 reps  Horizontal Shoulder Pendulum with Table Support - 1-2 x daily - 7 x weekly - 3 sets - 15-20 seconds hold  Seated Gripping Towel - 1-2 x daily - 7 x weekly - 2 sets - 10 reps

## 2022-06-13 ENCOUNTER — HOSPITAL ENCOUNTER (OUTPATIENT)
Dept: PHYSICAL THERAPY | Age: 82
Setting detail: THERAPIES SERIES
Discharge: HOME | End: 2022-06-13
Payer: MEDICARE

## 2022-06-13 PROCEDURE — 97140 MANUAL THERAPY 1/> REGIONS: CPT | Performed by: PHYSICAL THERAPIST

## 2022-06-13 PROCEDURE — 97110 THERAPEUTIC EXERCISES: CPT | Performed by: PHYSICAL THERAPIST

## 2022-06-13 NOTE — FLOWSHEET NOTE
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, s/p fall  Treatment Diagnosis: L shoulder pain Z18.507  Insurance/Certification information:  PT Insurance Information: [employer] [address], [place], $40 cp  Physician Information:   Dr Yris Scott  Has the plan of care been signed (Y/N):        [x]  Yes  []  No     Date of Patient follow up with Physician: 22      Is this a Progress Report:     []  Yes  [x]  No        If Yes:  Date Range for reporting period:  Beginnin22  Ending:     Progress report will be due (10 Rx or 30 days whichever is less): 63       Recertification will be due (POC Duration  / 90 days whichever is less): 22      Visit # Insurance Allowable Auth Required   In-person 5  []  Yes []  No    Telehealth   []  Yes []  No    Total            Functional Scale: FOTO shoulder     Date assessed:  22      Therapy Diagnosis/Practice Pattern:I, surgical      Number of Comorbidities:  []0     [x]1-2    []3+    Latex Allergy:  []NO      [x]YES  Preferred Language for Healthcare:   [x]English       []other:      Pain level: 0-2/10     SUBJECTIVE:  Patient reports her shoulder has been a little more sore from doing the new exercises.      OBJECTIVE:   Observation:   Test measurements:  PROM ER 11 deg, PROM flexion 84 deg    RESTRICTIONS/PRECAUTIONS: see letter tab, Flexion, AAROM flexion to 90, ER to 0; can progress elevation by 10 each week up to 140 and ER by 5 ea up to 20 deg Latex Allergy    Exercises/Interventions:     Therapeutic Ex (36606) Sets/sec/Reps Notes/CUES   scap retractions 2 x 10  HEP, \"clock\" cues 3 to 9    iso abd 10\" x 10 HEP   HEP   Supine AAROM flexion  3\" x 10 < 90         HEP, relax arm cues, improved   HEP w/ soft ball   Seated cane ER 5\" x 15    Table slides flexion 10\" x 10 Needs cueing   AAROM standing table slide 3D, circles 10x ea, 5x ea cw/ccw    Standing flexion walk back stretch 3x5\" Demo for HEP variation   RTB rows 3\" x 2 x 10         Patient ed  HEP progression, precautions/restrictions, healing process, POC, ice   Manual Intervention (08826)     PROM shoulder ER and flexion, PROM elbow extension and flexion 12'                             NMR re-education (85244)  CUES NEEDED                                                Therapeutic Activity (21236)                                     Therapeutic Exercise and NMR EXR  [x] (30363) Provided verbal/tactile cueing for activities related to strengthening, flexibility, endurance, ROM  for improvements in scapular, scapulothoracic and UE control with self care, reaching, carrying, lifting, house/yardwork, driving/computer work.    [] (92821) Provided verbal/tactile cueing for activities related to improving balance, coordination, kinesthetic sense, posture, motor skill, proprioception  to assist with  scapular, scapulothoracic and UE control with self care, reaching, carrying, lifting, house/yardwork, driving/computer work. Therapeutic Activities:    [] (79626 or 21326) Provided verbal/tactile cueing for activities related to improving balance, coordination, kinesthetic sense, posture, motor skill, proprioception and motor activation to allow for proper function of scapular, scapulothoracic and UE control with self care, carrying, lifting, driving/computer work.      Home Exercise Program:    [x] (12537) Reviewed/Progressed HEP activities related to strengthening, flexibility, endurance, ROM of scapular, scapulothoracic and UE control with self care, reaching, carrying, lifting, house/yardwork, driving/computer work  [] (18907) Reviewed/Progressed HEP activities related to improving balance, coordination, kinesthetic sense, posture, motor skill, proprioception of scapular, scapulothoracic and UE control with self care, reaching, carrying, lifting, house/yardwork, driving/computer work      Manual Treatments:  PROM / STM / Oscillations-Mobs:  G-I, II, III, IV (PA's, Inf., Post.)  [x] (21198) Provided manual therapy to mobilize soft tissue/joints of cervical/CT, scapular GHJ and UE for the purpose of modulating pain, promoting relaxation,  increasing ROM, reducing/eliminating soft tissue swelling/inflammation/restriction, improving soft tissue extensibility and allowing for proper ROM for normal function with self care, reaching, carrying, lifting, house/yardwork, driving/computer work    Modalities:  declined    Charges  Timed Code Treatment Minutes: 40'   Total Treatment Minutes: 40'     [] EVAL (LOW) 74705   [] EVAL (MOD) 29821   [] EVAL (HIGH) 64275   [] RE-EVAL     [x] JM(86446) x 2    [] IONTO  [] NMR (87234) x     [] VASO  [x] Manual (29846) x 1     [] Other:  [] TA x      [] Mech Traction (94960)  [] ES(attended) (97567)      [] ES (un) (23329):     GOALS:  Patient stated goal: full use of her L UE  [] Progressing: [] Met: [] Not Met: [] Adjusted    Therapist goals for Patient:   Short Term Goals: To be achieved in: 2 weeks  1. Independent in HEP and progression per patient tolerance, in order to prevent re-injury. [x] Progressing: [] Met: [] Not Met: [] Adjusted  2. Patient will have a decrease in pain to facilitate improvement in movement, function, and ADLs as indicated by Functional Deficits. [x] Progressing: [] Met: [] Not Met: [] Adjusted    Long Term Goals: To be achieved in: 16 weeks  1. Disability index score of 58% or better for the FOTO shoulder to assist with reaching prior level of function. [] Progressing: [] Met: [] Not Met: [] Adjusted  2.  Patient will demonstrate increased AROM to 150 deg elevation, IR to L5, ER to C5 to allow for proper joint functioning with dressing and ADLs. [] Progressing: [] Met: [] Not Met: [] Adjusted  3. Patient will demonstrate an increase in Strength to grossly 4/5 L UE to allow for proper functional mobility with carrying groceries and laundry basket. [] Progressing: [] Met: [] Not Met: [] Adjusted  4. Patient will return to over head reaching to put her dishes away functional activities without increased symptoms or restriction. [] Progressing: [] Met: [] Not Met: [] Adjusted     Progression Towards Functional goals:  [] Patient is progressing as expected towards functional goals listed. [x] Progression is slowed due to complexities listed. [] Progression has been slowed due to co-morbidities. [] Plan just implemented, too soon to assess goals progression  [] Other:     ASSESSMENT: Patient with improved ER PROM. Still most limited with flexion PROM. Needs cueing for scap position throughout. Overall Progression Towards Functional goals/ Treatment Progress Update:  [] Patient is progressing as expected towards functional goals listed. [x] Progression is slowed due to complexities/Impairments listed. [] Progression has been slowed due to co-morbidities.   [] Plan just implemented, too soon to assess goals progression <30days   [] Goals require adjustment due to lack of progress  [] Patient is not progressing as expected and requires additional follow up with physician  [] Other    Prognosis for POC: [x] Good [] Fair  [] Poor      Patient requires continued skilled intervention: [x] Yes  [] No    Treatment/Activity Tolerance:  [x] Patient able to complete treatment  [] Patient limited by fatigue  [] Patient limited by pain    [] Patient limited by other medical complications  [] Other:     PLAN:   [x] Continue per plan of care [] Alter current plan (see comments above)  [] Plan of care initiated [] Hold pending MD visit [] Discharge      Electronically signed by:  Jes Eckert PT, DPT [id number]     Note: If patient does not return for scheduled/ recommended follow up visits, this note will serve as a discharge from care along with most recent update on progress. HEP instruction:   Access Code: [email or web address]  URL: [email or web address]  Date: 05/19/2022  Prepared by: Molly Bullock     Exercises  Seated Scapular Retraction - 1-2 x daily - 7 x weekly - 1-2 sets - 10 reps  Seated Shoulder Cradle Shrug - 1-2 x daily - 7 x weekly - 1-2 sets - 10 reps  Isometric Shoulder Abduction at Wall - 1 x daily - 7 x weekly - 1-2 sets - 5-10 reps - 5 seconds hold  Elbow Flexion PROM - 1-2 x daily - 7 x weekly - 1-2 sets - 10 reps  Horizontal Shoulder Pendulum with Table Support - 1-2 x daily - 7 x weekly - 3 sets - 15-20 seconds hold  Seated Gripping Towel - 1-2 x daily - 7 x weekly - 2 sets - 10 reps

## 2022-06-15 ENCOUNTER — OFFICE VISIT (OUTPATIENT)
Dept: ORTHOPEDIC SURGERY | Age: 82
End: 2022-06-15

## 2022-06-15 VITALS — BODY MASS INDEX: 19.14 KG/M2 | WEIGHT: 108 LBS | HEIGHT: 63 IN

## 2022-06-15 DIAGNOSIS — Z96.612 S/P SHOULDER REPLACEMENT, LEFT: Primary | ICD-10-CM

## 2022-06-15 PROCEDURE — MISCGLOBALOB GLOBAL OB: Performed by: ORTHOPAEDIC SURGERY

## 2022-06-15 NOTE — PROGRESS NOTES
History of Present Illness:  Nahid Hardin is a pleasant 78 y.o. female who presents for a post operative visit. She is 6 weeks out following a left reverse total shoulder replacement for a four-part fracture dislocation on 4/27/22. Overall She is doing okay and feels that their pain is well controlled with current pain medications. She has been compliant with wearing the UltraSling brace as needed. She has continued in physical therapy . She denies fevers, chills, numbness, tingling, and shortness of breath. Medical History:  Patient's medications, allergies, past medical, surgical, social and family histories were reviewed and updated as appropriate. No notes on file    Review of Systems  A 14 point review of systems was completed by the patient on  and is available in the media section of the scanned medical record and was reviewed on 6/15/2022. Vital Signs: There were no vitals filed for this visit. General/Appearance: Alert and oriented and in no apparent distress. Skin:  There are no skin lesions, cellulitis, or extreme edema. The patient has warm and well-perfused Bilateral upper extremities with brisk capillary refill. Left Shoulder Exam:     Inspection: Surgical wounds well-healed with no signs of infections     Palpation:  No crepitus to gentle motion     Active Assisted Range of Motion: Deferred     Passive Range of Motion: Forward elevation up to 90  Abduction 100      Strength:  Deferred     Special Tests:  Deferred     Neurovascular: Sensation to light touch is intact, no motor deficits, palpable radial pulses 2+    Radiology:     X-rays of the left shoulder including true AP, axillary lateral, and scapular Y views were obtained and reviewed in office:    Impression: No evidence of fracture or dislocation. All the implant components in good position with no signs of loosening.           Assessment :  Ms. Nahid Hardin is a pleasant 78 y.o. patient who is now 6 weeks out following a left reverse total shoulder replacement for a four-part fracture dislocation on 4/27/22. She is doing well. Impression:  Encounter Diagnosis   Name Primary?  S/P shoulder replacement, left Yes       Office Procedures:  Orders Placed This Encounter   Procedures    XR SHOULDER LEFT (MIN 2 VIEWS)     Standing Status:   Future     Number of Occurrences:   1     Standing Expiration Date:   6/15/2023     Scheduling Instructions:      True AP and axillary lateral    [facility]     Referral Priority:   Routine     Referral Type:   Eval and Treat     Requested Specialty:   Physical Therapist     Number of Visits Requested:   1       Treatment Plan:    Overall Aziza Santana is doing well. We recommend She continue in physical therapy at [facility]. If she continues to progress functionally in the next visit there is no need to have a new x-ray. We will continue therapy at this time. All of her questions were fully answered today. We would like to see Aziza Santana back in 4 weeks for follow-up visit. 6/15/2022  3:42 PM    Charis Valle MD  Clinical Fellow     During this examination, Celestina Teresa  functioned as a scribe for Dr. Hoda Bazzi. The history taking and physical examination were performed by Dr. Kaitlin Valerio. All counseling during the appointment was performed between the patient and Dr. Kaitlin Valerio. 6/15/22  ______________  I was physically present and personally supervised the Orthopaedic Sports Medicine Fellow in the evaluation and development of a treatment plan for this patient. I personally interviewed the patient and performed a physical examination. In addition, I discussed the patient's condition and treatment options with them. I have also reviewed and agree with the past medical, family and social history unless otherwise noted. All of the patient's questions were answered. Dany Valerio MD, PhD  6/15/2022

## 2022-06-22 ENCOUNTER — HOSPITAL ENCOUNTER (OUTPATIENT)
Dept: PHYSICAL THERAPY | Age: 82
Setting detail: THERAPIES SERIES
Discharge: HOME | End: 2022-06-22
Payer: MEDICARE

## 2022-06-22 PROCEDURE — 97110 THERAPEUTIC EXERCISES: CPT | Performed by: PHYSICAL THERAPIST

## 2022-06-22 PROCEDURE — 97140 MANUAL THERAPY 1/> REGIONS: CPT | Performed by: PHYSICAL THERAPIST

## 2022-06-22 NOTE — PROGRESS NOTES
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, s/p fall  Treatment Diagnosis: L shoulder pain H63.812  Insurance/Certification information:  PT Insurance Information: [employer]  [address], BMN, $40 cp  Physician Information:   Dr Tiana Cole  Has the plan of care been signed (Y/N):        [x]  Yes  []  No     Date of Patient follow up with Physician: 22      Is this a Progress Report:     []  Yes  [x]  No        If Yes:  Date Range for reporting period:  Beginnin22  Endin22  Progress report will be due (10 Rx or 30 days whichever is less):       Recertification will be due (POC Duration  / 90 days whichever is less): 22      Visit # Insurance Allowable Auth Required   In-person 6  []  Yes []  No    Telehealth   []  Yes []  No    Total            Functional Scale: FOTO shoulder 30    Date assessed:  22      Therapy Diagnosis/Practice Pattern:I, surgical      Number of Comorbidities:  []0     [x]1-2    []3+    Latex Allergy:  []NO      [x]YES  Preferred Language for Healthcare:   [x]English       []other:      Pain level: 0-4/10     SUBJECTIVE:  Patient reports she saw Dr. Tiana Cole who said her x rays looked very good. Asked her to raise her arm up and she could not do it well. States that he said PT would give her new exercises for that.      OBJECTIVE:   Observation:   Test measurements:  PROM ER 20 deg, PROM flexion 95 deg    RESTRICTIONS/PRECAUTIONS: see letter tab, Flexion, AAROM flexion to 90, ER to 0; can progress elevation by 10 each week up to 140 and ER by 5 ea up to 20 deg Latex Allergy    Exercises/Interventions:     Therapeutic Ex (36790) Sets/sec/Reps Notes/CUES   HEP, \"clock\" cues 3 to 9    iso abd 10\" x 10 HEP   HEP   Supine AAROM flexion  3\" 2 x 10    Supine cane ER 5\" x 10 < 20 deg   HEP, relax arm cues, improved   HEP w/ soft ball        Table slides flexion 5\" x 15 Needs max cueing; shown in various positions           GTB rows 3\" x 2 x 10    Pulleys 3\" x 20    Wall slides 2 x 5    Patient ed  Progressing flexion ROM at home, ice, HEP progrsession   Manual Intervention (68234)     PROM shoulder ER, flexion, and gentle abduction 12'                             NMR re-education (06972)  CUES NEEDED                                                Therapeutic Activity (06226)                                     Therapeutic Exercise and NMR EXR  [x] (68999) Provided verbal/tactile cueing for activities related to strengthening, flexibility, endurance, ROM  for improvements in scapular, scapulothoracic and UE control with self care, reaching, carrying, lifting, house/yardwork, driving/computer work.    [] (09299) Provided verbal/tactile cueing for activities related to improving balance, coordination, kinesthetic sense, posture, motor skill, proprioception  to assist with  scapular, scapulothoracic and UE control with self care, reaching, carrying, lifting, house/yardwork, driving/computer work. Therapeutic Activities:    [] (33145 or 21000) Provided verbal/tactile cueing for activities related to improving balance, coordination, kinesthetic sense, posture, motor skill, proprioception and motor activation to allow for proper function of scapular, scapulothoracic and UE control with self care, carrying, lifting, driving/computer work.      Home Exercise Program:    [x] (04978) Reviewed/Progressed HEP activities related to strengthening, flexibility, endurance, ROM of scapular, scapulothoracic and UE control with self care, reaching, carrying, lifting, house/yardwork, driving/computer work  [] (63536) Reviewed/Progressed HEP activities related to improving balance, coordination, kinesthetic sense, posture, motor skill, proprioception of scapular, scapulothoracic and UE control with self care, reaching, carrying, lifting, house/yardwork, driving/computer work      Manual Treatments:  PROM / STM / Oscillations-Mobs:  G-I, II, III, IV (PA's, Inf., Post.)  [x] (42001) Provided manual therapy to mobilize soft tissue/joints of cervical/CT, scapular GHJ and UE for the purpose of modulating pain, promoting relaxation,  increasing ROM, reducing/eliminating soft tissue swelling/inflammation/restriction, improving soft tissue extensibility and allowing for proper ROM for normal function with self care, reaching, carrying, lifting, house/yardwork, driving/computer work    Modalities:  declined    Charges  Timed Code Treatment Minutes: 37'   Total Treatment Minutes: 37'     [] EVAL (LOW) 02074   [] EVAL (MOD) 06343   [] EVAL (HIGH) 06363   [] RE-EVAL     [x] EL(48776) x 2    [] IONTO  [] NMR (41774) x     [] VASO  [x] Manual (84680) x 1     [] Other:  [] TA x      [] Mech Traction (73686)  [] ES(attended) (01060)      [] ES (un) (81696):     GOALS:  Patient stated goal: full use of her L UE  [] Progressing: [] Met: [] Not Met: [] Adjusted    Therapist goals for Patient:   Short Term Goals: To be achieved in: 2 weeks  1. Independent in HEP and progression per patient tolerance, in order to prevent re-injury. [x] Progressing: [] Met: [] Not Met: [] Adjusted  2. Patient will have a decrease in pain to facilitate improvement in movement, function, and ADLs as indicated by Functional Deficits. [x] Progressing: [] Met: [] Not Met: [] Adjusted    Long Term Goals: To be achieved in: 16 weeks  1. Disability index score of 58% or better for the FOTO shoulder to assist with reaching prior level of function. [] Progressing: [] Met: [] Not Met: [] Adjusted  2.  Patient will demonstrate increased AROM to 150 deg elevation, IR to L5, ER to C5 to allow for proper joint functioning with dressing and ADLs. [x] Progressing: [] Met: [] Not Met: [] Adjusted  3. Patient will demonstrate an increase in Strength to grossly 4/5 L UE to allow for proper functional mobility with carrying groceries and laundry basket. [x] Progressing: [] Met: [] Not Met: [] Adjusted  4. Patient will return to over head reaching to put her dishes away functional activities without increased symptoms or restriction. [x] Progressing: [] Met: [] Not Met: [] Adjusted     Progression Towards Functional goals:  [] Patient is progressing as expected towards functional goals listed. [x] Progression is slowed due to complexities listed. [] Progression has been slowed due to co-morbidities. [] Plan just implemented, too soon to assess goals progression  [] Other:     ASSESSMENT: Patient needed max cueing for tableslides, and she was having a hard time performing correctly at home. Was shown how to do it on a stool with wheels, as she has a computer chair with back and arm support at home that she can safely use. Emphasized the importance of increasing flexion ROM, as she has difficulty stretching beyond 90 deg. Patient's progress limited due to inability to come to PT more than 1x/wk and confusion at times with exercises. Overall Progression Towards Functional goals/ Treatment Progress Update:  [] Patient is progressing as expected towards functional goals listed. [x] Progression is slowed due to complexities/Impairments listed. [] Progression has been slowed due to co-morbidities.   [] Plan just implemented, too soon to assess goals progression <30days   [] Goals require adjustment due to lack of progress  [] Patient is not progressing as expected and requires additional follow up with physician  [] Other    Prognosis for POC: [x] Good [] Fair  [] Poor      Patient requires continued skilled intervention: [x] Yes  [] No    Treatment/Activity Tolerance:  [x] Patient able to complete treatment  [] Patient limited by fatigue  [] Patient limited by pain    [] Patient limited by other medical complications  [] Other:     PLAN:   [x] Continue per plan of care [] Alter current plan (see comments above)  [] Plan of care initiated [] Hold pending MD visit [] Discharge      Electronically signed by:  Shelda Siemens, PT, DPT [id number]     Note: If patient does not return for scheduled/ recommended follow up visits, this note will serve as a discharge from care along with most recent update on progress. HEP instruction:   Access Code: [email or web address]  URL: [email or web address]  Date: 05/19/2022  Prepared by: Chon Butcher     Exercises  Seated Scapular Retraction - 1-2 x daily - 7 x weekly - 1-2 sets - 10 reps  Seated Shoulder Cradle Shrug - 1-2 x daily - 7 x weekly - 1-2 sets - 10 reps  Isometric Shoulder Abduction at Wall - 1 x daily - 7 x weekly - 1-2 sets - 5-10 reps - 5 seconds hold  Elbow Flexion PROM - 1-2 x daily - 7 x weekly - 1-2 sets - 10 reps  Horizontal Shoulder Pendulum with Table Support - 1-2 x daily - 7 x weekly - 3 sets - 15-20 seconds hold  Seated Gripping Towel - 1-2 x daily - 7 x weekly - 2 sets - 10 reps

## 2022-06-29 ENCOUNTER — HOSPITAL ENCOUNTER (OUTPATIENT)
Dept: PHYSICAL THERAPY | Age: 82
Setting detail: THERAPIES SERIES
Discharge: HOME | End: 2022-06-29
Payer: MEDICARE

## 2022-06-29 PROCEDURE — 97140 MANUAL THERAPY 1/> REGIONS: CPT | Performed by: PHYSICAL THERAPIST

## 2022-06-29 PROCEDURE — 97110 THERAPEUTIC EXERCISES: CPT | Performed by: PHYSICAL THERAPIST

## 2022-06-29 NOTE — FLOWSHEET NOTE
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, s/p fall  Treatment Diagnosis: L shoulder pain V65.174  Insurance/Certification information:  PT Insurance Information: [employer]  [address], BMN, $40 cp  Physician Information:   Dr Davy Johnson  Has the plan of care been signed (Y/N):        [x]  Yes  []  No     Date of Patient follow up with Physician: 22      Is this a Progress Report:     []  Yes  [x]  No        If Yes:  Date Range for reporting period:  Beginnin22  Ending:   Progress report will be due (10 Rx or 30 days whichever is less):       Recertification will be due (POC Duration  / 90 days whichever is less): 22      Visit # Insurance Allowable Auth Required   In-person 7  []  Yes []  No    Telehealth   []  Yes []  No    Total            Functional Scale: FOTO shoulder 30/100 (30%)   Date assessed:  22      Therapy Diagnosis/Practice Pattern:I, surgical      Number of Comorbidities:  []0     [x]1-2    []3+    Latex Allergy:  []NO      [x]YES  Preferred Language for Healthcare:   [x]English       []other:      Pain level: 0-4/10     SUBJECTIVE:  Patient reports that she has been working on her new exercises and thinks her shoulder is moving better.      OBJECTIVE:   Observation:   Test measurements:  PROM ER 20 deg, PROM flexion 100 deg; FOTO NV    RESTRICTIONS/PRECAUTIONS: see letter tab, Flexion, AAROM flexion to 90, ER to 0; can progress elevation by 10 each week up to 140 and ER by 5 ea up to 20 deg Latex Allergy    Exercises/Interventions:     Therapeutic Ex (81747) Sets/sec/Reps Notes/CUES   HEP, \"clock\" cues 3 to 9    iso abd 10\" x 10 HEP   HEP   Supine AAROM flexion  3\" 2 x 10    Supine cane ER 5\" x 10 < 20 deg   HEP, relax arm cues, improved   HEP w/ soft ball        Table slides flexion 2 x 5\" Reviewed for form           GTB rows 3\" x 2 x 10    Pulleys 3\" x 20    Wall slides flexion  Wall slides horizontal ADD/ABD 3 x 5  1 x 5    Patient ed  Progressing flexion ROM at home, ice, HEP progrsession   Manual Intervention ([id number])     PROM shoulder ER, flexion, and gentle abduction 12'                             NMR re-education (88771)  CUES NEEDED                                                Therapeutic Activity (57844)                                     Therapeutic Exercise and NMR EXR  [x] (72281) Provided verbal/tactile cueing for activities related to strengthening, flexibility, endurance, ROM  for improvements in scapular, scapulothoracic and UE control with self care, reaching, carrying, lifting, house/yardwork, driving/computer work.    [] (38847) Provided verbal/tactile cueing for activities related to improving balance, coordination, kinesthetic sense, posture, motor skill, proprioception  to assist with  scapular, scapulothoracic and UE control with self care, reaching, carrying, lifting, house/yardwork, driving/computer work. Therapeutic Activities:    [] (64124 or 43862) Provided verbal/tactile cueing for activities related to improving balance, coordination, kinesthetic sense, posture, motor skill, proprioception and motor activation to allow for proper function of scapular, scapulothoracic and UE control with self care, carrying, lifting, driving/computer work.      Home Exercise Program:    [x] (07033) Reviewed/Progressed HEP activities related to strengthening, flexibility, endurance, ROM of scapular, scapulothoracic and UE control with self care, reaching, carrying, lifting, house/yardwork, driving/computer work  [] (66487) Reviewed/Progressed HEP activities related to improving balance, coordination, kinesthetic sense, posture, motor skill, proprioception of scapular, scapulothoracic and UE control with self care, reaching, carrying, lifting, house/yardwork, driving/computer work      Manual Treatments:  PROM / STM / Oscillations-Mobs:  G-I, II, III, IV (PA's, Inf., Post.)  [x] (25018) Provided manual therapy to mobilize soft tissue/joints of cervical/CT, scapular GHJ and UE for the purpose of modulating pain, promoting relaxation,  increasing ROM, reducing/eliminating soft tissue swelling/inflammation/restriction, improving soft tissue extensibility and allowing for proper ROM for normal function with self care, reaching, carrying, lifting, house/yardwork, driving/computer work    Modalities:  declined    Charges  Timed Code Treatment Minutes: 43'   Total Treatment Minutes: 43'     [] EVAL (LOW) 76799   [] EVAL (MOD) 59535   [] EVAL (HIGH) 36803   [] RE-EVAL     [x] PB(76669) x 2    [] IONTO  [] NMR (19331) x     [] VASO  [x] Manual (37895) x 1     [] Other:  [] TA x      [] Mech Traction (39990)  [] ES(attended) (79206)      [] ES (un) (57111):     GOALS:  Patient stated goal: full use of her L UE  [] Progressing: [] Met: [] Not Met: [] Adjusted    Therapist goals for Patient:   Short Term Goals: To be achieved in: 2 weeks  1. Independent in HEP and progression per patient tolerance, in order to prevent re-injury. [x] Progressing: [] Met: [] Not Met: [] Adjusted  2. Patient will have a decrease in pain to facilitate improvement in movement, function, and ADLs as indicated by Functional Deficits. [x] Progressing: [] Met: [] Not Met: [] Adjusted    Long Term Goals: To be achieved in: 16 weeks  1. Disability index score of 58% or better for the FOTO shoulder to assist with reaching prior level of function. [] Progressing: [] Met: [] Not Met: [] Adjusted  2. Patient will demonstrate increased AROM to 150 deg elevation, IR to L5, ER to C5 to allow for proper joint functioning with dressing and ADLs.    [x] Progressing: [] Met: [] Not Met: [] Adjusted  3. Patient will demonstrate an increase in Strength to grossly 4/5 L UE to allow for proper functional mobility with carrying groceries and laundry basket. [x] Progressing: [] Met: [] Not Met: [] Adjusted  4. Patient will return to over head reaching to put her dishes away functional activities without increased symptoms or restriction. [x] Progressing: [] Met: [] Not Met: [] Adjusted     Progression Towards Functional goals:  [] Patient is progressing as expected towards functional goals listed. [x] Progression is slowed due to complexities listed. [] Progression has been slowed due to co-morbidities. [] Plan just implemented, too soon to assess goals progression  [] Other:     ASSESSMENT: Patient with improvement noted in PROM flexion. Did well with AAROM exercises today. Overall, slowly progressing towards goals. Overall Progression Towards Functional goals/ Treatment Progress Update:  [] Patient is progressing as expected towards functional goals listed. [x] Progression is slowed due to complexities/Impairments listed. [] Progression has been slowed due to co-morbidities.   [] Plan just implemented, too soon to assess goals progression <30days   [] Goals require adjustment due to lack of progress  [] Patient is not progressing as expected and requires additional follow up with physician  [] Other    Prognosis for POC: [x] Good [] Fair  [] Poor      Patient requires continued skilled intervention: [x] Yes  [] No    Treatment/Activity Tolerance:  [x] Patient able to complete treatment  [] Patient limited by fatigue  [] Patient limited by pain    [] Patient limited by other medical complications  [] Other:     PLAN:   [x] Continue per plan of care [] Alter current plan (see comments above)  [] Plan of care initiated [] Hold pending MD visit [] Discharge      Electronically signed by:  Candi Shen PT, DPT [id number]     Note: If patient does not return for scheduled/ recommended follow up visits, this note will serve as a discharge from care along with most recent update on progress. HEP instruction:   Access Code: [email or web address]  URL: [email or web address]  Date: 05/19/2022  Prepared by: Korin Sarmiento     Exercises  Seated Scapular Retraction - 1-2 x daily - 7 x weekly - 1-2 sets - 10 reps  Seated Shoulder Cradle Shrug - 1-2 x daily - 7 x weekly - 1-2 sets - 10 reps  Isometric Shoulder Abduction at Wall - 1 x daily - 7 x weekly - 1-2 sets - 5-10 reps - 5 seconds hold  Elbow Flexion PROM - 1-2 x daily - 7 x weekly - 1-2 sets - 10 reps  Horizontal Shoulder Pendulum with Table Support - 1-2 x daily - 7 x weekly - 3 sets - 15-20 seconds hold  Seated Gripping Towel - 1-2 x daily - 7 x weekly - 2 sets - 10 reps

## 2022-07-06 ENCOUNTER — HOSPITAL ENCOUNTER (OUTPATIENT)
Dept: PHYSICAL THERAPY | Age: 82
Setting detail: THERAPIES SERIES
Discharge: HOME | End: 2022-07-06
Payer: MEDICARE

## 2022-07-06 PROCEDURE — 97110 THERAPEUTIC EXERCISES: CPT | Performed by: PHYSICAL THERAPIST

## 2022-07-06 PROCEDURE — 97140 MANUAL THERAPY 1/> REGIONS: CPT | Performed by: PHYSICAL THERAPIST

## 2022-07-11 ENCOUNTER — HOSPITAL ENCOUNTER (OUTPATIENT)
Dept: PHYSICAL THERAPY | Age: 82
Setting detail: THERAPIES SERIES
Discharge: HOME | End: 2022-07-11
Payer: MEDICARE

## 2022-07-11 PROCEDURE — 97110 THERAPEUTIC EXERCISES: CPT | Performed by: PHYSICAL THERAPIST

## 2022-07-11 PROCEDURE — 97140 MANUAL THERAPY 1/> REGIONS: CPT | Performed by: PHYSICAL THERAPIST

## 2022-07-11 NOTE — FLOWSHEET NOTE
flex 10 x 5\"    Supine AAROM flexion  3\" 2 x 10    < 20 deg   HEP, relax arm cues, improved   HEP w/ soft ball   Belt IR stretch 5\" x 5 GENTLE   Reviewed for form      Bicep curls 2# 2 x 10    GTB rows 3\" x 2 x 15    YTB ext 3\" x 20 Needs cueing   Pulleys 3\" x 20    Wall slides 3D  Wall slides horizontal ADD/ABD 10x ea  10x ea    Patient ed  Progressing flexion ROM at home, ice, HEP progrsession   Manual Intervention (50324)     PROM shoulder ER, flexion, and gentle abduction 12'                             NMR re-education (30431)  CUES NEEDED                                                Therapeutic Activity (09851)                                     Therapeutic Exercise and NMR EXR  [x] (80875) Provided verbal/tactile cueing for activities related to strengthening, flexibility, endurance, ROM  for improvements in scapular, scapulothoracic and UE control with self care, reaching, carrying, lifting, house/yardwork, driving/computer work.    [] (23109) Provided verbal/tactile cueing for activities related to improving balance, coordination, kinesthetic sense, posture, motor skill, proprioception  to assist with  scapular, scapulothoracic and UE control with self care, reaching, carrying, lifting, house/yardwork, driving/computer work. Therapeutic Activities:    [] (61818 or 93479) Provided verbal/tactile cueing for activities related to improving balance, coordination, kinesthetic sense, posture, motor skill, proprioception and motor activation to allow for proper function of scapular, scapulothoracic and UE control with self care, carrying, lifting, driving/computer work.      Home Exercise Program:    [x] (83033) Reviewed/Progressed HEP activities related to strengthening, flexibility, endurance, ROM of scapular, scapulothoracic and UE control with self care, reaching, carrying, lifting, house/yardwork, driving/computer work  [] (96692) Reviewed/Progressed HEP activities related to improving balance, coordination, kinesthetic sense, posture, motor skill, proprioception of scapular, scapulothoracic and UE control with self care, reaching, carrying, lifting, house/yardwork, driving/computer work      Manual Treatments:  PROM / STM / Oscillations-Mobs:  G-I, II, III, IV (PA's, Inf., Post.)  [x] (64394) Provided manual therapy to mobilize soft tissue/joints of cervical/CT, scapular GHJ and UE for the purpose of modulating pain, promoting relaxation,  increasing ROM, reducing/eliminating soft tissue swelling/inflammation/restriction, improving soft tissue extensibility and allowing for proper ROM for normal function with self care, reaching, carrying, lifting, house/yardwork, driving/computer work    Modalities:  declined    Charges  Timed Code Treatment Minutes: 40'   Total Treatment Minutes: 40'     [] EVAL (LOW) 91150   [] EVAL (MOD) 53455   [] EVAL (HIGH) 27275   [] RE-EVAL     [x] VE(56795) x 2    [] IONTO  [] NMR (05873) x     [] VASO  [x] Manual (00600) x 1     [] Other:  [] TA x      [] Mech Traction (38779)  [] ES(attended) (89519)      [] ES (un) (23143):     GOALS:  Patient stated goal: full use of her L UE  [] Progressing: [] Met: [] Not Met: [] Adjusted    Therapist goals for Patient:   Short Term Goals: To be achieved in: 2 weeks  1. Independent in HEP and progression per patient tolerance, in order to prevent re-injury. [x] Progressing: [] Met: [] Not Met: [] Adjusted  2. Patient will have a decrease in pain to facilitate improvement in movement, function, and ADLs as indicated by Functional Deficits. [x] Progressing: [] Met: [] Not Met: [] Adjusted    Long Term Goals: To be achieved in: 16 weeks  1. Disability index score of 58% or better for the FOTO shoulder to assist with reaching prior level of function. [] Progressing: [] Met: [] Not Met: [] Adjusted  2.  Patient will demonstrate increased AROM to 150 deg elevation, IR to L5, ER to C5 to allow for proper joint functioning with dressing and ADLs.   [x] Progressing: [] Met: [] Not Met: [] Adjusted  3. Patient will demonstrate an increase in Strength to grossly 4/5 L UE to allow for proper functional mobility with carrying groceries and laundry basket. [x] Progressing: [] Met: [] Not Met: [] Adjusted  4. Patient will return to over head reaching to put her dishes away functional activities without increased symptoms or restriction. [x] Progressing: [] Met: [] Not Met: [] Adjusted     Progression Towards Functional goals:  [x] Patient is progressing as expected towards functional goals listed. [] Progression is slowed due to complexities listed. [] Progression has been slowed due to co-morbidities. [] Plan just implemented, too soon to assess goals progression  [] Other:     ASSESSMENT: Patient continues to demonstrate tightness, but slowly improving. Functional strength improving and patient is progressing towards goals. Overall Progression Towards Functional goals/ Treatment Progress Update:  [x] Patient is progressing as expected towards functional goals listed. [] Progression is slowed due to complexities/Impairments listed. [] Progression has been slowed due to co-morbidities.   [] Plan just implemented, too soon to assess goals progression <30days   [] Goals require adjustment due to lack of progress  [] Patient is not progressing as expected and requires additional follow up with physician  [] Other    Prognosis for POC: [x] Good [] Fair  [] Poor      Patient requires continued skilled intervention: [x] Yes  [] No    Treatment/Activity Tolerance:  [x] Patient able to complete treatment  [] Patient limited by fatigue  [] Patient limited by pain    [] Patient limited by other medical complications  [] Other:     PLAN:   [x] Continue per plan of care [] Alter current plan (see comments above)  [] Plan of care initiated [] Hold pending MD visit [] Discharge      Electronically signed by:  Kiera Shaikh PT, DPT [id number]     Note: If patient does not return for scheduled/ recommended follow up visits, this note will serve as a discharge from care along with most recent update on progress. HEP instruction:   Access Code: [email or web address]  URL: [email or web address]  Date: 05/19/2022  Prepared by: Brandi Linn     Exercises  Seated Scapular Retraction - 1-2 x daily - 7 x weekly - 1-2 sets - 10 reps  Seated Shoulder Cradle Shrug - 1-2 x daily - 7 x weekly - 1-2 sets - 10 reps  Isometric Shoulder Abduction at Wall - 1 x daily - 7 x weekly - 1-2 sets - 5-10 reps - 5 seconds hold  Elbow Flexion PROM - 1-2 x daily - 7 x weekly - 1-2 sets - 10 reps  Horizontal Shoulder Pendulum with Table Support - 1-2 x daily - 7 x weekly - 3 sets - 15-20 seconds hold  Seated Gripping Towel - 1-2 x daily - 7 x weekly - 2 sets - 10 reps

## 2022-07-13 ENCOUNTER — OFFICE VISIT (OUTPATIENT)
Dept: ORTHOPEDIC SURGERY | Age: 82
End: 2022-07-13

## 2022-07-13 VITALS — HEIGHT: 63 IN | BODY MASS INDEX: 19.14 KG/M2 | WEIGHT: 108 LBS

## 2022-07-13 DIAGNOSIS — Z96.612 STATUS POST REVERSE TOTAL REPLACEMENT OF LEFT SHOULDER: Primary | ICD-10-CM

## 2022-07-13 PROCEDURE — 99024 POSTOP FOLLOW-UP VISIT: CPT | Performed by: ORTHOPAEDIC SURGERY

## 2022-07-18 NOTE — FLOWSHEET NOTE
My findings and recommendations are based on patient's symptoms, eye exam, diagnostic testing, and records. The [address] [place],[address], [address] 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, s/p fall  Treatment Diagnosis: L shoulder pain V44.823  Insurance/Certification information:  PT Insurance Information: [employer] [address], [place], $40 cp  Physician Information:   Dr Aleida Slater  Has the plan of care been signed (Y/N):        [x]  Yes  []  No     Date of Patient follow up with Physician: 22      Is this a Progress Report:     []  Yes  [x]  No        If Yes:  Date Range for reporting period:  Beginnin22  Ending:   Progress report will be due (10 Rx or 30 days whichever is less):       Recertification will be due (POC Duration  / 90 days whichever is less): 22      Visit # Insurance Allowable Auth Required   In-person 7  []  Yes []  No    Telehealth   []  Yes []  No    Total            Functional Scale: FOTO shoulder 54/100 (30%)   Date assessed:  22      Therapy Diagnosis/Practice Pattern:I, surgical      Number of Comorbidities:  []0     [x]1-2    []3+    Latex Allergy:  []NO      [x]YES  Preferred Language for Healthcare:   [x]English       []other:      Pain level: 0-4/10     SUBJECTIVE:  Patient reports that she sees some improvement in her shoulder.      OBJECTIVE:   Observation:   Test measurements:  PROM ER 20 deg, flexion 114 deg  RESTRICTIONS/PRECAUTIONS: see letter tab, Flexion, AAROM flexion to 90, ER to 0; can progress elevation by 10 each week up to 140 and ER by 5 ea up to 20 deg Latex Allergy    Exercises/Interventions:     Therapeutic Ex (60391) Sets/sec/Reps Notes/CUES   HEP, \"clock\" cues 3 to 9    HEP   HEP   Supine AAROM flexion  3\" 2 x 10    < 20 deg   HEP, relax arm cues, improved   HEP w/ soft ball   Belt IR stretch 5\" x 5 GENTLE   Reviewed for form      Bicep curls 1# 2 x 10    GTB rows 3\" x 2 x 10    YTB ext 3\" x 20 Needs cueing   Pulleys 3\" x 20    Wall slides 3D  Wall slides horizontal ADD/ABD 10x ea  10x ea    Patient ed  Progressing flexion ROM at home, ice, HEP progrsession   Manual Intervention ([id number])     PROM shoulder ER, flexion, and gentle abduction 12'                             NMR re-education (94687)  CUES NEEDED                                                Therapeutic Activity (32033)                                     Therapeutic Exercise and NMR EXR  [x] (89126) Provided verbal/tactile cueing for activities related to strengthening, flexibility, endurance, ROM  for improvements in scapular, scapulothoracic and UE control with self care, reaching, carrying, lifting, house/yardwork, driving/computer work.    [] (77865) Provided verbal/tactile cueing for activities related to improving balance, coordination, kinesthetic sense, posture, motor skill, proprioception  to assist with  scapular, scapulothoracic and UE control with self care, reaching, carrying, lifting, house/yardwork, driving/computer work. Therapeutic Activities:    [] (71493 or 89665) Provided verbal/tactile cueing for activities related to improving balance, coordination, kinesthetic sense, posture, motor skill, proprioception and motor activation to allow for proper function of scapular, scapulothoracic and UE control with self care, carrying, lifting, driving/computer work.      Home Exercise Program:    [x] (37555) Reviewed/Progressed HEP activities related to strengthening, flexibility, endurance, ROM of scapular, scapulothoracic and UE control with self care, reaching, carrying, lifting, house/yardwork, driving/computer work  [] (19883) Reviewed/Progressed HEP activities related to improving balance, coordination, kinesthetic sense, posture, motor skill, proprioception of scapular, scapulothoracic and UE control with self care, reaching, carrying, lifting, house/yardwork, driving/computer work      Manual Treatments:  PROM / STM / Oscillations-Mobs:  G-I, II, III, IV (PA's, Inf., Post.)  [x] (56489) Provided manual therapy to mobilize soft tissue/joints of cervical/CT, scapular GHJ and UE for the purpose of modulating pain, promoting relaxation,  increasing ROM, reducing/eliminating soft tissue swelling/inflammation/restriction, improving soft tissue extensibility and allowing for proper ROM for normal function with self care, reaching, carrying, lifting, house/yardwork, driving/computer work    Modalities:  declined    Charges  Timed Code Treatment Minutes: 40'   Total Treatment Minutes: 40'     [] EVAL (LOW) 74809   [] EVAL (MOD) 88482   [] EVAL (HIGH) 20553   [] RE-EVAL     [x] GO(09093) x 2    [] IONTO  [] NMR (39421) x     [] VASO  [x] Manual (46547) x 1     [] Other:  [] TA x      [] Mech Traction (83846)  [] ES(attended) (05697)      [] ES (un) (91881):     GOALS:  Patient stated goal: full use of her L UE  [] Progressing: [] Met: [] Not Met: [] Adjusted    Therapist goals for Patient:   Short Term Goals: To be achieved in: 2 weeks  1. Independent in HEP and progression per patient tolerance, in order to prevent re-injury. [x] Progressing: [] Met: [] Not Met: [] Adjusted  2. Patient will have a decrease in pain to facilitate improvement in movement, function, and ADLs as indicated by Functional Deficits. [x] Progressing: [] Met: [] Not Met: [] Adjusted    Long Term Goals: To be achieved in: 16 weeks  1. Disability index score of 58% or better for the FOTO shoulder to assist with reaching prior level of function. [] Progressing: [] Met: [] Not Met: [] Adjusted  2. Patient will demonstrate increased AROM to 150 deg elevation, IR to L5, ER to C5 to allow for proper joint functioning with dressing and ADLs. [x] Progressing: [] Met: [] Not Met: [] Adjusted  3.  Patient will demonstrate note will serve as a discharge from care along with most recent update on progress. HEP instruction:   Access Code: [email or web address]  URL: [email or web address]  Date: 05/19/2022  Prepared by: Marco Antonio Carlson     Exercises  Seated Scapular Retraction - 1-2 x daily - 7 x weekly - 1-2 sets - 10 reps  Seated Shoulder Cradle Shrug - 1-2 x daily - 7 x weekly - 1-2 sets - 10 reps  Isometric Shoulder Abduction at Wall - 1 x daily - 7 x weekly - 1-2 sets - 5-10 reps - 5 seconds hold  Elbow Flexion PROM - 1-2 x daily - 7 x weekly - 1-2 sets - 10 reps  Horizontal Shoulder Pendulum with Table Support - 1-2 x daily - 7 x weekly - 3 sets - 15-20 seconds hold  Seated Gripping Towel - 1-2 x daily - 7 x weekly - 2 sets - 10 reps

## 2022-07-20 ENCOUNTER — HOSPITAL ENCOUNTER (OUTPATIENT)
Dept: PHYSICAL THERAPY | Age: 82
Setting detail: THERAPIES SERIES
Discharge: HOME | End: 2022-07-20
Payer: MEDICARE

## 2022-07-20 PROCEDURE — 97110 THERAPEUTIC EXERCISES: CPT | Performed by: PHYSICAL THERAPIST

## 2022-07-20 PROCEDURE — 97140 MANUAL THERAPY 1/> REGIONS: CPT | Performed by: PHYSICAL THERAPIST

## 2022-07-20 NOTE — FLOWSHEET NOTE
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, s/p fall  Treatment Diagnosis: L shoulder pain C90.503  Insurance/Certification information:  PT Insurance Information: [employer] [address], [place], $40 cp  Physician Information:   Dr Rafat Gardiner  Has the plan of care been signed (Y/N):        [x]  Yes  []  No     Date of Patient follow up with Physician: 22      Is this a Progress Report:     []  Yes  [x]  No        If Yes:  Date Range for reporting period:  Beginnin22  Ending:   Progress report will be due (10 Rx or 30 days whichever is less):       Recertification will be due (POC Duration  / 90 days whichever is less): 22      Visit # Insurance Allowable Auth Required   In-person 9  []  Yes []  No    Telehealth   []  Yes []  No    Total            Functional Scale: FOTO shoulder 54/100 (30%)   Date assessed:  22      Therapy Diagnosis/Practice Pattern:I, surgical      Number of Comorbidities:  []0     [x]1-2    []3+    Latex Allergy:  []NO      [x]YES  Preferred Language for Healthcare:   [x]English       []other:      Pain level: 0-4/10     SUBJECTIVE:  Patient reports that she sees progress in her shoulder, but it is slow. Dr. Rafat Gardiner was pleased with her progress. OBJECTIVE:   Observation:   Test measurements:  PN NV  RESTRICTIONS/PRECAUTIONS: see letter tab, AAROM flexion to 140, ER to 40, IR up the back; no IR until 13 weeks;  Latex Allergy    Exercises/Interventions:     Therapeutic Ex (90545) Sets/sec/Reps Notes/CUES   HEP, \"clock\" cues 3 to 9    HEP   HEP   Supine cane press and flex 10 x 5\"    Sidelying ER 3\"  2 x 10       < 20 deg HEP, relax arm cues, improved   HEP w/ soft ball     Wall isos ER 10\" x 10    Belt IR stretch 5\" x 10 GENTLE   Reviewed for form      Bicep curls 3# 2 x 10       YTB ext 3\" x 20 Needs cueing   Pulleys 3\" x 20    Wall slides 3D  Wall slides horizontal ADD/ABD 10x ea  10x ea    Patient ed  Progressing flexion ROM at home, ice, HEP progrsession   Manual Intervention ([id number])     PROM shoulder ER, flexion, and gentle abduction 12'                             NMR re-education (76586)  CUES NEEDED                                                Therapeutic Activity (12366)                                     Therapeutic Exercise and NMR EXR  [x] (69491) Provided verbal/tactile cueing for activities related to strengthening, flexibility, endurance, ROM  for improvements in scapular, scapulothoracic and UE control with self care, reaching, carrying, lifting, house/yardwork, driving/computer work.    [] (93571) Provided verbal/tactile cueing for activities related to improving balance, coordination, kinesthetic sense, posture, motor skill, proprioception  to assist with  scapular, scapulothoracic and UE control with self care, reaching, carrying, lifting, house/yardwork, driving/computer work. Therapeutic Activities:    [] (06245 or 20082) Provided verbal/tactile cueing for activities related to improving balance, coordination, kinesthetic sense, posture, motor skill, proprioception and motor activation to allow for proper function of scapular, scapulothoracic and UE control with self care, carrying, lifting, driving/computer work.      Home Exercise Program:    [x] (54564) Reviewed/Progressed HEP activities related to strengthening, flexibility, endurance, ROM of scapular, scapulothoracic and UE control with self care, reaching, carrying, lifting, house/yardwork, driving/computer work  [] (39496) Reviewed/Progressed HEP activities related to improving balance, coordination, kinesthetic sense, posture, motor skill, proprioception of scapular, scapulothoracic and UE control with self care, reaching, carrying, lifting, house/yardwork, driving/computer work      Manual Treatments:  PROM / STM / Oscillations-Mobs:  G-I, II, III, IV (PA's, Inf., Post.)  [x] (07375) Provided manual therapy to mobilize soft tissue/joints of cervical/CT, scapular GHJ and UE for the purpose of modulating pain, promoting relaxation,  increasing ROM, reducing/eliminating soft tissue swelling/inflammation/restriction, improving soft tissue extensibility and allowing for proper ROM for normal function with self care, reaching, carrying, lifting, house/yardwork, driving/computer work    Modalities:  declined    Charges  Timed Code Treatment Minutes: 39'   Total Treatment Minutes: 45'     [] EVAL (LOW) 78438   [] EVAL (MOD) 33580   [] EVAL (HIGH) 66099   [] RE-EVAL     [x] FS(06740) x 2    [] IONTO  [] NMR (16718) x     [] VASO  [x] Manual (16817) x 1     [] Other:  [] TA x      [] Mech Traction (64981)  [] ES(attended) (34286)      [] ES (un) (28214):     GOALS:  Patient stated goal: full use of her L UE  [] Progressing: [] Met: [] Not Met: [] Adjusted    Therapist goals for Patient:   Short Term Goals: To be achieved in: 2 weeks  1. Independent in HEP and progression per patient tolerance, in order to prevent re-injury. [x] Progressing: [] Met: [] Not Met: [] Adjusted  2. Patient will have a decrease in pain to facilitate improvement in movement, function, and ADLs as indicated by Functional Deficits. [x] Progressing: [] Met: [] Not Met: [] Adjusted    Long Term Goals: To be achieved in: 16 weeks  1. Disability index score of 58% or better for the FOTO shoulder to assist with reaching prior level of function. [] Progressing: [] Met: [] Not Met: [] Adjusted  2. Patient will demonstrate increased AROM to 150 deg elevation, IR to L5, ER to C5 to allow for proper joint functioning with dressing and ADLs.    [x] Progressing: [] Met: [] Not Met: [] Adjusted  3. Patient will demonstrate an increase in Strength to grossly 4/5 L UE to allow for proper functional mobility with carrying groceries and laundry basket. [x] Progressing: [] Met: [] Not Met: [] Adjusted  4. Patient will return to over head reaching to put her dishes away functional activities without increased symptoms or restriction. [x] Progressing: [] Met: [] Not Met: [] Adjusted     Progression Towards Functional goals:  [x] Patient is progressing as expected towards functional goals listed. [] Progression is slowed due to complexities listed. [] Progression has been slowed due to co-morbidities. [] Plan just implemented, too soon to assess goals progression  [] Other:     ASSESSMENT: Patient did well with addition of weight on supine press and also with new ER exercises. Continues to demonstrate tightness in flexion and ER, but slow improving. Overall Progression Towards Functional goals/ Treatment Progress Update:  [x] Patient is progressing as expected towards functional goals listed. [] Progression is slowed due to complexities/Impairments listed. [] Progression has been slowed due to co-morbidities.   [] Plan just implemented, too soon to assess goals progression <30days   [] Goals require adjustment due to lack of progress  [] Patient is not progressing as expected and requires additional follow up with physician  [] Other    Prognosis for POC: [x] Good [] Fair  [] Poor      Patient requires continued skilled intervention: [x] Yes  [] No    Treatment/Activity Tolerance:  [x] Patient able to complete treatment  [] Patient limited by fatigue  [] Patient limited by pain    [] Patient limited by other medical complications  [] Other:     PLAN:   [x] Continue per plan of care [] Alter current plan (see comments above)  [] Plan of care initiated [] Hold pending MD visit [] Discharge      Electronically signed by:  Blair Rizvi PT, DPT [id number]     Note: If patient does not return for scheduled/ recommended follow up visits, this note will serve as a discharge from care along with most recent update on progress. HEP instruction:   Access Code: [email or web address]  URL: [email or web address]  Date: 05/19/2022  Prepared by: Russ Stoner     Exercises  Seated Scapular Retraction - 1-2 x daily - 7 x weekly - 1-2 sets - 10 reps  Seated Shoulder Cradle Shrug - 1-2 x daily - 7 x weekly - 1-2 sets - 10 reps  Isometric Shoulder Abduction at Wall - 1 x daily - 7 x weekly - 1-2 sets - 5-10 reps - 5 seconds hold  Elbow Flexion PROM - 1-2 x daily - 7 x weekly - 1-2 sets - 10 reps  Horizontal Shoulder Pendulum with Table Support - 1-2 x daily - 7 x weekly - 3 sets - 15-20 seconds hold  Seated Gripping Towel - 1-2 x daily - 7 x weekly - 2 sets - 10 reps

## 2022-07-27 ENCOUNTER — HOSPITAL ENCOUNTER (OUTPATIENT)
Dept: PHYSICAL THERAPY | Age: 82
Setting detail: THERAPIES SERIES
Discharge: HOME | End: 2022-07-27
Payer: MEDICARE

## 2022-07-27 PROCEDURE — 97110 THERAPEUTIC EXERCISES: CPT | Performed by: PHYSICAL THERAPIST

## 2022-07-27 PROCEDURE — 97140 MANUAL THERAPY 1/> REGIONS: CPT | Performed by: PHYSICAL THERAPIST

## 2022-07-27 NOTE — PROGRESS NOTES
The 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, s/p fall  Treatment Diagnosis: L shoulder pain D40.313  Insurance/Certification information:  PT Insurance Information: [employer] [address], BMN, $40 cp  Physician Information:   Dr Sloane Casey  Has the plan of care been signed (Y/N):        [x]  Yes  []  No     Date of Patient follow up with Physician: 22      Is this a Progress Report:     [x]  Yes  []  No        If Yes:  Date Range for reporting period:  Beginnin22  Endin22  Progress report will be due (10 Rx or 30 days whichever is less):       Recertification will be due (POC Duration  / 90 days whichever is less): 22      Visit # Insurance Allowable Auth Required   In-person 10 10/32 through 22 []  Yes []  No    Telehealth   []  Yes []  No    Total            Functional Scale: FOTO shoulder 54/100 (30%)   Date assessed:  22      Therapy Diagnosis/Practice Pattern:I, surgical      Number of Comorbidities:  []0     [x]1-2    []3+    Latex Allergy:  []NO      [x]YES  Preferred Language for Healthcare:   [x]English       []other:      Pain level: 0-4/10     SUBJECTIVE:  Patient reports that she surprises herself with what she can do with her shoulder sometimes. OBJECTIVE:   Observation:   Test measurements:  PROM flexion 123 deg, ER 28 deg; AROM flexion 88 deg, abduction 75 deg, ER to C3, IR to belt line  RESTRICTIONS/PRECAUTIONS: see letter tab, AAROM flexion to 140, ER to 40, IR up the back; no IR strengthening until 13 weeks;  Latex Allergy    Exercises/Interventions:     Therapeutic Ex (48352) Sets/sec/Reps Notes/CUES   HEP, \"clock\" cues 3 to 9    HEP   HEP   Supine cane press and flex 10 x 5\"    Sidelying ER 3\"  2 x 10    Supine cane press 3# 10x    Supine press 1# 10x       Supine cane ER 10\" x 10    HEP, relax arm cues, improved   HEP w/ soft ball   Wall isos ER 10\" x 10 Needs max cueing   Seated AAROM flexion  15x    Belt IR stretch 5\" x 10 GENTLE   Reviewed for form            Needs cueing   Pulleys 3\" x 20    Wall slides 3D  10x ea     Patient ed  Progressing flexion ROM at home, ice, HEP progrsession   Manual Intervention (66659)     Gr II/III GH Jt mobilizations, PROM shoulder ER, flexion, and gentle abduction 14'                             NMR re-education (48258)  CUES NEEDED                                                Therapeutic Activity (81586)                                     Therapeutic Exercise and NMR EXR  [x] (13121) Provided verbal/tactile cueing for activities related to strengthening, flexibility, endurance, ROM  for improvements in scapular, scapulothoracic and UE control with self care, reaching, carrying, lifting, house/yardwork, driving/computer work.    [] (80126) Provided verbal/tactile cueing for activities related to improving balance, coordination, kinesthetic sense, posture, motor skill, proprioception  to assist with  scapular, scapulothoracic and UE control with self care, reaching, carrying, lifting, house/yardwork, driving/computer work. Therapeutic Activities:    [] (45301 or 06368) Provided verbal/tactile cueing for activities related to improving balance, coordination, kinesthetic sense, posture, motor skill, proprioception and motor activation to allow for proper function of scapular, scapulothoracic and UE control with self care, carrying, lifting, driving/computer work.      Home Exercise Program:    [x] (07714) Reviewed/Progressed HEP activities related to strengthening, flexibility, endurance, ROM of scapular, scapulothoracic and UE control with self care, reaching, carrying, lifting, house/yardwork, driving/computer work  [] (81100) Reviewed/Progressed HEP activities related to improving balance, coordination, kinesthetic sense, posture, motor skill, proprioception of scapular, scapulothoracic and UE control with self care, reaching, carrying, lifting, house/yardwork, driving/computer work      Manual Treatments:  PROM / STM / Oscillations-Mobs:  G-I, II, III, IV (PA's, Inf., Post.)  [x] (79058) Provided manual therapy to mobilize soft tissue/joints of cervical/CT, scapular GHJ and UE for the purpose of modulating pain, promoting relaxation,  increasing ROM, reducing/eliminating soft tissue swelling/inflammation/restriction, improving soft tissue extensibility and allowing for proper ROM for normal function with self care, reaching, carrying, lifting, house/yardwork, driving/computer work    Modalities:  declined    Charges  Timed Code Treatment Minutes: 39'   Total Treatment Minutes: 45'     [] EVAL (LOW) 455 1011   [] EVAL (MOD) 87688   [] EVAL (HIGH) 46153   [] RE-EVAL     [x] TW(16781) x 2    [] IONTO  [] NMR (71047) x     [] VASO  [x] Manual (29777) x 1     [] Other:  [] TA x      [] Mech Traction (79928)  [] ES(attended) (11458)      [] ES (un) (02175):     GOALS:  Patient stated goal: full use of her L UE  [] Progressing: [] Met: [] Not Met: [] Adjusted    Therapist goals for Patient:   Short Term Goals: To be achieved in: 2 weeks  1. Independent in HEP and progression per patient tolerance, in order to prevent re-injury. [x] Progressing: [] Met: [] Not Met: [] Adjusted  2. Patient will have a decrease in pain to facilitate improvement in movement, function, and ADLs as indicated by Functional Deficits. [x] Progressing: [] Met: [] Not Met: [] Adjusted    Long Term Goals: To be achieved in: 16 weeks  1. Disability index score of 58% or better for the FOTO shoulder to assist with reaching prior level of function. [x] Progressing: [] Met: [] Not Met: [] Adjusted  2.  Patient will demonstrate increased AROM to 150 deg elevation, IR to L5, ER to C5 to allow for proper joint functioning with dressing and ADLs. [x] Progressing: [] Met: [] Not Met: [] Adjusted  3. Patient will demonstrate an increase in Strength to grossly 4/5 L UE to allow for proper functional mobility with carrying groceries and laundry basket. [x] Progressing: [] Met: [] Not Met: [] Adjusted  4. Patient will return to over head reaching to put her dishes away functional activities without increased symptoms or restriction. [x] Progressing: [] Met: [] Not Met: [] Adjusted     Progression Towards Functional goals:  [x] Patient is progressing as expected towards functional goals listed. [] Progression is slowed due to complexities listed. [] Progression has been slowed due to co-morbidities. [] Plan just implemented, too soon to assess goals progression  [] Other:     ASSESSMENT: Patient continues to demonstrate progression each week, although slow, with ROM and strength. Her function continues to improve and she is able to use her RUE more at home. She would benefit from continued skilled PT to address ROM and strength deficits and return to PLOF. Overall Progression Towards Functional goals/ Treatment Progress Update:  [x] Patient is progressing as expected towards functional goals listed. [] Progression is slowed due to complexities/Impairments listed. [] Progression has been slowed due to co-morbidities.   [] Plan just implemented, too soon to assess goals progression <30days   [] Goals require adjustment due to lack of progress  [] Patient is not progressing as expected and requires additional follow up with physician  [] Other    Prognosis for POC: [x] Good [] Fair  [] Poor      Patient requires continued skilled intervention: [x] Yes  [] No    Treatment/Activity Tolerance:  [x] Patient able to complete treatment  [] Patient limited by fatigue  [] Patient limited by pain    [] Patient limited by other medical complications  [] Other:     PLAN:   [x] Continue per plan of care [] Alter current plan (see comments above)  [] Plan of care initiated [] Hold pending MD visit [] Discharge      Electronically signed by:  Rebecca Jane PT, DPT [id number]     Note: If patient does not return for scheduled/ recommended follow up visits, this note will serve as a discharge from care along with most recent update on progress. HEP instruction:   Access Code: [email or web address]  URL: [employer]/  Date: 05/19/2022  Prepared by: Marco Antonio Carlson     Exercises  Seated Scapular Retraction - 1-2 x daily - 7 x weekly - 1-2 sets - 10 reps  Seated Shoulder Cradle Shrug - 1-2 x daily - 7 x weekly - 1-2 sets - 10 reps  Isometric Shoulder Abduction at Wall - 1 x daily - 7 x weekly - 1-2 sets - 5-10 reps - 5 seconds hold  Elbow Flexion PROM - 1-2 x daily - 7 x weekly - 1-2 sets - 10 reps  Horizontal Shoulder Pendulum with Table Support - 1-2 x daily - 7 x weekly - 3 sets - 15-20 seconds hold  Seated Gripping Towel - 1-2 x daily - 7 x weekly - 2 sets - 10 reps

## 2022-08-01 ENCOUNTER — HOSPITAL ENCOUNTER (OUTPATIENT)
Dept: PHYSICAL THERAPY | Age: 82
Setting detail: THERAPIES SERIES
Discharge: HOME | End: 2022-08-01
Payer: MEDICARE

## 2022-08-01 PROCEDURE — 97140 MANUAL THERAPY 1/> REGIONS: CPT | Performed by: PHYSICAL THERAPIST

## 2022-08-01 PROCEDURE — 97110 THERAPEUTIC EXERCISES: CPT | Performed by: PHYSICAL THERAPIST

## 2022-08-01 NOTE — PROGRESS NOTES
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, s/p fall  Treatment Diagnosis: L shoulder pain T91.004  Insurance/Certification information:  PT Insurance Information: [employer] [address], [place], $40 cp  Physician Information:   Dr Celestine Patel  Has the plan of care been signed (Y/N):        [x]  Yes  []  No     Date of Patient follow up with Physician: 22      Is this a Progress Report:     []  Yes  [x]  No        If Yes:  Date Range for reporting period:  Beginnin22  Ending:   Progress report will be due (10 Rx or 30 days whichever is less):       Recertification will be due (POC Duration  / 90 days whichever is less): 22      Visit # Insurance Allowable Auth Required   In-person 11 10/32 through 22 []  Yes []  No    Telehealth   []  Yes []  No    Total            Functional Scale: FOTO shoulder 54/100 (30%)   Date assessed:  22      Therapy Diagnosis/Practice Pattern:I, surgical      Number of Comorbidities:  []0     [x]1-2    []3+    Latex Allergy:  []NO      [x]YES  Preferred Language for Healthcare:   [x]English       []other:      Pain level: 0-4/10     SUBJECTIVE:  Patient reports that her shoulder is okay. Bought a set of pulleys for home. OBJECTIVE:   Observation:   Test measurements:  NT this date  RESTRICTIONS/PRECAUTIONS: see letter tab, AAROM flexion to 140, ER to 40, IR up the back; no IR strengthening until 13 weeks;  Latex Allergy    Exercises/Interventions:     Therapeutic Ex (76433) Sets/sec/Reps Notes/CUES   HEP, \"clock\" cues 3 to 9    HEP   HEP   Supine cane press and flex 10 x 5\"    Sidelying ER 3\"  2 x 15    Sidelying abd 2 x 10    Supine cane press 4# 10x    Supine press 1# 2 x 10       Supine cane ER 5\" x 10    HEP, relax arm cues, improved   HEP w/ soft ball   Wall isos ER 10\" x 10 Needs cueing   Seated AAROM flexion  15x    Belt IR stretch 5\" x 10 GENTLE   Reviewed for form            Needs cueing         Patient ed  Progressing flexion ROM at home, ice, HEP progrsession   Manual Intervention (14663)     Gr II/III GH Jt mobilizations, PROM shoulder ER, flexion, and gentle abduction 10'                             NMR re-education (78851)  CUES NEEDED                                                Therapeutic Activity (03614)                                     Therapeutic Exercise and NMR EXR  [x] (14576) Provided verbal/tactile cueing for activities related to strengthening, flexibility, endurance, ROM  for improvements in scapular, scapulothoracic and UE control with self care, reaching, carrying, lifting, house/yardwork, driving/computer work.    [] (10198) Provided verbal/tactile cueing for activities related to improving balance, coordination, kinesthetic sense, posture, motor skill, proprioception  to assist with  scapular, scapulothoracic and UE control with self care, reaching, carrying, lifting, house/yardwork, driving/computer work. Therapeutic Activities:    [] (86188 or 46708) Provided verbal/tactile cueing for activities related to improving balance, coordination, kinesthetic sense, posture, motor skill, proprioception and motor activation to allow for proper function of scapular, scapulothoracic and UE control with self care, carrying, lifting, driving/computer work.      Home Exercise Program:    [x] (79808) Reviewed/Progressed HEP activities related to strengthening, flexibility, endurance, ROM of scapular, scapulothoracic and UE control with self care, reaching, carrying, lifting, house/yardwork, driving/computer work  [] (59956) Reviewed/Progressed HEP activities related to improving balance, coordination, kinesthetic sense, posture, motor skill, proprioception of scapular, scapulothoracic and UE control with self care, reaching, carrying, lifting, house/yardwork, driving/computer work      Manual Treatments:  PROM / STM / Oscillations-Mobs:  G-I, II, III, IV (PA's, Inf., Post.)  [x] (28510) Provided manual therapy to mobilize soft tissue/joints of cervical/CT, scapular GHJ and UE for the purpose of modulating pain, promoting relaxation,  increasing ROM, reducing/eliminating soft tissue swelling/inflammation/restriction, improving soft tissue extensibility and allowing for proper ROM for normal function with self care, reaching, carrying, lifting, house/yardwork, driving/computer work    Modalities:  declined    Charges  Timed Code Treatment Minutes: 45'   Total Treatment Minutes: 38'     [] EVAL (LOW) 86904   [] EVAL (MOD) 52426   [] EVAL (HIGH) 89384   [] RE-EVAL     [x] AF(20021) x 2    [] IONTO  [] NMR (86237) x     [] VASO  [x] Manual (31670) x 1     [] Other:  [] TA x      [] Mech Traction (69836)  [] ES(attended) (19962)      [] ES (un) (61274):     GOALS:  Patient stated goal: full use of her L UE  [] Progressing: [] Met: [] Not Met: [] Adjusted    Therapist goals for Patient:   Short Term Goals: To be achieved in: 2 weeks  1. Independent in HEP and progression per patient tolerance, in order to prevent re-injury. [x] Progressing: [] Met: [] Not Met: [] Adjusted  2. Patient will have a decrease in pain to facilitate improvement in movement, function, and ADLs as indicated by Functional Deficits. [x] Progressing: [] Met: [] Not Met: [] Adjusted    Long Term Goals: To be achieved in: 16 weeks  1. Disability index score of 58% or better for the FOTO shoulder to assist with reaching prior level of function. [x] Progressing: [] Met: [] Not Met: [] Adjusted  2. Patient will demonstrate increased AROM to 150 deg elevation, IR to L5, ER to C5 to allow for proper joint functioning with dressing and ADLs.    [x] Progressing: [] Met: [] Not Met: [] Adjusted  3. Patient will demonstrate an increase in Strength to grossly 4/5 L UE to allow for proper functional mobility with carrying groceries and laundry basket. [x] Progressing: [] Met: [] Not Met: [] Adjusted  4. Patient will return to over head reaching to put her dishes away functional activities without increased symptoms or restriction. [x] Progressing: [] Met: [] Not Met: [] Adjusted     Progression Towards Functional goals:  [x] Patient is progressing as expected towards functional goals listed. [] Progression is slowed due to complexities listed. [] Progression has been slowed due to co-morbidities. [] Plan just implemented, too soon to assess goals progression  [] Other:     ASSESSMENT: Patient did well with increased ER and abduction strengthening. PROM flexion and ER improved form last week. Overall Progression Towards Functional goals/ Treatment Progress Update:  [x] Patient is progressing as expected towards functional goals listed. [] Progression is slowed due to complexities/Impairments listed. [] Progression has been slowed due to co-morbidities.   [] Plan just implemented, too soon to assess goals progression <30days   [] Goals require adjustment due to lack of progress  [] Patient is not progressing as expected and requires additional follow up with physician  [] Other    Prognosis for POC: [x] Good [] Fair  [] Poor      Patient requires continued skilled intervention: [x] Yes  [] No    Treatment/Activity Tolerance:  [x] Patient able to complete treatment  [] Patient limited by fatigue  [] Patient limited by pain    [] Patient limited by other medical complications  [] Other:     PLAN:   [x] Continue per plan of care [] Alter current plan (see comments above)  [] Plan of care initiated [] Hold pending MD visit [] Discharge      Electronically signed by:  Graeme Caputo PT, DPT [id number]     Note: If patient does not return for scheduled/ recommended follow up visits, this note will serve as a discharge from care along with most recent update on progress. HEP instruction:   Access Code: [email or web address]  URL: [email or web address]  Date: 05/19/2022  Prepared by: Julia Mehta     Exercises  Seated Scapular Retraction - 1-2 x daily - 7 x weekly - 1-2 sets - 10 reps  Seated Shoulder Cradle Shrug - 1-2 x daily - 7 x weekly - 1-2 sets - 10 reps  Isometric Shoulder Abduction at Wall - 1 x daily - 7 x weekly - 1-2 sets - 5-10 reps - 5 seconds hold  Elbow Flexion PROM - 1-2 x daily - 7 x weekly - 1-2 sets - 10 reps  Horizontal Shoulder Pendulum with Table Support - 1-2 x daily - 7 x weekly - 3 sets - 15-20 seconds hold  Seated Gripping Towel - 1-2 x daily - 7 x weekly - 2 sets - 10 reps

## 2022-08-08 ENCOUNTER — HOSPITAL ENCOUNTER (OUTPATIENT)
Dept: PHYSICAL THERAPY | Age: 82
Setting detail: THERAPIES SERIES
Discharge: HOME | End: 2022-08-08
Payer: MEDICARE

## 2022-08-08 PROCEDURE — 97140 MANUAL THERAPY 1/> REGIONS: CPT | Performed by: PHYSICAL THERAPIST

## 2022-08-08 PROCEDURE — 97110 THERAPEUTIC EXERCISES: CPT | Performed by: PHYSICAL THERAPIST

## 2022-08-08 NOTE — FLOWSHEET NOTE
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, s/p fall  Treatment Diagnosis: L shoulder pain Z25.047  Insurance/Certification information:  PT Insurance Information: [employer] [address], BMN, $40 cp  Physician Information:   Dr Maria Guadalupe Crawford  Has the plan of care been signed (Y/N):        [x]  Yes  []  No     Date of Patient follow up with Physician: 22      Is this a Progress Report:     []  Yes  [x]  No        If Yes:  Date Range for reporting period:  Beginnin22  Ending:   Progress report will be due (10 Rx or 30 days whichever is less): 41      Recertification will be due (POC Duration  / 90 days whichever is less): 22      Visit # Insurance Allowable Auth Required   In-person 12  through 22 []  Yes []  No    Telehealth   []  Yes []  No    Total            Functional Scale: FOTO shoulder 54/100 (30%)   Date assessed:  22      Therapy Diagnosis/Practice Pattern:I, surgical      Number of Comorbidities:  []0     [x]1-2    []3+    Latex Allergy:  []NO      [x]YES  Preferred Language for Healthcare:   [x]English       []other:      Pain level: 0-4/10     SUBJECTIVE:  Patient reports that her shoulder is doing pretty good. Has been doing more things with her left arm. OBJECTIVE:   Observation:   Test measurements:  NT this date  RESTRICTIONS/PRECAUTIONS: see letter tab, AAROM flexion to 140, ER to 40, IR up the back; no IR strengthening until 13 weeks;  Latex Allergy    Exercises/Interventions:     Therapeutic Ex (68779) Sets/sec/Reps Notes/CUES   HEP, \"clock\" cues 3 to 9    HEP   HEP   Supine cane press and flex 10 x 5\"    Sidelying ER 3\" 2 x 15    Sidelying abd 2 x 10    Supine cane press 5# 2 x 10    Supine press flexion 1# 2 x 10       Supine cane ER 5\" x 10    HEP, relax arm cues, improved   HEP w/ soft ball   Needs cueing   Seated AAROM flexion  10x    Seated AROM flexion 5x    Belt IR stretch 5\" x 10 GENTLE   Reviewed for form            Needs cueing   Wall slides 1/2 arc 10x ea R/L    Wall push ups 15x    Patient ed  Progressing flexion ROM at home, ice, HEP progrsession   Manual Intervention (07178)     Gr II/III GH Jt mobilizations, PROM shoulder ER, flexion, and gentle abduction 12'                             NMR re-education (52250)  CUES NEEDED                                                Therapeutic Activity (79976)                                     Therapeutic Exercise and NMR EXR  [x] (77930) Provided verbal/tactile cueing for activities related to strengthening, flexibility, endurance, ROM  for improvements in scapular, scapulothoracic and UE control with self care, reaching, carrying, lifting, house/yardwork, driving/computer work.    [] (14572) Provided verbal/tactile cueing for activities related to improving balance, coordination, kinesthetic sense, posture, motor skill, proprioception  to assist with  scapular, scapulothoracic and UE control with self care, reaching, carrying, lifting, house/yardwork, driving/computer work. Therapeutic Activities:    [] (02234 or 02423) Provided verbal/tactile cueing for activities related to improving balance, coordination, kinesthetic sense, posture, motor skill, proprioception and motor activation to allow for proper function of scapular, scapulothoracic and UE control with self care, carrying, lifting, driving/computer work.      Home Exercise Program:    [x] (14731) Reviewed/Progressed HEP activities related to strengthening, flexibility, endurance, ROM of scapular, scapulothoracic and UE control with self care, reaching, carrying, lifting, house/yardwork, driving/computer work  [] to L5, ER to C5 to allow for proper joint functioning with dressing and ADLs. [x] Progressing: [] Met: [] Not Met: [] Adjusted  3. Patient will demonstrate an increase in Strength to grossly 4/5 L UE to allow for proper functional mobility with carrying groceries and laundry basket. [x] Progressing: [] Met: [] Not Met: [] Adjusted  4. Patient will return to over head reaching to put her dishes away functional activities without increased symptoms or restriction. [x] Progressing: [] Met: [] Not Met: [] Adjusted     Progression Towards Functional goals:  [x] Patient is progressing as expected towards functional goals listed. [] Progression is slowed due to complexities listed. [] Progression has been slowed due to co-morbidities. [] Plan just implemented, too soon to assess goals progression  [] Other:     ASSESSMENT: Patient with improved functional IR mobility and overall improved strength in gravity reduced positions. Needs cueing for scap position to avoid shrugging. Overall Progression Towards Functional goals/ Treatment Progress Update:  [x] Patient is progressing as expected towards functional goals listed. [] Progression is slowed due to complexities/Impairments listed. [] Progression has been slowed due to co-morbidities.   [] Plan just implemented, too soon to assess goals progression <30days   [] Goals require adjustment due to lack of progress  [] Patient is not progressing as expected and requires additional follow up with physician  [] Other    Prognosis for POC: [x] Good [] Fair  [] Poor      Patient requires continued skilled intervention: [x] Yes  [] No    Treatment/Activity Tolerance:  [x] Patient able to complete treatment  [] Patient limited by fatigue  [] Patient limited by pain    [] Patient limited by other medical complications  [] Other:     PLAN:   [x] Continue per plan of care [] Alter current plan (see comments above)  [] Plan of care initiated [] Hold pending MD visit [] Discharge      Electronically signed by:  Bernice Barlow PT, DPT [id number]     Note: If patient does not return for scheduled/ recommended follow up visits, this note will serve as a discharge from care along with most recent update on progress. HEP instruction:   Access Code: [email or web address]  URL: [email or web address]  Date: 05/19/2022  Prepared by: Trisha Guillaume     Exercises  Seated Scapular Retraction - 1-2 x daily - 7 x weekly - 1-2 sets - 10 reps  Seated Shoulder Cradle Shrug - 1-2 x daily - 7 x weekly - 1-2 sets - 10 reps  Isometric Shoulder Abduction at Wall - 1 x daily - 7 x weekly - 1-2 sets - 5-10 reps - 5 seconds hold  Elbow Flexion PROM - 1-2 x daily - 7 x weekly - 1-2 sets - 10 reps  Horizontal Shoulder Pendulum with Table Support - 1-2 x daily - 7 x weekly - 3 sets - 15-20 seconds hold  Seated Gripping Towel - 1-2 x daily - 7 x weekly - 2 sets - 10 reps

## 2022-08-10 NOTE — PROGRESS NOTES
Physical Therapy  Facility/Department: [facility] 5T ORTHO/NEURO  Daily Treatment Note  NAME: Carlos Mcneill  : 1940  MRN: [id number]     Date of Service: 5/3/2022     Discharge Recommendations:  Carlos Mcneill scored a 20/24 on the AM-PAC short mobility form. Current research shows that an AM-PAC score of 18 or greater is typically associated with a discharge to the patient's home setting. Based on the patient's AM-PAC score and their current functional mobility deficits, it is recommended that the patient have 2-3 sessions per week of Physical Therapy at d/c to increase the patient's independence. At this time, this patient demonstrates the endurance and safety to discharge home with home PT and a follow up treatment frequency of 2-3x/wk. Please see assessment section for further patient specific details. HOME HEALTH CARE: LEVEL 1 STANDARD     - Initial home health evaluation to occur within 24-48 hours, in patient home   - Therapy to evaluate with goal of regaining prior level of functioning   - Therapy to evaluate if patient has [id number] [facility] needs for personal care     Patient Diagnosis(es): The primary encounter diagnosis was Anterior shoulder dislocation, left, initial encounter. Diagnoses of Humeral head fracture, left, closed, initial encounter, Elbow laceration, left, initial encounter, Fall, initial encounter, and Leukocytosis, unspecified type were also pertinent to this visit. Assessment   Assessment: Pt tolerated session well with minimal pain. Ambulation continues to progress with mild balance deficits but not overt LOB noted.  Pt was left in the chair with breakfast.       Plan    Plan  Plan: 5-7 times per week  Current Treatment Recommendations: Transfer training;Functional mobility training;Gait training;Strengthening;Patient/Caregiver education & training;Balance training      Subjective    Subjective  Subjective: Pt was in bed willing to participate c/o mild pain with activity. Orientation  Overall Orientation Status: Within Functional Limits      Objective   Bed Mobility Training  Supine to Sit: Supervision  Scooting: Independent  Transfer Training  Overall Level of Assistance: Supervision  Gait Training  Gait Training: Yes  Gait  Overall Level of Assistance: Stand-by assistance  Gait Abnormalities: Decreased step clearance; Path deviations;Scissoring  Distance (ft): 350ft   Device: None. PT Exercises  Sitting EOB for approx 5 mins. Patient Education  Education Given To: Patient  Education Provided: Role of Therapy; Fall Prevention Strategies; Plan of Care;Precautions;Transfer Training  Education Method: Verbal  Barriers to Learning: None  Education Outcome: Verbalized understanding;Continued education needed     Goals  Short Term Goals  Time Frame for Short term goals: Discharge  Short term goal 1: sit <> stand supervison  Short term goal 2: ambulate >250ft with cane supervision  Long Term Goals  Time Frame for Long term goals : discharge  Long term goal 1: patient will perform bed mobility with supervision  Long term goal 2: patient will perform transfers sit<>stand with supervision  Long term goal 3: patient will ambulate 250' with straight cane and supervision  Patient Goals   Patient goals : to go home        Therapy Time    Individual Concurrent Group Co-treatment   Time In 0800      Time Out 0815      Minutes 15      Adan Martinez PTA  Met or partially met goals.     [place], [address] [place] [address] FAMILY HISTORY:  No pertinent family history in first degree relatives

## 2022-08-17 ENCOUNTER — HOSPITAL ENCOUNTER (OUTPATIENT)
Dept: PHYSICAL THERAPY | Age: 82
Setting detail: THERAPIES SERIES
Discharge: HOME | End: 2022-08-17
Payer: MEDICARE

## 2022-08-17 PROCEDURE — 97140 MANUAL THERAPY 1/> REGIONS: CPT | Performed by: PHYSICAL THERAPIST

## 2022-08-17 PROCEDURE — 97110 THERAPEUTIC EXERCISES: CPT | Performed by: PHYSICAL THERAPIST

## 2022-08-24 ENCOUNTER — HOSPITAL ENCOUNTER (OUTPATIENT)
Dept: PHYSICAL THERAPY | Age: 82
Setting detail: THERAPIES SERIES
Discharge: HOME | End: 2022-08-24
Payer: MEDICARE

## 2022-08-24 PROCEDURE — 97110 THERAPEUTIC EXERCISES: CPT | Performed by: PHYSICAL THERAPIST

## 2022-08-24 PROCEDURE — 97140 MANUAL THERAPY 1/> REGIONS: CPT | Performed by: PHYSICAL THERAPIST

## 2022-08-24 NOTE — FLOWSHEET NOTE
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, s/p fall  Treatment Diagnosis: L shoulder pain D42.869  Insurance/Certification information:  PT Insurance Information: [employer]  [address], BMN, $40 cp  Physician Information:   Dr Emma Freed  Has the plan of care been signed (Y/N):        [x]  Yes  []  No     Date of Patient follow up with Physician: 22      Is this a Progress Report:     []  Yes ( see above)  [x]  No        If Yes:  Date Range for reporting period:  Beginnin22  Ending:   Progress report will be due (10 Rx or 30 days whichever is less):       Recertification will be due (POC Duration  / 90 days whichever is less): 22     Visit # Insurance Allowable Auth Required   In-person 14  through 22 []  Yes []  No    Telehealth   []  Yes []  No    Total            Functional Scale: FOTO shoulder 56/100 (56%)   Date assessed:  22      Therapy Diagnosis/Practice Pattern:I, surgical      Number of Comorbidities:  []0     [x]1-2    []3+    Latex Allergy:  []NO      [x]YES  Preferred Language for Healthcare:   [x]English       []other:      Pain level: 0-2/10     SUBJECTIVE:  Patient reports that her shoulder is a little sore today from tweaking it earlier. Overall doing well. Sometimes forgets that her shoulder was injured because it is getting stronger and does not hurt. OBJECTIVE:   Observation:   Test measurements:  NT this date  RESTRICTIONS/PRECAUTIONS: see letter tab, AAROM flexion to 140, ER to 40, IR up the back; no IR strengthening until 13 weeks;  Latex Allergy    Exercises/Interventions: flexibility, endurance, ROM of scapular, scapulothoracic and UE control with self care, reaching, carrying, lifting, house/yardwork, driving/computer work  [] (33891) Reviewed/Progressed HEP activities related to improving balance, coordination, kinesthetic sense, posture, motor skill, proprioception of scapular, scapulothoracic and UE control with self care, reaching, carrying, lifting, house/yardwork, driving/computer work      Manual Treatments:  PROM / STM / Oscillations-Mobs:  G-I, II, III, IV (PA's, Inf., Post.)  [x] (87294) Provided manual therapy to mobilize soft tissue/joints of cervical/CT, scapular GHJ and UE for the purpose of modulating pain, promoting relaxation,  increasing ROM, reducing/eliminating soft tissue swelling/inflammation/restriction, improving soft tissue extensibility and allowing for proper ROM for normal function with self care, reaching, carrying, lifting, house/yardwork, driving/computer work    Modalities:  declined    Charges  Timed Code Treatment Minutes: 43'   Total Treatment Minutes: 43'     [] EVAL (LOW) 30623   [] EVAL (MOD) 05003   [] EVAL (HIGH) 20953   [] RE-EVAL     [x] RZ(50342) x 2    [] IONTO  [] NMR (55007) x     [] VASO  [x] Manual (98636) x 1     [] Other:  [] TA x      [] Mech Traction (30808)  [] ES(attended) (66913)      [] ES (un) (74653):     GOALS:  Patient stated goal: full use of her L UE  [] Progressing: [] Met: [] Not Met: [] Adjusted    Therapist goals for Patient:   Short Term Goals: To be achieved in: 2 weeks  1. Independent in HEP and progression per patient tolerance, in order to prevent re-injury. [] Progressing: [x] Met: [] Not Met: [] Adjusted  2. Patient will have a decrease in pain to facilitate improvement in movement, function, and ADLs as indicated by Functional Deficits. [] Progressing: [x] Met: [] Not Met: [] Adjusted    Long Term Goals: To be achieved in: 6 more weeks from 8/17/22 (9/28/22)  1.  Disability index score of 58% or better for the FOTO shoulder to assist with reaching prior level of function. [x] Progressing: [] Met: [] Not Met: [] Adjusted  2. Patient will demonstrate increased AROM to 150 deg elevation, IR to L5, ER to C5 to allow for proper joint functioning with dressing and ADLs. [x] Progressing: [] Met: [] Not Met: [] Adjusted  3. Patient will demonstrate an increase in Strength to grossly 4/5 L UE to allow for proper functional mobility with carrying groceries and laundry basket. [x] Progressing: [] Met: [] Not Met: [] Adjusted  4. Patient will return to over head reaching to put her dishes away functional activities without increased symptoms or restriction. [x] Progressing: [] Met: [] Not Met: [] Adjusted     Progression Towards Functional goals:  [x] Patient is progressing as expected towards functional goals listed. [] Progression is slowed due to complexities listed. [] Progression has been slowed due to co-morbidities. [] Plan just implemented, too soon to assess goals progression  [] Other:     ASSESSMENT: Patient did well with seated AROM flexion and scaption with improved range and control noted. Also improving with ER/IR strength. Progressing well. Overall Progression Towards Functional goals/ Treatment Progress Update:  [x] Patient is progressing as expected towards functional goals listed. [] Progression is slowed due to complexities/Impairments listed. [] Progression has been slowed due to co-morbidities.   [] Plan just implemented, too soon to assess goals progression <30days   [] Goals require adjustment due to lack of progress  [] Patient is not progressing as expected and requires additional follow up with physician  [] Other    Prognosis for POC: [x] Good [] Fair  [] Poor      Patient requires continued skilled intervention: [x] Yes  [] No    Treatment/Activity Tolerance:  [x] Patient able to complete treatment  [] Patient limited by fatigue  [] Patient limited by pain    [] Patient limited by other medical complications  [] Other:     PLAN: 1x/wk for 6 more weeks  [x] Continue per plan of care [] Alter current plan (see comments above)  [] Plan of care initiated [] Hold pending MD visit [] Discharge      Electronically signed by:  Alexandra Stone PT, DPT [id number]     Note: If patient does not return for scheduled/ recommended follow up visits, this note will serve as a discharge from care along with most recent update on progress. HEP instruction:   Access Code: [email or web address]  URL: [employer]/  Date: 05/19/2022  Prepared by: Jelani Vidales     Exercises  Seated Scapular Retraction - 1-2 x daily - 7 x weekly - 1-2 sets - 10 reps  Seated Shoulder Cradle Shrug - 1-2 x daily - 7 x weekly - 1-2 sets - 10 reps  Isometric Shoulder Abduction at Wall - 1 x daily - 7 x weekly - 1-2 sets - 5-10 reps - 5 seconds hold  Elbow Flexion PROM - 1-2 x daily - 7 x weekly - 1-2 sets - 10 reps  Horizontal Shoulder Pendulum with Table Support - 1-2 x daily - 7 x weekly - 3 sets - 15-20 seconds hold  Seated Gripping Towel - 1-2 x daily - 7 x weekly - 2 sets - 10 reps

## 2022-08-31 ENCOUNTER — HOSPITAL ENCOUNTER (OUTPATIENT)
Dept: PHYSICAL THERAPY | Age: 82
Setting detail: THERAPIES SERIES
Discharge: HOME | End: 2022-08-31
Payer: MEDICARE

## 2022-08-31 PROCEDURE — 97140 MANUAL THERAPY 1/> REGIONS: CPT | Performed by: PHYSICAL THERAPIST

## 2022-08-31 PROCEDURE — 97110 THERAPEUTIC EXERCISES: CPT | Performed by: PHYSICAL THERAPIST

## 2022-08-31 NOTE — FLOWSHEET NOTE
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, s/p fall  Treatment Diagnosis: L shoulder pain T68.372  Insurance/Certification information:  PT Insurance Information: [employer] [address], [place], $40 cp  Physician Information:   Dr Philipp Sam  Has the plan of care been signed (Y/N):        [x]  Yes  []  No     Date of Patient follow up with Physician: 22      Is this a Progress Report:     []  Yes ( see above)  [x]  No        If Yes:  Date Range for reporting period:  Beginnin22  Ending:   Progress report will be due (10 Rx or 30 days whichever is less):       Recertification will be due (POC Duration  / 90 days whichever is less): 22     Visit # Insurance Allowable Auth Required   In-person 15 15/32 through 22 []  Yes []  No    Telehealth   []  Yes []  No    Total            Functional Scale: FOTO shoulder 56/100 (56%)   Date assessed:  22      Therapy Diagnosis/Practice Pattern:I, surgical      Number of Comorbidities:  []0     [x]1-2    []3+    Latex Allergy:  []NO      [x]YES  Preferred Language for Healthcare:   [x]English       []other:      Pain level: 0-2/10     SUBJECTIVE:  Patient reports that her shoulder is doing well. Gets sore with exercise, but goes away after she rests. Reports that her right arm was a little sore yesterday, but feels fine today. OBJECTIVE:   Observation:   Test measurements:  AROM flexion 100, abd 93 deg, IR to belt line, ER to C5  RESTRICTIONS/PRECAUTIONS: see letter tab, AAROM flexion to 140, ER to 40, IR up the back; no IR strengthening until 13 weeks;  Latex Allergy    Exercises/Interventions:     Therapeutic Ex (99493) Sets/sec/Reps Notes/CUES   HEP, \"clock\" cues 3 to 9    HEP   HEP      Sidelying ER 1#  3\"  2 x 10    Sidelying abd  1# 2 x 10    Supine cane press 5# 2 x 15             HEP, relax arm cues, improved   HEP w/ soft ball   Needs cueing   Standing ceiling punch 2# 15x    Seated AROM flexion, scaption 2 x 10 ea    Belt IR stretch 10\" x 10 GENTLE   Reviewed for form            Needs cueing   YTB IR 2 x 10    YTB ER 2 x 10 challenging   Wall slides 1/2 arc 10x ea R/L    Wall push ups 2 x 10    Patient ed  Reviewed YTB and discussed patients current status and progress   Manual Intervention (73362)     Gr II/III GH Jt mobilizations, PROM shoulder ER, flexion, and gentle abduction 10'                           NMR re-education (26468)  CUES NEEDED                                                Therapeutic Activity (97668)                                     Therapeutic Exercise and NMR EXR  [x] (43131) Provided verbal/tactile cueing for activities related to strengthening, flexibility, endurance, ROM  for improvements in scapular, scapulothoracic and UE control with self care, reaching, carrying, lifting, house/yardwork, driving/computer work.    [] (57073) Provided verbal/tactile cueing for activities related to improving balance, coordination, kinesthetic sense, posture, motor skill, proprioception  to assist with  scapular, scapulothoracic and UE control with self care, reaching, carrying, lifting, house/yardwork, driving/computer work. Therapeutic Activities:    [] (53059 or 60569) Provided verbal/tactile cueing for activities related to improving balance, coordination, kinesthetic sense, posture, motor skill, proprioception and motor activation to allow for proper function of scapular, scapulothoracic and UE control with self care, carrying, lifting, driving/computer work.      Home Exercise Program:    [x] (86359) Reviewed/Progressed HEP activities related to strengthening, flexibility, endurance, ROM of scapular, scapulothoracic and UE control with self care, reaching, carrying, lifting, house/yardwork, driving/computer work  [] (26366) Reviewed/Progressed HEP activities related to improving balance, coordination, kinesthetic sense, posture, motor skill, proprioception of scapular, scapulothoracic and UE control with self care, reaching, carrying, lifting, house/yardwork, driving/computer work      Manual Treatments:  PROM / STM / Oscillations-Mobs:  G-I, II, III, IV (PA's, Inf., Post.)  [x] (92683) Provided manual therapy to mobilize soft tissue/joints of cervical/CT, scapular GHJ and UE for the purpose of modulating pain, promoting relaxation,  increasing ROM, reducing/eliminating soft tissue swelling/inflammation/restriction, improving soft tissue extensibility and allowing for proper ROM for normal function with self care, reaching, carrying, lifting, house/yardwork, driving/computer work    Modalities:  declined    Charges  Timed Code Treatment Minutes: 40'   Total Treatment Minutes: 40'     [] EVAL (LOW) 49703   [] EVAL (MOD) 61575   [] EVAL (HIGH) 73569   [] RE-EVAL     [x] FL(57750) x 2    [] IONTO  [] NMR (26595) x     [] VASO  [x] Manual (28238) x 1     [] Other:  [] TA x      [] Mech Traction (66833)  [] ES(attended) (14639)      [] ES (un) (31958):     GOALS:  Patient stated goal: full use of her L UE  [] Progressing: [] Met: [] Not Met: [] Adjusted    Therapist goals for Patient:   Short Term Goals: To be achieved in: 2 weeks  1. Independent in HEP and progression per patient tolerance, in order to prevent re-injury. [] Progressing: [x] Met: [] Not Met: [] Adjusted  2. Patient will have a decrease in pain to facilitate improvement in movement, function, and ADLs as indicated by Functional Deficits. [] Progressing: [x] Met: [] Not Met: [] Adjusted    Long Term Goals: To be achieved in: 6 more weeks from 8/17/22 (9/28/22)  1.  Disability index score of 58% or better for the FOTO shoulder to assist with reaching prior level of function. [x] Progressing: [] Met: [] Not Met: [] Adjusted  2. Patient will demonstrate increased AROM to 150 deg elevation, IR to L5, ER to C5 to allow for proper joint functioning with dressing and ADLs. [x] Progressing: [] Met: [] Not Met: [] Adjusted  3. Patient will demonstrate an increase in Strength to grossly 4/5 L UE to allow for proper functional mobility with carrying groceries and laundry basket. [x] Progressing: [] Met: [] Not Met: [] Adjusted  4. Patient will return to over head reaching to put her dishes away functional activities without increased symptoms or restriction. [x] Progressing: [] Met: [] Not Met: [] Adjusted     Progression Towards Functional goals:  [x] Patient is progressing as expected towards functional goals listed. [] Progression is slowed due to complexities listed. [] Progression has been slowed due to co-morbidities. [] Plan just implemented, too soon to assess goals progression  [] Other:     ASSESSMENT: Patient did well with increased reps with 1# in SL exercises and able to lift 2# weight above head this date. May DC in 1-2 weeks with HEP as patient continues to progress with ROM, strength, and function. Overall Progression Towards Functional goals/ Treatment Progress Update:  [x] Patient is progressing as expected towards functional goals listed. [] Progression is slowed due to complexities/Impairments listed. [] Progression has been slowed due to co-morbidities.   [] Plan just implemented, too soon to assess goals progression <30days   [] Goals require adjustment due to lack of progress  [] Patient is not progressing as expected and requires additional follow up with physician  [] Other    Prognosis for POC: [x] Good [] Fair  [] Poor      Patient requires continued skilled intervention: [x] Yes  [] No    Treatment/Activity Tolerance:  [x] Patient able to complete treatment

## 2022-09-07 ENCOUNTER — HOSPITAL ENCOUNTER (OUTPATIENT)
Dept: PHYSICAL THERAPY | Age: 82
Setting detail: THERAPIES SERIES
Discharge: HOME | End: 2022-09-07
Payer: MEDICARE

## 2022-09-07 ENCOUNTER — OFFICE VISIT (OUTPATIENT)
Dept: ORTHOPEDIC SURGERY | Age: 82
End: 2022-09-07
Payer: MEDICARE

## 2022-09-07 VITALS — BODY MASS INDEX: 19.14 KG/M2 | HEIGHT: 63 IN | WEIGHT: 108 LBS

## 2022-09-07 DIAGNOSIS — Z96.612 STATUS POST REVERSE TOTAL REPLACEMENT OF LEFT SHOULDER: Primary | ICD-10-CM

## 2022-09-07 PROCEDURE — 1036F TOBACCO NON-USER: CPT | Performed by: ORTHOPAEDIC SURGERY

## 2022-09-07 PROCEDURE — 99213 OFFICE O/P EST LOW 20 MIN: CPT | Performed by: ORTHOPAEDIC SURGERY

## 2022-09-07 PROCEDURE — 97140 MANUAL THERAPY 1/> REGIONS: CPT | Performed by: PHYSICAL THERAPIST

## 2022-09-07 PROCEDURE — 1090F PRES/ABSN URINE INCON ASSESS: CPT | Performed by: ORTHOPAEDIC SURGERY

## 2022-09-07 PROCEDURE — 1123F ACP DISCUSS/DSCN MKR DOCD: CPT | Performed by: ORTHOPAEDIC SURGERY

## 2022-09-07 PROCEDURE — G8427 DOCREV CUR MEDS BY ELIG CLIN: HCPCS | Performed by: ORTHOPAEDIC SURGERY

## 2022-09-07 PROCEDURE — G8400 PT W/DXA NO RESULTS DOC: HCPCS | Performed by: ORTHOPAEDIC SURGERY

## 2022-09-07 PROCEDURE — G8420 CALC BMI NORM PARAMETERS: HCPCS | Performed by: ORTHOPAEDIC SURGERY

## 2022-09-07 PROCEDURE — 97110 THERAPEUTIC EXERCISES: CPT | Performed by: PHYSICAL THERAPIST

## 2022-09-07 NOTE — FLOWSHEET NOTE
reaching, carrying, lifting, house/yardwork, driving/computer work  [] (28831) Reviewed/Progressed HEP activities related to improving balance, coordination, kinesthetic sense, posture, motor skill, proprioception of scapular, scapulothoracic and UE control with self care, reaching, carrying, lifting, house/yardwork, driving/computer work      Manual Treatments:  PROM / STM / Oscillations-Mobs:  G-I, II, III, IV (PA's, Inf., Post.)  [x] (86800) Provided manual therapy to mobilize soft tissue/joints of cervical/CT, scapular GHJ and UE for the purpose of modulating pain, promoting relaxation,  increasing ROM, reducing/eliminating soft tissue swelling/inflammation/restriction, improving soft tissue extensibility and allowing for proper ROM for normal function with self care, reaching, carrying, lifting, house/yardwork, driving/computer work    Modalities:  declined    Charges  Timed Code Treatment Minutes: 36'   Total Treatment Minutes: 40'     [] EVAL (LOW) 455 1011   [] EVAL (MOD) 75319   [] EVAL (HIGH) 25785   [] RE-EVAL     [x] LR(28688) x 2    [] IONTO  [] NMR (23654) x     [] VASO  [x] Manual (46852) x 1     [] Other:  [] TA x      [] Mech Traction (91538)  [] ES(attended) (25817)      [] ES (un) (28292):     GOALS:  Patient stated goal: full use of her L UE  [] Progressing: [] Met: [] Not Met: [] Adjusted    Therapist goals for Patient:   Short Term Goals: To be achieved in: 2 weeks  1. Independent in HEP and progression per patient tolerance, in order to prevent re-injury. [] Progressing: [x] Met: [] Not Met: [] Adjusted  2. Patient will have a decrease in pain to facilitate improvement in movement, function, and ADLs as indicated by Functional Deficits. [] Progressing: [x] Met: [] Not Met: [] Adjusted    Long Term Goals: To be achieved in: 6 more weeks from 8/17/22 (9/28/22)  1. Disability index score of 58% or better for the FOTO shoulder to assist with reaching prior level of function.    [x] Progressing: [] complications  [] Other:     PLAN: 1x/wk for 6 more weeks  [] Continue per plan of care [] Alter current plan (see comments above)  [] Plan of care initiated [] Hold pending MD visit [x] Discharge pending MD visit today      Electronically signed by:  Hugo Burns, PT, DPT [id number]     Note: If patient does not return for scheduled/ recommended follow up visits, this note will serve as a discharge from care along with most recent update on progress. HEP instruction:   Access Code: [email or web address]  URL: [email or web address]  Date: 05/19/2022  Prepared by: Hugo Burns

## 2022-09-07 NOTE — PROGRESS NOTES
Chief Complaint    Follow-up (Left Shoulder)      History of Present Illness:  Huma Eaton is a pleasant, 78 y.o., female, here today for follow up of her left shoulder. She is 4.5 months out following a left reverse total shoulder replacement for a four-part fracture dislocation on 4/27/22. She has continued in physical therapy at the [facility] office. She feels she is progressing well. She has essentially no pain. She has gone back to her normal chores and hobbies. She does live by herself in an apartment and is able to care for herself and her home with no problems. She is very pleased with her overall recovery. She has no concerns today. She reports no new injuries or setbacks. Medical History:  Patient's medications, allergies, past medical, surgical, social and family histories were reviewed and updated as appropriate. Review of Systems  A 14 point review of systems was completed by the patient and is available in the media section of the scanned medical record and was reviewed on 9/7/2022. The review is negative with the exception of those things mentioned in the HPI and Past Medical History    Vital Signs:  Vitals:       General/Appearance: Alert and oriented and in no apparent distress. Skin:  There are no skin lesions, cellulitis, or extreme edema. The patient has warm and well-perfused Bilateral upper extremities with brisk capillary refill. Left Shoulder Exam:  Inspection: incision is healing well. No gross deformities, no signs of infection. Palpation: Non-tender to palpate    Active Range of Motion: Forward Elevation 110 vs 150 on the right, Abduction 100 vs 140, External Rotation 30 vs 50, Internal Rotation back pocket T11 vs T6    Passive Range of Motion: Forward Elevation 120    Strength: External Rotation 4/5 vs 4+/5 on the right, Internal Rotation 4/5, Champagne Toast 4-/5    Special Tests: Negative lag, No Philip muscle deformity.     Neurovascular: Sensation to light touch is intact, no motor deficits, palpable radial pulses 2+      Radiology:     No new XR obtained at this time. Assessment :  Ms. Pipo Perez is a pleasant, 78 y.o. patient who is 4.5 months out following a left reverse total shoulder replacement for a four-part fracture dislocation on 4/27/22. She continues to do exceedingly well. Self assessment questionnaires were completed today. Impression:  Encounter Diagnosis   Name Primary? Status post reverse total replacement of left shoulder Yes       Office Procedures:  Orders Placed This Encounter   Procedures    [facility] Physical [facility]     Referral Priority:   Routine     Referral Type:   Eval and Treat     Referral Reason:   Specialty Services Required     Requested Specialty:   Physical Therapist     Number of Visits Requested:   1         Treatment Plan:  Pipo Perez continues to do well in her recovery - we are pleased with her progress today. She may transition to a home-based program once goals are met. Today we feel confident releasing Pipo Perez from our care until the one year casey. She should continue her daily activities as tolerated with no real restrictions imposed. All questions were answered to patient's satisfaction and She was encouraged to call with any further questions or concerns. Pipo Perez is in agreement with this plan and she will follow-up at 1 year postoperative. 9/7/2022  1:47 PM    Jimmie Awan, ATC  [employer] [address]    During this examination, IDianna, functioned as a scribe for Dr. Nereida Zapata. The history taking and physical examination were performed by Dr. Nancy Phillip. All counseling during the appointment was performed between the patient and Dr. Nancy Phillip.  9/7/22  ______________  I, Dr. Nereida Zapata, personally performed the services described in this documentation as described by Santi Capellan ATC in my presence, and it is both accurate and complete. Dany Patel MD, PhD  9/7/2022

## 2022-09-26 ENCOUNTER — HOSPITAL ENCOUNTER (OUTPATIENT)
Dept: PHYSICAL THERAPY | Age: 82
Setting detail: THERAPIES SERIES
Discharge: HOME | End: 2022-09-26
Payer: MEDICARE

## 2022-09-26 PROCEDURE — 97110 THERAPEUTIC EXERCISES: CPT | Performed by: PHYSICAL THERAPIST

## 2022-09-26 PROCEDURE — 97140 MANUAL THERAPY 1/> REGIONS: CPT | Performed by: PHYSICAL THERAPIST

## 2022-09-26 NOTE — FLOWSHEET NOTE
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, s/p fall  Treatment Diagnosis: L shoulder pain Q02.497  Insurance/Certification information:  PT Insurance Information: [employer] [address], [place], $40 cp  Physician Information:   Dr Michael James  Has the plan of care been signed (Y/N):        [x]  Yes  []  No     Date of Patient follow up with Physician: 22      Is this a Progress Report:     [x]  Yes   []  No        If Yes:  Date Range for reporting period:  Beginning:   Ending:  Progress report will be due (10 Rx or 30 days whichever is less):       Recertification will be due (POC Duration  / 90 days whichever is less): 22     Visit # Insurance Allowable Auth Required   In-person 17  through 22 []  Yes []  No    Telehealth   []  Yes []  No    Total            Functional Scale: FOTO shoulder  57%  Date assessed:  22      Therapy Diagnosis/Practice Pattern:I, surgical      Number of Comorbidities:  []0     [x]1-2    []3+    Latex Allergy:  []NO      [x]YES  Preferred Language for Healthcare:   [x]English       []other:      Pain level: 0-1/10     SUBJECTIVE:  Patient reports that she saw the doctor who told her to continue PT to check in a few more visits. Reports that she had some pain and swelling about a week and a half ago and was not sure what caused it. OBJECTIVE:   Observation:   Test measurements:  Updated POC NV  RESTRICTIONS/PRECAUTIONS: see letter tab, AAROM flexion to 140, ER to 40, IR up the back; no IR strengthening until 13 weeks;  Latex Allergy    Exercises/Interventions:     Therapeutic Ex (01695) Sets/sec/Reps Notes/CUES   HEP, \"clock\" cues 3 to 9    HEP   HEP            Supine cane press 5# 2 x 15          Supine cane ER 5\" x 10    HEP, relax arm cues, improved   HEP w/ soft ball   Needs cueing   Standing ceiling punch 2# 2 x 10    Seated AROM flexion, scaption 2 x 10 ea 1# second set   Belt IR stretch 10\" x 10 GENTLE   Reviewed for form            Needs cueing   YTB IR 2 x 15    YTB ER 2 x 15 challenging   Wall slides 1/2 arc 10x ea R/L    Wall push ups 2 x 10    Patient ed  HEP moving forward   Manual Intervention (76413)     Gr II/III GH Jt mobilizations, PROM shoulder ER, flexion, and gentle abduction 10'    Rhythmic stabilization at 90 deg flexion 20\" x 3                        NMR re-education (83557)  CUES NEEDED                                                Therapeutic Activity (26516)                                     Therapeutic Exercise and NMR EXR  [x] (90126) Provided verbal/tactile cueing for activities related to strengthening, flexibility, endurance, ROM  for improvements in scapular, scapulothoracic and UE control with self care, reaching, carrying, lifting, house/yardwork, driving/computer work.    [] (26407) Provided verbal/tactile cueing for activities related to improving balance, coordination, kinesthetic sense, posture, motor skill, proprioception  to assist with  scapular, scapulothoracic and UE control with self care, reaching, carrying, lifting, house/yardwork, driving/computer work. Therapeutic Activities:    [] (66152 or 85096) Provided verbal/tactile cueing for activities related to improving balance, coordination, kinesthetic sense, posture, motor skill, proprioception and motor activation to allow for proper function of scapular, scapulothoracic and UE control with self care, carrying, lifting, driving/computer work.      Home Exercise Program:    [x] (80320) Reviewed/Progressed HEP activities related to strengthening, flexibility, endurance, ROM of scapular, scapulothoracic and UE control with self care, reaching, carrying, lifting, house/yardwork, driving/computer work  [] (93034) Reviewed/Progressed HEP activities related to improving balance, coordination, kinesthetic sense, posture, motor skill, proprioception of scapular, scapulothoracic and UE control with self care, reaching, carrying, lifting, house/yardwork, driving/computer work      Manual Treatments:  PROM / STM / Oscillations-Mobs:  G-I, II, III, IV (PA's, Inf., Post.)  [x] (62088) Provided manual therapy to mobilize soft tissue/joints of cervical/CT, scapular GHJ and UE for the purpose of modulating pain, promoting relaxation,  increasing ROM, reducing/eliminating soft tissue swelling/inflammation/restriction, improving soft tissue extensibility and allowing for proper ROM for normal function with self care, reaching, carrying, lifting, house/yardwork, driving/computer work    Modalities:  declined    Charges  Timed Code Treatment Minutes: 36'   Total Treatment Minutes: 40'     [] EVAL (LOW) 455 1011   [] EVAL (MOD) 49077   [] EVAL (HIGH) 93096   [] RE-EVAL     [x] LAM(04086) x 2    [] IONTO  [] NMR (48468) x     [] VASO  [x] Manual (17715) x 1     [] Other:  [] TA x      [] Mech Traction (38138)  [] ES(attended) (66845)      [] ES (un) (79719):     GOALS:  Patient stated goal: full use of her L UE  [] Progressing: [] Met: [] Not Met: [] Adjusted    Therapist goals for Patient:   Short Term Goals: To be achieved in: 2 weeks  1. Independent in HEP and progression per patient tolerance, in order to prevent re-injury. [] Progressing: [x] Met: [] Not Met: [] Adjusted  2. Patient will have a decrease in pain to facilitate improvement in movement, function, and ADLs as indicated by Functional Deficits. [] Progressing: [x] Met: [] Not Met: [] Adjusted    Long Term Goals: To be achieved in: 6 more weeks from 8/17/22 (9/28/22)  1.  Disability index score of 58% or better for the FOTO shoulder to assist with reaching prior level of function. [x] Progressing: [] Met: [] Not Met: [] Adjusted  2. Patient will demonstrate increased AROM to 150 deg elevation, IR to L5, ER to C5 to allow for proper joint functioning with dressing and ADLs. [x] Progressing: [] Met: [] Not Met: [] Adjusted  3. Patient will demonstrate an increase in Strength to grossly 4/5 L UE to allow for proper functional mobility with carrying groceries and laundry basket. [x] Progressing: [] Met: [] Not Met: [] Adjusted  4. Patient will return to over head reaching to put her dishes away functional activities without increased symptoms or restriction. [] Progressing: [x] Met: [] Not Met: [] Adjusted     Progression Towards Functional goals:  [x] Patient is progressing as expected towards functional goals listed. [] Progression is slowed due to complexities listed. [] Progression has been slowed due to co-morbidities. [] Plan just implemented, too soon to assess goals progression  [] Other:     ASSESSMENT: Patient with increased pain and swelling since last visit. Per MD, wants her to continue PT for a few more visits if she can. Would benefit from continued PT to work on strength and endurance while increasing functional ROM as able. Educated patient on icing and not increasing weight too much. Would prefer more repetitions vs heavier weight. Overall Progression Towards Functional goals/ Treatment Progress Update:  [x] Patient is progressing as expected towards functional goals listed. [] Progression is slowed due to complexities/Impairments listed. [] Progression has been slowed due to co-morbidities.   [] Plan just implemented, too soon to assess goals progression <30days   [] Goals require adjustment due to lack of progress  [] Patient is not progressing as expected and requires additional follow up with physician  [] Other    Prognosis for POC: [x] Good [] Fair  [] Poor      Patient requires continued skilled intervention: [x] Yes  [] No    Treatment/Activity Tolerance:  [x] Patient able to complete treatment  [] Patient limited by fatigue  [] Patient limited by pain    [] Patient limited by other medical complications  [] Other:     PLAN: 1x/wk for 6 more weeks  [x] Continue per plan of care [] Alter current plan (see comments above)  [] Plan of care initiated [] Hold pending MD visit [] Discharge       Electronically signed by:  Sonia Urena, PT, DPT [id number]     Note: If patient does not return for scheduled/ recommended follow up visits, this note will serve as a discharge from care along with most recent update on progress. HEP instruction:   Access Code: [email or web address]  URL: [email or web address]  Date: 05/19/2022  Prepared by: Sonia Urena

## 2022-10-05 ENCOUNTER — HOSPITAL ENCOUNTER (OUTPATIENT)
Dept: PHYSICAL THERAPY | Age: 82
Setting detail: THERAPIES SERIES
Discharge: HOME | End: 2022-10-05
Payer: MEDICARE

## 2022-10-05 PROCEDURE — 97140 MANUAL THERAPY 1/> REGIONS: CPT | Performed by: PHYSICAL THERAPIST

## 2022-10-05 PROCEDURE — 97110 THERAPEUTIC EXERCISES: CPT | Performed by: PHYSICAL THERAPIST

## 2022-10-05 NOTE — PLAN OF CARE
The [facility],[address], [address] [facility], [address] [place], [place]  Physical Therapy Re-Certification Plan of Justice Bui      Dear Dr. Keanu Lakhani ,    We had the pleasure of treating the following patient for physical therapy services at [address] [place] [address]. A summary of our findings can be found in the updated assessment below. This includes our plan of care. If you have any questions or concerns regarding these findings, please do not hesitate to contact me at the office phone number checked above.   Thank you for the referral.     Physician Signature:________________________________Date:__________________  By signing above (or electronic signature), therapists plan is approved by physician    Date Range Of Visits: 22 - 10/5/22  Total Visits to Date: 25  Overall Response to Treatment:   [x]Patient is responding well to treatment and improvement is noted with regards  to goals   []Patient should continue to improve in reasonable time if they continue HEP   []Patient has plateaued and is no longer responding to skilled PT intervention    []Patient is getting worse and would benefit from return to referring MD   []Patient unable to adhere to initial POC   []Other:       PT Daily Flowsheet/Progress Note    Date:  10/5/2022    Patient Name:  Marium Diaz    :  1940  MRN: [id number]  Restrictions/Precautions:    Medical/Treatment Diagnosis Information:  Diagnosis: W88.957 (ICD-10-CM) - 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, s/p fall  Treatment Diagnosis: L shoulder pain J36.710  Insurance/Certification information:  PT Insurance Information: [facility] [employer] auth, BMN, $40 cp  Physician Information:   Dr Keanu Lakhani  Has the plan of care been signed (Y/N):        [x]  Yes  []  No     Date of Patient follow up with Physician: 22      Is this a Progress Report:     [x]  Yes (see above)   [] No        If Yes:  Date Range for reporting period:  Beginning:   Ending:  Progress report will be due (10 Rx or 30 days whichever is less):       Recertification will be due (POC Duration  / 90 days whichever is less): 9/28/22     Visit # Insurance Allowable Auth Required   In-person 18 18/32 through 10/31/22 []  Yes []  No    Telehealth   []  Yes []  No    Total            Functional Scale: FOTO shoulder  58%  Date assessed:  10/5/22      Therapy Diagnosis/Practice Pattern:I, surgical      Number of Comorbidities:  []0     [x]1-2    []3+    Latex Allergy:  []NO      [x]YES  Preferred Language for Healthcare:   [x]English       []other:      Pain level: 0-1/10     SUBJECTIVE:  Patient reports that her shoulder has been doing pretty well. Had to get into a file cabinet and reach back in the back and it was an awkward movement but it did not hurt her shoulder. No swelling or any issues that was bothering her at last visit. Thinks she is still progressing with strength    OBJECTIVE:   Observation:   Test measurements:  AROM flexion 102, abduction 90, IR T10, ER to C5; MMT: fleixon, abduction, ER all 4-/5, IR 4/5  RESTRICTIONS/PRECAUTIONS: see letter tab, AAROM flexion to 140, ER to 40, IR up the back; no IR strengthening until 13 weeks;  Latex Allergy    Exercises/Interventions:     Therapeutic Ex (69496) Sets/sec/Reps Notes/CUES   HEP, \"clock\" cues 3 to 9    HEP   HEP            Supine cane press 7.5# 2 x 15          Supine cane ER 5\" x 10    HEP, relax arm cues, improved   HEP w/ soft ball   Needs cueing   Standing ceiling punch 2# 2 x 10    Seated AROM flexion full range  Scaption to 90 2 x 10  2 x 10   1# second set   Belt IR stretch 10\" x 10 GENTLE   Reviewed for form      Bicep curls 3# 2 x 10       Needs cueing   YTB IR 2 x 15    YTB ER 2 x 15 challenging   Wall slides 1/2 arc 10x ea R/L    Wall push ups 2 x 15    Patient ed  Ice, posture, FOTO score   Manual Intervention (82088)     Gr II/III GH ALANAt mobilizations, PROM shoulder ER, flexion, and gentle abduction 8'                           NMR re-education (87471)  CUES NEEDED                                                Therapeutic Activity (79125)                                     Therapeutic Exercise and NMR EXR  [x] (73235) Provided verbal/tactile cueing for activities related to strengthening, flexibility, endurance, ROM  for improvements in scapular, scapulothoracic and UE control with self care, reaching, carrying, lifting, house/yardwork, driving/computer work.    [] (44770) Provided verbal/tactile cueing for activities related to improving balance, coordination, kinesthetic sense, posture, motor skill, proprioception  to assist with  scapular, scapulothoracic and UE control with self care, reaching, carrying, lifting, house/yardwork, driving/computer work. Therapeutic Activities:    [] (12473 or 26190) Provided verbal/tactile cueing for activities related to improving balance, coordination, kinesthetic sense, posture, motor skill, proprioception and motor activation to allow for proper function of scapular, scapulothoracic and UE control with self care, carrying, lifting, driving/computer work.      Home Exercise Program:    [x] (66227) Reviewed/Progressed HEP activities related to strengthening, flexibility, endurance, ROM of scapular, scapulothoracic and UE control with self care, reaching, carrying, lifting, house/yardwork, driving/computer work  [] (02973) Reviewed/Progressed HEP activities related to improving balance, coordination, kinesthetic sense, posture, motor skill, proprioception of scapular, scapulothoracic and UE control with self care, reaching, carrying, lifting, house/yardwork, driving/computer work      Manual Treatments:  PROM / STM / Oscillations-Mobs:  G-I, II, III, IV (PA's, Inf., Post.)  [x] (40045) Provided manual therapy to mobilize soft tissue/joints of cervical/CT, scapular GHJ and UE for the purpose of modulating pain, promoting relaxation,  increasing ROM, reducing/eliminating soft tissue swelling/inflammation/restriction, improving soft tissue extensibility and allowing for proper ROM for normal function with self care, reaching, carrying, lifting, house/yardwork, driving/computer work    Modalities:  declined    Charges  Timed Code Treatment Minutes: 45'   Total Treatment Minutes: 45'     [] EVAL (LOW) 97550   [] EVAL (MOD) 62593   [] EVAL (HIGH) 29938   [] RE-EVAL     [x] VU(65522) x 2    [] IONTO  [] NMR (97490) x     [] VASO  [x] Manual (77742) x 1     [] Other:  [] TA x      [] Mech Traction (18882)  [] ES(attended) (09628)      [] ES (un) (89348):     GOALS:  Patient stated goal: full use of her L UE  [] Progressing: [] Met: [] Not Met: [] Adjusted    Therapist goals for Patient:   Short Term Goals: To be achieved in: 2 weeks  1. Independent in HEP and progression per patient tolerance, in order to prevent re-injury. [] Progressing: [x] Met: [] Not Met: [] Adjusted  2. Patient will have a decrease in pain to facilitate improvement in movement, function, and ADLs as indicated by Functional Deficits. [] Progressing: [x] Met: [] Not Met: [] Adjusted    Long Term Goals: To be achieved in: 1x/wk for 4 more weeks (11/2/22)  1. Disability index score of 58% or better for the FOTO shoulder to assist with reaching prior level of function. [x] Progressing: [] Met: [] Not Met: [] Adjusted  2. Patient will demonstrate increased AROM to 150 deg elevation, IR to L5, ER to C5 to allow for proper joint functioning with dressing and ADLs. [x] Progressing: [] Met: [] Not Met: [] Adjusted  3. Patient will demonstrate an increase in Strength to grossly 4/5 L UE to allow for proper functional mobility with carrying groceries and laundry basket. [x] Progressing: [] Met: [] Not Met: [] Adjusted  4. Patient will return to over head reaching to put her dishes away functional activities without increased symptoms or restriction.    [] Progressing: [x] Met: [] Not Met: [] Adjusted     Progression Towards Functional goals:  [x] Patient is progressing as expected towards functional goals listed. [] Progression is slowed due to complexities listed. [] Progression has been slowed due to co-morbidities. [] Plan just implemented, too soon to assess goals progression  [] Other:     ASSESSMENT: Patient with improved strength in her shoulder and small improvement in ROM since last POC update. Will continue to work on increasing strength and endurance over the next 2-3 weeks. Progressing well. Overall Progression Towards Functional goals/ Treatment Progress Update:  [x] Patient is progressing as expected towards functional goals listed. [] Progression is slowed due to complexities/Impairments listed. [] Progression has been slowed due to co-morbidities. [] Plan just implemented, too soon to assess goals progression <30days   [] Goals require adjustment due to lack of progress  [] Patient is not progressing as expected and requires additional follow up with physician  [] Other    Prognosis for POC: [x] Good [] Fair  [] Poor      Patient requires continued skilled intervention: [x] Yes  [] No    Treatment/Activity Tolerance:  [x] Patient able to complete treatment  [] Patient limited by fatigue  [] Patient limited by pain    [] Patient limited by other medical complications  [] Other:     PLAN: 1x/wk for 6 more weeks  [x] Continue per plan of care [] Alter current plan (see comments above)  [] Plan of care initiated [] Hold pending MD visit [] Discharge       Electronically signed by:  Dani Gracia, PT, DPT [id number]     Note: If patient does not return for scheduled/ recommended follow up visits, this note will serve as a discharge from care along with most recent update on progress. HEP instruction:   Access Code: [email or web address]  URL: [email or web address]  Date: 05/19/2022  Prepared by: Dani Gracia

## 2022-10-12 ENCOUNTER — HOSPITAL ENCOUNTER (OUTPATIENT)
Dept: PHYSICAL THERAPY | Age: 82
Setting detail: THERAPIES SERIES
Discharge: HOME | End: 2022-10-12
Payer: MEDICARE

## 2022-10-12 PROCEDURE — 97110 THERAPEUTIC EXERCISES: CPT | Performed by: PHYSICAL THERAPIST

## 2022-10-12 PROCEDURE — 97140 MANUAL THERAPY 1/> REGIONS: CPT | Performed by: PHYSICAL THERAPIST

## 2022-10-12 NOTE — PLAN OF CARE
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, s/p fall  Treatment Diagnosis: L shoulder pain W25.233  Insurance/Certification information:  PT Insurance Information: [employer] [address], BMN, $40 cp  Physician Information:   Dr Isaias Mayers  Has the plan of care been signed (Y/N):        [x]  Yes  []  No     Date of Patient follow up with Physician: 22      Is this a Progress Report:     [x]  Yes (see above)   []  No        If Yes:  Date Range for reporting period:  Beginning:   Ending:  Progress report will be due (10 Rx or 30 days whichever is less):       Recertification will be due (POC Duration  / 90 days whichever is less): 22     Visit # Insurance Allowable Auth Required   In-person  through 10/31/22 []  Yes []  No    Telehealth   []  Yes []  No    Total            Functional Scale: FOTO shoulder  58%  Date assessed:  10/5/22      Therapy Diagnosis/Practice Pattern:I, surgical      Number of Comorbidities:  []0     [x]1-2    []3+    Latex Allergy:  []NO      [x]YES  Preferred Language for Healthcare:   [x]English       []other:      Pain level: 0-1/10     SUBJECTIVE:  Patient reports that her shoulder feels good. Getting more confident. Has been lifting with 2# weight on her back at home. Still avoiding sidelying exercises due to vertigo, but able to do them standing. OBJECTIVE:   Observation:   Test measurements:  NT this date  RESTRICTIONS/PRECAUTIONS: see letter tab, AAROM flexion to 140, ER to 40, IR up the back; no IR strengthening until 13 weeks;  Latex Allergy    Exercises/Interventions:     Therapeutic Ex (95655) Sets/sec/Reps Notes/CUES   HEP, \"clock\" cues 3 to 9    HEP   HEP            Supine cane press 7.5# 2 x 15             HEP, relax arm cues, improved   HEP w/ soft ball   Needs cueing   Standing ceiling punch 2# 2 x 10    Seated AROM flexion full range 1#  Scaption to 90 1# 2 x 10  2 x 10    Belt IR stretch 10\" x 10 GENTLE   Reviewed for form      Bicep curls 3# 2 x 10       Needs cueing   YTB IR 2 x 15    YTB ER 2 x 15 challenging   Wall slides 1/2 arc 10x ea R/L    Wall push ups 2 x 15    Stability taps vs plinth 1 x 10 ea R/L    Patient ed  Ice, posture, FOTO score   Manual Intervention (26222)     Gr II/III GH Jt mobilizations, PROM shoulder ER, flexion, and gentle abduction 10'                           NMR re-education (55742)  CUES NEEDED                                                Therapeutic Activity (29769)                                     Therapeutic Exercise and NMR EXR  [x] (89337) Provided verbal/tactile cueing for activities related to strengthening, flexibility, endurance, ROM  for improvements in scapular, scapulothoracic and UE control with self care, reaching, carrying, lifting, house/yardwork, driving/computer work.    [] (89743) Provided verbal/tactile cueing for activities related to improving balance, coordination, kinesthetic sense, posture, motor skill, proprioception  to assist with  scapular, scapulothoracic and UE control with self care, reaching, carrying, lifting, house/yardwork, driving/computer work. Therapeutic Activities:    [] (06517 or 40874) Provided verbal/tactile cueing for activities related to improving balance, coordination, kinesthetic sense, posture, motor skill, proprioception and motor activation to allow for proper function of scapular, scapulothoracic and UE control with self care, carrying, lifting, driving/computer work.      Home Exercise Program:    [x] (42567) Reviewed/Progressed HEP activities related to strengthening, flexibility, endurance, ROM of scapular, scapulothoracic and UE control with self care, reaching, carrying, lifting, house/yardwork, driving/computer work  [] (81560) Reviewed/Progressed HEP activities related to improving balance, coordination, kinesthetic sense, posture, motor skill, proprioception of scapular, scapulothoracic and UE control with self care, reaching, carrying, lifting, house/yardwork, driving/computer work      Manual Treatments:  PROM / STM / Oscillations-Mobs:  G-I, II, III, IV (PA's, Inf., Post.)  [x] (86959) Provided manual therapy to mobilize soft tissue/joints of cervical/CT, scapular GHJ and UE for the purpose of modulating pain, promoting relaxation,  increasing ROM, reducing/eliminating soft tissue swelling/inflammation/restriction, improving soft tissue extensibility and allowing for proper ROM for normal function with self care, reaching, carrying, lifting, house/yardwork, driving/computer work    Modalities:  declined    Charges  Timed Code Treatment Minutes: 40'   Total Treatment Minutes: 40'     [] EVAL (LOW) 455 1011   [] EVAL (MOD) 64339   [] EVAL (HIGH) 01428   [] RE-EVAL     [x] WC(85081) x 2    [] IONTO  [] NMR (28740) x     [] VASO  [x] Manual (84290) x 1     [] Other:  [] TA x      [] Mech Traction (76307)  [] ES(attended) (80100)      [] ES (un) (71225):     GOALS:  Patient stated goal: full use of her L UE  [] Progressing: [] Met: [] Not Met: [] Adjusted    Therapist goals for Patient:   Short Term Goals: To be achieved in: 2 weeks  1. Independent in HEP and progression per patient tolerance, in order to prevent re-injury. [] Progressing: [x] Met: [] Not Met: [] Adjusted  2. Patient will have a decrease in pain to facilitate improvement in movement, function, and ADLs as indicated by Functional Deficits. [] Progressing: [x] Met: [] Not Met: [] Adjusted    Long Term Goals: To be achieved in: 1x/wk for 4 more weeks (11/2/22)  1.  Disability index score of 58% or better for the FOTO shoulder to assist with reaching prior level of function. [x] Progressing: [] Met: [] Not Met: [] Adjusted  2. Patient will demonstrate increased AROM to 150 deg elevation, IR to L5, ER to C5 to allow for proper joint functioning with dressing and ADLs. [x] Progressing: [] Met: [] Not Met: [] Adjusted  3. Patient will demonstrate an increase in Strength to grossly 4/5 L UE to allow for proper functional mobility with carrying groceries and laundry basket. [x] Progressing: [] Met: [] Not Met: [] Adjusted  4. Patient will return to over head reaching to put her dishes away functional activities without increased symptoms or restriction. [] Progressing: [x] Met: [] Not Met: [] Adjusted     Progression Towards Functional goals:  [x] Patient is progressing as expected towards functional goals listed. [] Progression is slowed due to complexities listed. [] Progression has been slowed due to co-morbidities. [] Plan just implemented, too soon to assess goals progression  [] Other:     ASSESSMENT: Patient able to perform standing flexion and abduction with 1# weight today. Discussed gradual increase in weight and that she will reach a plateau at some point. Discussed the importance of function vs lifting heavier weights in order to avoid overexertion. Overall Progression Towards Functional goals/ Treatment Progress Update:  [x] Patient is progressing as expected towards functional goals listed. [] Progression is slowed due to complexities/Impairments listed. [] Progression has been slowed due to co-morbidities.   [] Plan just implemented, too soon to assess goals progression <30days   [] Goals require adjustment due to lack of progress  [] Patient is not progressing as expected and requires additional follow up with physician  [] Other    Prognosis for POC: [x] Good [] Fair  [] Poor      Patient requires continued skilled intervention: [x] Yes  [] No    Treatment/Activity Tolerance:  [x] Patient able to complete treatment  [] Patient limited by fatigue  [] Patient limited by pain    [] Patient limited by other medical complications  [] Other:     PLAN: 1x/wk for 6 more weeks  [x] Continue per plan of care [] Alter current plan (see comments above)  [] Plan of care initiated [] Hold pending MD visit [] Discharge       Electronically signed by:  Chepe Arriaza, PT, DPT [id number]     Note: If patient does not return for scheduled/ recommended follow up visits, this note will serve as a discharge from care along with most recent update on progress. HEP instruction:   Access Code: [email or web address]  URL: [email or web address]  Date: 05/19/2022  Prepared by: Chepe Arriaza

## 2022-10-19 ENCOUNTER — HOSPITAL ENCOUNTER (OUTPATIENT)
Dept: PHYSICAL THERAPY | Age: 82
Setting detail: THERAPIES SERIES
Discharge: HOME | End: 2022-10-19
Payer: MEDICARE

## 2022-10-19 PROCEDURE — 97140 MANUAL THERAPY 1/> REGIONS: CPT | Performed by: PHYSICAL THERAPIST

## 2022-10-19 PROCEDURE — 97110 THERAPEUTIC EXERCISES: CPT | Performed by: PHYSICAL THERAPIST

## 2022-10-19 NOTE — FLOWSHEET NOTE
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, s/p fall  Treatment Diagnosis: L shoulder pain Y87.545  Insurance/Certification information:  PT Insurance Information: [employer]  [address], BMN, $40 cp  Physician Information:   Dr Kiara Fitzgerald  Has the plan of care been signed (Y/N):        [x]  Yes  []  No     Date of Patient follow up with Physician: 22      Is this a Progress Report:     []  Yes (see above)   [x]  No        If Yes:  Date Range for reporting period:  Beginning:   Ending:  Progress report will be due (10 Rx or 30 days whichever is less):       Recertification will be due (POC Duration  / 90 days whichever is less): 22     Visit # Insurance Allowable Auth Required   In-person  through 10/31/22 []  Yes []  No    Telehealth   []  Yes []  No    Total            Functional Scale: FOTO shoulder  58%  Date assessed:  10/5/22      Therapy Diagnosis/Practice Pattern:I, surgical      Number of Comorbidities:  []0     [x]1-2    []3+    Latex Allergy:  []NO      [x]YES  Preferred Language for Healthcare:   [x]English       []other:      Pain level: 0/10     SUBJECTIVE:  Patient reports that her shoulder feels good today. Only mild soreness. Wants to review cane stretch. OBJECTIVE:   Observation:   Test measurements:  NT this date  RESTRICTIONS/PRECAUTIONS: see letter tab, AAROM flexion to 140, ER to 40, IR up the back; no IR strengthening until 13 weeks;  Latex Allergy    Exercises/Interventions:     Therapeutic Ex (71294) Sets/sec/Reps Notes/CUES   HEP, \"clock\" cues 3 to 9    HEP   HEP            Supine cane press 7.5# 2 x 15          Supine cane ER 5\" x 10 Review for HEP   HEP, relax arm cues, improved   HEP w/ soft ball   Needs cueing   Standing ceiling punch 2# 2 x 10    Seated AROM flexion full range 1#  Scaption to 90 1# 2 x 10  2 x 10    Belt IR stretch 10\" x 10 GENTLE   Reviewed for form            Needs cueing   YTB IR 2 x 15    YTB ER 2 x 15 challenging      MB lift and carry  1 lap x 2    Wall push ups 2 x 15    Stability taps vs plinth 1 x 10 ea R/L    Ball on wall 15x ea Light yellow ball        Manual Intervention (08359)     Gr II/III GH Jt mobilizations, PROM shoulder ER, flexion, and gentle abduction 10'    Rhythmic stabilization at 90 deg flexion 20\" x 3                        NMR re-education (57703)  CUES NEEDED                                                Therapeutic Activity (89363)                                     Therapeutic Exercise and NMR EXR  [x] (99741) Provided verbal/tactile cueing for activities related to strengthening, flexibility, endurance, ROM  for improvements in scapular, scapulothoracic and UE control with self care, reaching, carrying, lifting, house/yardwork, driving/computer work.    [] (50408) Provided verbal/tactile cueing for activities related to improving balance, coordination, kinesthetic sense, posture, motor skill, proprioception  to assist with  scapular, scapulothoracic and UE control with self care, reaching, carrying, lifting, house/yardwork, driving/computer work. Therapeutic Activities:    [] (85839 or 60138) Provided verbal/tactile cueing for activities related to improving balance, coordination, kinesthetic sense, posture, motor skill, proprioception and motor activation to allow for proper function of scapular, scapulothoracic and UE control with self care, carrying, lifting, driving/computer work.      Home Exercise Program:    [x] (03177) Reviewed/Progressed HEP activities related to strengthening, flexibility, endurance, ROM of scapular, scapulothoracic and UE control with self care, reaching, carrying, lifting, house/yardwork, driving/computer work  [] (12493) Reviewed/Progressed HEP activities related to improving balance, coordination, kinesthetic sense, posture, motor skill, proprioception of scapular, scapulothoracic and UE control with self care, reaching, carrying, lifting, house/yardwork, driving/computer work      Manual Treatments:  PROM / STM / Oscillations-Mobs:  G-I, II, III, IV (PA's, Inf., Post.)  [x] (76865) Provided manual therapy to mobilize soft tissue/joints of cervical/CT, scapular GHJ and UE for the purpose of modulating pain, promoting relaxation,  increasing ROM, reducing/eliminating soft tissue swelling/inflammation/restriction, improving soft tissue extensibility and allowing for proper ROM for normal function with self care, reaching, carrying, lifting, house/yardwork, driving/computer work    Modalities:  declined    Charges  Timed Code Treatment Minutes: 36'   Total Treatment Minutes: 40'     [] EVAL (LOW) 455 1011   [] EVAL (MOD) 19480   [] EVAL (HIGH) 95053   [] RE-EVAL     [x] PJ(02987) x 2    [] IONTO  [] NMR (23270) x     [] VASO  [x] Manual (19935) x 1     [] Other:  [] TA x      [] Mech Traction (08782)  [] ES(attended) (26438)      [] ES (un) (51190):     GOALS:  Patient stated goal: full use of her L UE  [] Progressing: [] Met: [] Not Met: [] Adjusted    Therapist goals for Patient:   Short Term Goals: To be achieved in: 2 weeks  1. Independent in HEP and progression per patient tolerance, in order to prevent re-injury. [] Progressing: [x] Met: [] Not Met: [] Adjusted  2. Patient will have a decrease in pain to facilitate improvement in movement, function, and ADLs as indicated by Functional Deficits. [] Progressing: [x] Met: [] Not Met: [] Adjusted    Long Term Goals: To be achieved in: 1x/wk for 4 more weeks (11/2/22)  1.  Disability index score of 58% or better for the FOTO shoulder to assist with reaching prior level of function. [x] Progressing: [] Met: [] Not Met: [] Adjusted  2. Patient will demonstrate increased AROM to 150 deg elevation, IR to L5, ER to C5 to allow for proper joint functioning with dressing and ADLs. [x] Progressing: [] Met: [] Not Met: [] Adjusted  3. Patient will demonstrate an increase in Strength to grossly 4/5 L UE to allow for proper functional mobility with carrying groceries and laundry basket. [x] Progressing: [] Met: [] Not Met: [] Adjusted  4. Patient will return to over head reaching to put her dishes away functional activities without increased symptoms or restriction. [] Progressing: [x] Met: [] Not Met: [] Adjusted     Progression Towards Functional goals:  [x] Patient is progressing as expected towards functional goals listed. [] Progression is slowed due to complexities listed. [] Progression has been slowed due to co-morbidities. [] Plan just implemented, too soon to assess goals progression  [] Other:     ASSESSMENT: Patient progressing well at this time. Did well with carrying and walking objects, with no LOB noted. Will DC with HEP NV. Overall Progression Towards Functional goals/ Treatment Progress Update:  [x] Patient is progressing as expected towards functional goals listed. [] Progression is slowed due to complexities/Impairments listed. [] Progression has been slowed due to co-morbidities.   [] Plan just implemented, too soon to assess goals progression <30days   [] Goals require adjustment due to lack of progress  [] Patient is not progressing as expected and requires additional follow up with physician  [] Other    Prognosis for POC: [x] Good [] Fair  [] Poor      Patient requires continued skilled intervention: [x] Yes  [] No    Treatment/Activity Tolerance:  [x] Patient able to complete treatment  [] Patient limited by fatigue  [] Patient limited by pain    [] Patient limited by other medical complications  [] Other:     PLAN: 1x/wk for 6 more weeks  [x] Continue per plan of care [] Alter current plan (see comments above)  [] Plan of care initiated [] Hold pending MD visit [] Discharge       Electronically signed by:  Phil Kellogg, PT, DPT [id number]     Note: If patient does not return for scheduled/ recommended follow up visits, this note will serve as a discharge from care along with most recent update on progress. HEP instruction:   Access Code: [email or web address]  URL: [email or web address]  Date: 05/19/2022  Prepared by: Phil Kellogg

## 2022-10-26 ENCOUNTER — HOSPITAL ENCOUNTER (OUTPATIENT)
Dept: PHYSICAL THERAPY | Age: 82
Setting detail: THERAPIES SERIES
Discharge: HOME | End: 2022-10-26
Payer: MEDICARE

## 2022-10-26 PROCEDURE — 97110 THERAPEUTIC EXERCISES: CPT | Performed by: PHYSICAL THERAPIST

## 2022-10-26 PROCEDURE — 97140 MANUAL THERAPY 1/> REGIONS: CPT | Performed by: PHYSICAL THERAPIST

## 2022-10-26 NOTE — FLOWSHEET NOTE
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, s/p fall  Treatment Diagnosis: L shoulder pain K10.087  Insurance/Certification information:  PT Insurance Information: [employer] [address], BMN, $40 cp  Physician Information:   Dr Lynda Rolon  Has the plan of care been signed (Y/N):        [x]  Yes  []  No     Date of Patient follow up with Physician: 22      Is this a Progress Report:     []  Yes (see above)   [x]  No        If Yes:  Date Range for reporting period:  Beginning:   Ending:  Progress report will be due (10 Rx or 30 days whichever is less):       Recertification will be due (POC Duration  / 90 days whichever is less): 22     Visit # Insurance Allowable Auth Required   In-person  through 10/31/22 []  Yes []  No    Telehealth   []  Yes []  No    Total            Functional Scale: FOTO shoulder  59%  Date assessed:  10/26/22      Therapy Diagnosis/Practice Pattern:I, surgical      Number of Comorbidities:  []0     [x]1-2    []3+    Latex Allergy:  []NO      [x]YES  Preferred Language for Healthcare:   [x]English       []other:      Pain level: 0/10     SUBJECTIVE:  Patient reports that her shoulder is doing well. Happy with her results in PT.    OBJECTIVE:   Observation:   Test measurements:  AROM flexion 100, abduction 93, IR T10, ER to C5, MMT: ER and IR 4/5, flexion and abduction 4-/5  RESTRICTIONS/PRECAUTIONS: see letter tab, AAROM flexion to 140, ER to 40, IR up the back; no IR strengthening until 13 weeks;  Latex Allergy    Exercises/Interventions:     Therapeutic Ex (96507) Sets/sec/Reps Notes/CUES   HEP, \"clock\" cues 3 to 9    HEP   HEP            Supine cane press 7.5# 2 x 15          Supine cane ER 5\" x 5 Review for HEP   HEP, relax arm cues, improved   HEP w/ soft ball   Needs cueing   Standing ceiling punch 2# 2 x 10    Seated AROM flexion full range 1#  Scaption to 90 1# 2 x 10  2 x 10    Belt IR stretch 10\" x 10 GENTLE   Reviewed for form            Needs cueing   RTB IR 2 x 15    YTB ER 2 x 15 challenging      MB lift and carry  1 lap x 2    Wall push ups 2 x 15    Stability taps vs plinth 1 x 10 ea R/L    Light yellow ball   Patient ed  FOTO review, HEP moving forward   Manual Intervention (06077)     Gr II/III GH Jt mobilizations, PROM shoulder ER, flexion, and gentle abduction 10'                         NMR re-education (16395)  CUES NEEDED                                                Therapeutic Activity (10381)                                     Therapeutic Exercise and NMR EXR  [x] (85673) Provided verbal/tactile cueing for activities related to strengthening, flexibility, endurance, ROM  for improvements in scapular, scapulothoracic and UE control with self care, reaching, carrying, lifting, house/yardwork, driving/computer work.    [] (82841) Provided verbal/tactile cueing for activities related to improving balance, coordination, kinesthetic sense, posture, motor skill, proprioception  to assist with  scapular, scapulothoracic and UE control with self care, reaching, carrying, lifting, house/yardwork, driving/computer work. Therapeutic Activities:    [] (07440 or 17783) Provided verbal/tactile cueing for activities related to improving balance, coordination, kinesthetic sense, posture, motor skill, proprioception and motor activation to allow for proper function of scapular, scapulothoracic and UE control with self care, carrying, lifting, driving/computer work.      Home Exercise Program:    [x] (32291) Reviewed/Progressed HEP activities related to strengthening, flexibility, endurance, ROM of scapular, scapulothoracic and UE control with self care, reaching, carrying, lifting, house/yardwork, driving/computer work  [] (62044) Reviewed/Progressed HEP activities related to improving balance, coordination, kinesthetic sense, posture, motor skill, proprioception of scapular, scapulothoracic and UE control with self care, reaching, carrying, lifting, house/yardwork, driving/computer work      Manual Treatments:  PROM / STM / Oscillations-Mobs:  G-I, II, III, IV (PA's, Inf., Post.)  [x] (34651) Provided manual therapy to mobilize soft tissue/joints of cervical/CT, scapular GHJ and UE for the purpose of modulating pain, promoting relaxation,  increasing ROM, reducing/eliminating soft tissue swelling/inflammation/restriction, improving soft tissue extensibility and allowing for proper ROM for normal function with self care, reaching, carrying, lifting, house/yardwork, driving/computer work    Modalities:  declined    Charges  Timed Code Treatment Minutes: 40'   Total Treatment Minutes: 40'     [] EVAL (LOW) 455 1011   [] EVAL (MOD) 64669   [] EVAL (HIGH) 84805   [] RE-EVAL     [x] KW(79742) x 2    [] IONTO  [] NMR (33188) x     [] VASO  [x] Manual (49961) x 1     [] Other:  [] TA x      [] Mech Traction (34078)  [] ES(attended) (46557)      [] ES (un) (54249):     GOALS:  Patient stated goal: full use of her L UE  [] Progressing: [] Met: [] Not Met: [] Adjusted    Therapist goals for Patient:   Short Term Goals: To be achieved in: 2 weeks  1. Independent in HEP and progression per patient tolerance, in order to prevent re-injury. [] Progressing: [x] Met: [] Not Met: [] Adjusted  2. Patient will have a decrease in pain to facilitate improvement in movement, function, and ADLs as indicated by Functional Deficits. [] Progressing: [x] Met: [] Not Met: [] Adjusted    Long Term Goals: To be achieved in: 1x/wk for 4 more weeks (11/2/22)  1.  Disability index score of 58% or better for the FOTO shoulder to assist with reaching prior level of function. [] Progressing: [x] Met: [] Not Met: [] Adjusted  2. Patient will demonstrate increased AROM to 150 deg elevation, IR to L5, ER to C5 to allow for proper joint functioning with dressing and ADLs. [x] Progressing: [] Met: [] Not Met: [] Adjusted  3. Patient will demonstrate an increase in Strength to grossly 4/5 L UE to allow for proper functional mobility with carrying groceries and laundry basket. [] Progressing: [x] Partially Met: [] Not Met: [] Adjusted  4. Patient will return to over head reaching to put her dishes away functional activities without increased symptoms or restriction. [] Progressing: [x] Met: [] Not Met: [] Adjusted     Progression Towards Functional goals:  [x] Patient is progressing as expected towards functional goals listed. [] Progression is slowed due to complexities listed. [] Progression has been slowed due to co-morbidities. [] Plan just implemented, too soon to assess goals progression  [] Other:     ASSESSMENT: Patient has met or partially met near all goals set in PT. Is appropriate for DC with HEP this date. Educated on the importance of maintaining gains in PT for long term function of her shoulder. Patient verbalized understanding. Overall Progression Towards Functional goals/ Treatment Progress Update:  [x] Patient is progressing as expected towards functional goals listed. [] Progression is slowed due to complexities/Impairments listed. [] Progression has been slowed due to co-morbidities.   [] Plan just implemented, too soon to assess goals progression <30days   [] Goals require adjustment due to lack of progress  [] Patient is not progressing as expected and requires additional follow up with physician  [] Other    Prognosis for POC: [x] Good [] Fair  [] Poor      Patient requires continued skilled intervention: [x] Yes  [] No    Treatment/Activity Tolerance:  [x] Patient able to complete treatment  [] Patient limited by fatigue  [] Patient limited by pain    [] Patient limited by other medical complications  [] Other:     PLAN: 1x/wk for 6 more weeks  [] Continue per plan of care [] Alter current plan (see comments above)  [] Plan of care initiated [] Hold pending MD visit [x] Discharge       Electronically signed by:  Oralia Carter, PT, DPT [id number]     Note: If patient does not return for scheduled/ recommended follow up visits, this note will serve as a discharge from care along with most recent update on progress. HEP instruction:   Access Code: [email or web address]  URL: [email or web address]  Date: 05/19/2022  Prepared by: Oralia Carter

## 2023-05-03 ENCOUNTER — OFFICE VISIT (OUTPATIENT)
Dept: ORTHOPEDIC SURGERY | Age: 83
End: 2023-05-03

## 2023-05-03 VITALS — BODY MASS INDEX: 19.14 KG/M2 | HEIGHT: 63 IN | WEIGHT: 108 LBS

## 2023-05-03 DIAGNOSIS — Z96.612 STATUS POST REVERSE TOTAL REPLACEMENT OF LEFT SHOULDER: Primary | ICD-10-CM

## 2023-05-03 NOTE — PROGRESS NOTES
[facility]  History and Physical  Shoulder Pain    Date:  5/3/2023    Name:  Jonathon Ralph  Address:  [address] [place] [address] [address]    :  1940      Age:   78 y.o.    SSN:  [id number]      Medical Record Number:  [id number]    Reason for Visit:    Follow-up (Left Shoulder)      HPI:   Jonathon Ralph is a 78 y.o. female who presents to our office today for follow up of the left shoulder pain. This patient is a year out from her left reverse total shoulder arthroplasty which was done for a four-part fracture dislocation. Surgery date was 2022. Overall patient reports she has no pain and essentially has function back in her left shoulder now. She reports she lives independently in an apartment and is able to do all her normal day-to-day activities on her own without any issues. She denies any new injuries or setbacks. Overall she is very happy and pleased with her left shoulder. No notes on file    Review of Systems:  A 14 point review of systems available in the scanned medical record as documented by the patient and reviewed on 5/3/2023. The review is negative with the exception of those things mentioned in the History of Present Illness and Past Medical History. Past Medical History:  Patient's medications, allergies, past medical, surgical, social and family histories were reviewed and updated as appropriate. Allergies: Allergies   Allergen Reactions    Latex Other (See Comments)    Penicillins        Physical Exam:  There were no vitals filed for this visit. General: Jonathon Ralph is a healthy and well appearing 78 y.o. female who is sitting comfortably in our office in acute distress. Skin:  There are no skin lesions, cellulitis, or extreme edema. The patient has warm and well-perfused Bilateral upper extremities with brisk capillary refill.     Eyes: Extra-ocular

## 2024-05-07 ENCOUNTER — OFFICE VISIT (OUTPATIENT)
Dept: ORTHOPEDIC SURGERY | Age: 84
End: 2024-05-07
Payer: MEDICARE

## 2024-05-07 VITALS — HEIGHT: 63 IN | BODY MASS INDEX: 19.13 KG/M2

## 2024-05-07 DIAGNOSIS — Z96.612 STATUS POST REVERSE TOTAL REPLACEMENT OF LEFT SHOULDER: Primary | ICD-10-CM

## 2024-05-07 PROCEDURE — 1123F ACP DISCUSS/DSCN MKR DOCD: CPT | Performed by: ORTHOPAEDIC SURGERY

## 2024-05-07 PROCEDURE — 1036F TOBACCO NON-USER: CPT | Performed by: ORTHOPAEDIC SURGERY

## 2024-05-07 PROCEDURE — 1090F PRES/ABSN URINE INCON ASSESS: CPT | Performed by: ORTHOPAEDIC SURGERY

## 2024-05-07 PROCEDURE — G8420 CALC BMI NORM PARAMETERS: HCPCS | Performed by: ORTHOPAEDIC SURGERY

## 2024-05-07 PROCEDURE — G8427 DOCREV CUR MEDS BY ELIG CLIN: HCPCS | Performed by: ORTHOPAEDIC SURGERY

## 2024-05-07 PROCEDURE — 99213 OFFICE O/P EST LOW 20 MIN: CPT | Performed by: ORTHOPAEDIC SURGERY

## 2024-05-07 PROCEDURE — G8400 PT W/DXA NO RESULTS DOC: HCPCS | Performed by: ORTHOPAEDIC SURGERY

## 2024-05-07 NOTE — PROGRESS NOTES
XR SHOULDER LEFT (MIN 2 VIEWS)     Standing Status:   Future     Number of Occurrences:   1     Standing Expiration Date:   5/7/2025     Order Specific Question:   Reason for exam:     Answer:   F/U       Plan:   Dulce Eaton was told that her shoulder remains stable and has good function.  The patient may continue to activities as tolerated.  She was encouraged to keep up with her home exercise program.  We would like to see her back at her 3-year postop visit which will be a year from now.  We will see her back sooner should she have any questions or concerns.  A self-assessment questionnaire was emailed out to the patient today.  She was in agreement with the plan and all questions were answered to the patient's satisfaction.  She was encouraged to call with any questions.    Greater than 20 minutes were expended on all aspects of today's visit including documentation, but excluding any separately billable procedures.    5/7/2024  10:05 AM      Sushma Nagar, PA-C  Orthopaedic Sports Medicine Physician Assistant    During this examination, I, Sushma Nagar, PA-C, functioned as a scribe for Dr. Dany Ness.     This dictation was performed with a verbal recognition program (DRAGON) and it was checked for errors.  It is possible that there are still dictated errors within this office note.  If so, please bring any errors to my attention for an addendum.  All efforts were made to ensure that this office note is accurate.  ____________  I, Dr. Dany Ness, personally performed the services described in this documentation as described by Sushma Nagar, PA-C in my presence, and it is both accurate and complete.    Dany Ness MD, PhD  5/7/2024

## 2025-05-07 ENCOUNTER — OFFICE VISIT (OUTPATIENT)
Dept: ORTHOPEDIC SURGERY | Age: 85
End: 2025-05-07
Payer: MEDICARE

## 2025-05-07 VITALS — HEIGHT: 63 IN | WEIGHT: 108 LBS | BODY MASS INDEX: 19.14 KG/M2

## 2025-05-07 DIAGNOSIS — Z96.612 STATUS POST REVERSE TOTAL REPLACEMENT OF LEFT SHOULDER: Primary | ICD-10-CM

## 2025-05-07 PROCEDURE — 1159F MED LIST DOCD IN RCRD: CPT | Performed by: PHYSICIAN ASSISTANT

## 2025-05-07 PROCEDURE — 99213 OFFICE O/P EST LOW 20 MIN: CPT | Performed by: PHYSICIAN ASSISTANT

## 2025-05-07 PROCEDURE — 1090F PRES/ABSN URINE INCON ASSESS: CPT | Performed by: PHYSICIAN ASSISTANT

## 2025-05-07 PROCEDURE — G8427 DOCREV CUR MEDS BY ELIG CLIN: HCPCS | Performed by: PHYSICIAN ASSISTANT

## 2025-05-07 PROCEDURE — 1036F TOBACCO NON-USER: CPT | Performed by: PHYSICIAN ASSISTANT

## 2025-05-07 PROCEDURE — 1123F ACP DISCUSS/DSCN MKR DOCD: CPT | Performed by: PHYSICIAN ASSISTANT

## 2025-05-07 PROCEDURE — G8420 CALC BMI NORM PARAMETERS: HCPCS | Performed by: PHYSICIAN ASSISTANT

## 2025-05-07 RX ORDER — LEVOTHYROXINE SODIUM 88 UG/1
88 TABLET ORAL
COMMUNITY
Start: 2024-09-05

## 2025-05-07 RX ORDER — LATANOPROST 50 UG/ML
1 SOLUTION/ DROPS OPHTHALMIC
COMMUNITY

## 2025-05-07 NOTE — PROGRESS NOTES
Bilateral upper extremities with brisk capillary refill.    Eyes: Extra-ocular muscles intact  Mouth: Oral mucosa moist. No perioral lesions  Pulm: Respirations unlabored and regular.  Neuro: Alert and oriented x3    left Shoulder Exam:  Inspection:  No gross deformities, no signs of infection.    Palpation:  There is no crepitus.     Active Range of Motion: Forward elevation of 100 vs 160, abduction of 100, external rotation with elbow at the side 30 vs 50, internal rotation to the back is T10 vs T6    Passive Range of Motion: Passively forward elevation can be further increased to 110.    Strength: deferred    Special Tests:   No Philip muscle deformity.    Neurovascular: Sensation to light touch is intact, no motor deficits, palpable radial pulses 2+    Additional Examinations:    Examination of the contralateral extremity does not show any tenderness, deformity or injury. Range of motion is unremarkable. There is no gross instability. There are no rashes, ulcerations or lesions. Strength and tone are normal.      Radiographic:  3 xray views of the left  shoulder including True AP in internal and external and axillary lateral were taken in our office today reveals a reverse total shoulder arthroplasty.  All components are in good placement.  No fractures, dislocations, visible tumors, or signs of acute trauma.    Self assessment questionnaires including ASES and Simple Shoulder Test was emailed to the patient today.     Assessment:  Dulce MCKEON Uma is a 84 y.o. female who is 3 years postop from a left reverse total shoulder arthroplasty that was done for a four-part fracture dislocation.  Surgery date was 4/77/2022 and continues to do well today.    Impression:  Encounter Diagnosis   Name Primary?    Status post reverse total replacement of left shoulder Yes       Office Procedures:  Orders Placed This Encounter   Procedures    XR SHOULDER LEFT (MIN 2 VIEWS)     Standing Status:   Future     Number of

## (undated) DEVICE — HAND: Brand: MEDLINE INDUSTRIES, INC.

## (undated) DEVICE — TOTAL SHOULDER: Brand: MEDLINE INDUSTRIES, INC.

## (undated) DEVICE — EYE PROTECTOR FOAM MEDICHOICE

## (undated) DEVICE — SPLINT ORTH W3XL15IN PLSTR OF PARIS LO EXOTHERM SMOOTH

## (undated) DEVICE — GLOVE SURG SZ 8 L12IN FNGR THK75MIL WHT LTX POLYMER BEAD

## (undated) DEVICE — KERLIX STER GAUZ 225INX3YDS/RL

## (undated) DEVICE — TOWEL,STOP FLAG GOLD N-W: Brand: MEDLINE

## (undated) DEVICE — UNDERGLOVE SURG SZ 8 BLU LTX FREE SYN POLYISOPRENE POLYMER

## (undated) DEVICE — SUTURE ETHLN SZ 5-0 L18IN NONABSORBABLE BLK P-3 L13MM 3/8 698G

## (undated) DEVICE — NEEDLE HYPO 22GA L1.5IN BLK POLYPR HUB S STL REG BVL STR

## (undated) DEVICE — TUBING SUCT 10FR MAL ALUM SHFT FN CAP VENT UNIV CONN W/ OBT

## (undated) DEVICE — NEEDLE SUT SZ 4 MAYO CATGUT 1/2 CIR TAPR PNT DISP

## (undated) DEVICE — GAUZE,SPONGE,4"X4",16PLY,XRAY,STRL,LF: Brand: MEDLINE

## (undated) DEVICE — GENERAL: Brand: MEDLINE INDUSTRIES, INC.

## (undated) DEVICE — SST TWIST DRILL, STANDARD, 2.4MM DIA. X 127MM: Brand: MICROAIRE®

## (undated) DEVICE — SUTURE VCRL SZ 0 L18IN ABSRB UD L36MM CT-1 1/2 CIR J840D

## (undated) DEVICE — INTENDED FOR TISSUE SEPARATION, AND OTHER PROCEDURES THAT REQUIRE A SHARP SURGICAL BLADE TO PUNCTURE OR CUT.: Brand: BARD-PARKER ® CARBON RIB-BACK BLADES

## (undated) DEVICE — SPONGE GZ W4XL8IN COT WVN 12 PLY

## (undated) DEVICE — STOCKINETTE ORTH W9XL36IN COT 2 PLY HLLW FOR HANDLING LMB

## (undated) DEVICE — PACK,SHOULDER II,DRAPE: Brand: MEDLINE

## (undated) DEVICE — COVER,MAYO STAND,XL,STERILE: Brand: MEDLINE

## (undated) DEVICE — Device

## (undated) DEVICE — DRESSING PETRO W3XL8IN N ADH KNIT CELOS ACETT ADPTC

## (undated) DEVICE — SUTURE VCRL UD BR CT 3-0 18IN CT1 J838D

## (undated) DEVICE — COVER LT HNDL BLU PLAS

## (undated) DEVICE — DRAPE ADAPTABLE ARM POSITIONER

## (undated) DEVICE — GOWN,SIRUS,POLYRNF,BRTHSLV,XLN/XXL,18/CS: Brand: MEDLINE

## (undated) DEVICE — TUBING, SUCTION, 1/4" X 12', STRAIGHT: Brand: MEDLINE

## (undated) DEVICE — GLOVE ORTHO 8   MSG9480

## (undated) DEVICE — SUTURE FIBERWIRE SZ 2 W/ TAPERED NEEDLE BLUE L38IN NONABSORB BLU L26.5MM 1/2 CIRCLE AR7200

## (undated) DEVICE — PAD,ABDOMINAL,5"X9",ST,LF,25/BX: Brand: MEDLINE INDUSTRIES, INC.

## (undated) DEVICE — 3M™ COBAN™ NL STERILE NON-LATEX SELF-ADHERENT WRAP, 2086S, 6 IN X 5 YD (15 CM X 4,5 M), 12 ROLLS/CASE: Brand: 3M™ COBAN™

## (undated) DEVICE — KIT SHLDR STBL MARCO FOR SPIDER LIMB POS

## (undated) DEVICE — DUAL CUT SAGITTAL BLADE

## (undated) DEVICE — SPLNT ORTHO GLASS 4X15

## (undated) DEVICE — APPLICATOR MEDICATED 26 CC SOLUTION HI LT ORNG CHLORAPREP

## (undated) DEVICE — SUTURE VCRL SZ 4-0 L27IN ABSRB UD L19MM PS-2 3/8 CIR PRIM J426H

## (undated) DEVICE — SUTURE MCRYL SZ 4-0 L27IN ABSRB UD L19MM PS-2 1/2 CIR PRIM Y426H

## (undated) DEVICE — TRAP FNGR LG MESH DBL

## (undated) DEVICE — TOWEL,OR,DSP,ST,BLUE,DLX,8/PK,10PK/CS: Brand: MEDLINE

## (undated) DEVICE — SOLUTION IV 1000ML 0.9% SOD CHL

## (undated) DEVICE — SHEET, ORTHO, SPLIT, STERILE: Brand: MEDLINE

## (undated) DEVICE — GOWN,REINFRCE,POLY,ECLIPSE,SLV,3XLG: Brand: MEDLINE

## (undated) DEVICE — BANDAGE COBAN 4 IN COMPR W4INXL5YD FOAM COHESIVE QUIK STK SELF ADH SFT

## (undated) DEVICE — GOWN,SIRUS,POLYRNF,BRTHSLV,XL,30/CS: Brand: MEDLINE

## (undated) DEVICE — COVER,TABLE,HEAVY DUTY,77"X90",STRL: Brand: MEDLINE

## (undated) DEVICE — 3M™ STERI-DRAPE™ U-DRAPE 1015: Brand: STERI-DRAPE™

## (undated) DEVICE — SOLUTION IRRIG 3000ML 0.9% SOD CHL USP UROMATIC PLAS CONT

## (undated) DEVICE — STAPLER SKIN H3.9MM WIRE DIA0.58MM CRWN 6.9MM 35 STPL FIX

## (undated) DEVICE — VELCLOSE LATEX FREE ELASTIC BANDAGE D/L 6INX10YD

## (undated) DEVICE — PROTECTOR ULN NRV PUR FOAM HK LOOP STRP ANATOMICALLY

## (undated) DEVICE — SLING ARM L L17 1/2IN D8.5IN COT POLY DLX W/ SHLDR PD

## (undated) DEVICE — SHEET,T,THYROID,STERILE: Brand: MEDLINE

## (undated) DEVICE — ELECTRODE PT RET AD L9FT HI MOIST COND ADH HYDRGEL CORDED

## (undated) DEVICE — PADDING UNDERCAST W4INXL4YD 100% COT CRIMPED FINISH WBRL II

## (undated) DEVICE — CLEANER,CAUTERY TIP,2X2",STERILE: Brand: MEDLINE

## (undated) DEVICE — SYSTEM SKIN CLSR 22CM DERMBND PRINEO

## (undated) DEVICE — SUTURE VCRL SZ 2-0 L18IN ABSRB UD CT-1 L36MM 1/2 CIR J839D

## (undated) DEVICE — SUTURE ETHBND EXCEL SZ 2 L30IN NONABSORBABLE GRN L40MM V-37 MX69G

## (undated) DEVICE — SYRINGE IRRIG 60ML SFT PLIABLE BLB EZ TO GRP 1 HND USE W/

## (undated) DEVICE — PAD DRY FLOOR ABS 32X58IN GRN

## (undated) DEVICE — FRAZIER SUCTION INSTRUMENT 12 FR W/CONTROL VENT & OBTURATOR: Brand: FRAZIER

## (undated) DEVICE — SOLUTION IV IRRIG WATER 1000ML POUR BRL 2F7114